# Patient Record
Sex: MALE | Race: WHITE | Employment: FULL TIME | URBAN - NONMETROPOLITAN AREA
[De-identification: names, ages, dates, MRNs, and addresses within clinical notes are randomized per-mention and may not be internally consistent; named-entity substitution may affect disease eponyms.]

---

## 2021-12-27 ENCOUNTER — APPOINTMENT (OUTPATIENT)
Dept: GENERAL RADIOLOGY | Age: 46
DRG: 177 | End: 2021-12-27

## 2021-12-27 ENCOUNTER — APPOINTMENT (OUTPATIENT)
Dept: CT IMAGING | Age: 46
DRG: 177 | End: 2021-12-27

## 2021-12-27 ENCOUNTER — HOSPITAL ENCOUNTER (INPATIENT)
Age: 46
LOS: 7 days | Discharge: HOME | DRG: 177 | End: 2022-01-03
Attending: PEDIATRICS | Admitting: HOSPITALIST

## 2021-12-27 DIAGNOSIS — U07.1 COVID-19 VIRUS INFECTION: ICD-10-CM

## 2021-12-27 DIAGNOSIS — E13.10 DIABETIC KETOACIDOSIS WITHOUT COMA ASSOCIATED WITH OTHER SPECIFIED DIABETES MELLITUS (HCC): Primary | ICD-10-CM

## 2021-12-27 DIAGNOSIS — E10.10 DKA, TYPE 1, NOT AT GOAL (HCC): ICD-10-CM

## 2021-12-27 LAB
ADENOVIRUS BY PCR: NOT DETECTED
ALBUMIN SERPL-MCNC: 3.1 G/DL (ref 3.5–5.2)
ALBUMIN SERPL-MCNC: 3.9 G/DL (ref 3.5–5.2)
ALP BLD-CCNC: 120 U/L (ref 40–130)
ALP BLD-CCNC: 131 U/L (ref 40–130)
ALT SERPL-CCNC: 33 U/L (ref 5–41)
ALT SERPL-CCNC: 36 U/L (ref 5–41)
AMORPHOUS: ABNORMAL /HPF
AMPHETAMINE SCREEN, URINE: NEGATIVE
AMYLASE: 82 U/L (ref 28–100)
ANION GAP SERPL CALCULATED.3IONS-SCNC: 19 MMOL/L (ref 7–19)
ANION GAP SERPL CALCULATED.3IONS-SCNC: 22 MMOL/L (ref 7–19)
ANION GAP SERPL CALCULATED.3IONS-SCNC: 25 MMOL/L (ref 7–19)
ANION GAP SERPL CALCULATED.3IONS-SCNC: 28 MMOL/L (ref 7–19)
AST SERPL-CCNC: 22 U/L (ref 5–40)
AST SERPL-CCNC: 32 U/L (ref 5–40)
BACTERIA: ABNORMAL /HPF
BARBITURATE SCREEN URINE: NEGATIVE
BASE EXCESS ARTERIAL: -19.1 MMOL/L (ref -2–2)
BASE EXCESS ARTERIAL: -22.5 MMOL/L (ref -2–2)
BASE EXCESS ARTERIAL: -23.4 MMOL/L (ref -2–2)
BASE EXCESS ARTERIAL: -26.7 MMOL/L (ref -2–2)
BASE EXCESS ARTERIAL: -27.1 MMOL/L (ref -2–2)
BASOPHILS ABSOLUTE: 0.1 K/UL (ref 0–0.2)
BASOPHILS RELATIVE PERCENT: 0.4 % (ref 0–1)
BENZODIAZEPINE SCREEN, URINE: NEGATIVE
BILIRUB SERPL-MCNC: <0.2 MG/DL (ref 0.2–1.2)
BILIRUB SERPL-MCNC: <0.2 MG/DL (ref 0.2–1.2)
BILIRUBIN DIRECT: 0.1 MG/DL (ref 0–0.3)
BILIRUBIN URINE: NEGATIVE
BILIRUBIN, INDIRECT: 0.1 MG/DL (ref 0.1–1)
BLOOD, URINE: ABNORMAL
BORDETELLA PARAPERTUSSIS BY PCR: NOT DETECTED
BORDETELLA PERTUSSIS BY PCR: NOT DETECTED
BUN BLDV-MCNC: 14 MG/DL (ref 6–20)
BUN BLDV-MCNC: 15 MG/DL (ref 6–20)
BUN BLDV-MCNC: 16 MG/DL (ref 6–20)
BUN BLDV-MCNC: 18 MG/DL (ref 6–20)
C-REACTIVE PROTEIN: 15.78 MG/DL (ref 0–0.5)
CALCIUM SERPL-MCNC: 7.3 MG/DL (ref 8.6–10)
CALCIUM SERPL-MCNC: 7.7 MG/DL (ref 8.6–10)
CALCIUM SERPL-MCNC: 8.1 MG/DL (ref 8.6–10)
CALCIUM SERPL-MCNC: 8.9 MG/DL (ref 8.6–10)
CANNABINOID SCREEN URINE: NEGATIVE
CARBOXYHEMOGLOBIN ARTERIAL: 1.5 % (ref 0–5)
CARBOXYHEMOGLOBIN ARTERIAL: 1.5 % (ref 0–5)
CARBOXYHEMOGLOBIN ARTERIAL: 1.7 % (ref 0–5)
CARBOXYHEMOGLOBIN ARTERIAL: 1.8 % (ref 0–5)
CARBOXYHEMOGLOBIN ARTERIAL: 1.9 % (ref 0–5)
CHLAMYDOPHILIA PNEUMONIAE BY PCR: NOT DETECTED
CHLORIDE BLD-SCNC: 104 MMOL/L (ref 98–111)
CHLORIDE BLD-SCNC: 107 MMOL/L (ref 98–111)
CHLORIDE BLD-SCNC: 108 MMOL/L (ref 98–111)
CHLORIDE BLD-SCNC: 93 MMOL/L (ref 98–111)
CHP ED QC CHECK: YES
CLARITY: CLEAR
CO2: 6 MMOL/L (ref 22–29)
CO2: 7 MMOL/L (ref 22–29)
CO2: 7 MMOL/L (ref 22–29)
CO2: 8 MMOL/L (ref 22–29)
COARSE CASTS, UA: ABNORMAL /LPF (ref 0–5)
COCAINE METABOLITE SCREEN URINE: NEGATIVE
COLOR: YELLOW
CORONAVIRUS 229E BY PCR: NOT DETECTED
CORONAVIRUS HKU1 BY PCR: NOT DETECTED
CORONAVIRUS NL63 BY PCR: NOT DETECTED
CORONAVIRUS OC43 BY PCR: NOT DETECTED
CREAT SERPL-MCNC: 1 MG/DL (ref 0.5–1.2)
CREAT SERPL-MCNC: 1.2 MG/DL (ref 0.5–1.2)
CREAT SERPL-MCNC: 1.2 MG/DL (ref 0.5–1.2)
CREAT SERPL-MCNC: 1.4 MG/DL (ref 0.5–1.2)
CRYSTALS, UA: ABNORMAL /HPF
EKG P AXIS: 69 DEGREES
EKG P-R INTERVAL: 162 MS
EKG Q-T INTERVAL: 310 MS
EKG QRS DURATION: 84 MS
EKG QTC CALCULATION (BAZETT): 436 MS
EKG T AXIS: 52 DEGREES
EOSINOPHILS ABSOLUTE: 0 K/UL (ref 0–0.6)
EOSINOPHILS RELATIVE PERCENT: 0.2 % (ref 0–5)
EPITHELIAL CELLS, UA: ABNORMAL /HPF
ETHANOL: <10 MG/DL (ref 0–0.08)
GFR AFRICAN AMERICAN: >59
GFR NON-AFRICAN AMERICAN: 55
GFR NON-AFRICAN AMERICAN: >60
GLUCOSE BLD-MCNC: 234 MG/DL (ref 70–99)
GLUCOSE BLD-MCNC: 258 MG/DL (ref 70–99)
GLUCOSE BLD-MCNC: 261 MG/DL (ref 70–99)
GLUCOSE BLD-MCNC: 267 MG/DL (ref 70–99)
GLUCOSE BLD-MCNC: 269 MG/DL (ref 70–99)
GLUCOSE BLD-MCNC: 270 MG/DL (ref 70–99)
GLUCOSE BLD-MCNC: 284 MG/DL
GLUCOSE BLD-MCNC: 284 MG/DL (ref 70–99)
GLUCOSE BLD-MCNC: 289 MG/DL
GLUCOSE BLD-MCNC: 289 MG/DL (ref 70–99)
GLUCOSE BLD-MCNC: 291 MG/DL
GLUCOSE BLD-MCNC: 291 MG/DL (ref 70–99)
GLUCOSE BLD-MCNC: 297 MG/DL
GLUCOSE BLD-MCNC: 297 MG/DL (ref 70–99)
GLUCOSE BLD-MCNC: 307 MG/DL (ref 74–109)
GLUCOSE BLD-MCNC: 308 MG/DL (ref 70–99)
GLUCOSE BLD-MCNC: 313 MG/DL (ref 70–99)
GLUCOSE BLD-MCNC: 325 MG/DL
GLUCOSE BLD-MCNC: 325 MG/DL (ref 70–99)
GLUCOSE BLD-MCNC: 335 MG/DL (ref 70–99)
GLUCOSE BLD-MCNC: 342 MG/DL (ref 74–109)
GLUCOSE BLD-MCNC: 349 MG/DL
GLUCOSE BLD-MCNC: 349 MG/DL (ref 70–99)
GLUCOSE BLD-MCNC: 350 MG/DL (ref 74–109)
GLUCOSE BLD-MCNC: 372 MG/DL (ref 74–109)
GLUCOSE URINE: =>1000 MG/DL
HBA1C MFR BLD: 12.3 % (ref 4–6)
HCO3 ARTERIAL: 3 MMOL/L (ref 22–26)
HCO3 ARTERIAL: 3.5 MMOL/L (ref 22–26)
HCO3 ARTERIAL: 3.9 MMOL/L (ref 22–26)
HCO3 ARTERIAL: 5.6 MMOL/L (ref 22–26)
HCO3 ARTERIAL: 7.5 MMOL/L (ref 22–26)
HCT VFR BLD CALC: 54.8 % (ref 42–52)
HEMOGLOBIN, ART, EXTENDED: 16.2 G/DL (ref 14–18)
HEMOGLOBIN, ART, EXTENDED: 16.4 G/DL (ref 14–18)
HEMOGLOBIN, ART, EXTENDED: 16.8 G/DL (ref 14–18)
HEMOGLOBIN, ART, EXTENDED: 17.4 G/DL (ref 14–18)
HEMOGLOBIN, ART, EXTENDED: 18 G/DL (ref 14–18)
HEMOGLOBIN: 18.2 G/DL (ref 14–18)
HUMAN METAPNEUMOVIRUS BY PCR: NOT DETECTED
HUMAN RHINOVIRUS/ENTEROVIRUS BY PCR: NOT DETECTED
IMMATURE GRANULOCYTES #: 0.2 K/UL
INFLUENZA A BY PCR: NOT DETECTED
INFLUENZA B BY PCR: NOT DETECTED
KETONES, URINE: =>160 MG/DL
LACTIC ACID: 1.3 MMOL/L (ref 0.5–1.9)
LEUKOCYTE ESTERASE, URINE: NEGATIVE
LIPASE: 12 U/L (ref 13–60)
LIPASE: 25 U/L (ref 13–60)
LYMPHOCYTES ABSOLUTE: 1.2 K/UL (ref 1.1–4.5)
LYMPHOCYTES RELATIVE PERCENT: 7.6 % (ref 20–40)
Lab: NORMAL
MAGNESIUM: 1.7 MG/DL (ref 1.6–2.6)
MAGNESIUM: 1.9 MG/DL (ref 1.6–2.6)
MAGNESIUM: 2.1 MG/DL (ref 1.6–2.6)
MCH RBC QN AUTO: 29.8 PG (ref 27–31)
MCHC RBC AUTO-ENTMCNC: 33.2 G/DL (ref 33–37)
MCV RBC AUTO: 89.8 FL (ref 80–94)
METHEMOGLOBIN ARTERIAL: 1 %
METHEMOGLOBIN ARTERIAL: 1 %
METHEMOGLOBIN ARTERIAL: 1.2 %
METHEMOGLOBIN ARTERIAL: 1.5 %
METHEMOGLOBIN ARTERIAL: 1.5 %
MONOCYTES ABSOLUTE: 0.9 K/UL (ref 0–0.9)
MONOCYTES RELATIVE PERCENT: 5.6 % (ref 0–10)
MUCUS: ABNORMAL /LPF
MYCOPLASMA PNEUMONIAE BY PCR: NOT DETECTED
NEUTROPHILS ABSOLUTE: 13.7 K/UL (ref 1.5–7.5)
NEUTROPHILS RELATIVE PERCENT: 85.3 % (ref 50–65)
NITRITE, URINE: NEGATIVE
O2 CONTENT ARTERIAL: 21.3 ML/DL
O2 CONTENT ARTERIAL: 21.5 ML/DL
O2 CONTENT ARTERIAL: 22.2 ML/DL
O2 CONTENT ARTERIAL: 22.9 ML/DL
O2 CONTENT ARTERIAL: 23.8 ML/DL
O2 SAT, ARTERIAL: 92.5 %
O2 SAT, ARTERIAL: 93.7 %
O2 SAT, ARTERIAL: 94.1 %
O2 SAT, ARTERIAL: 94.1 %
O2 SAT, ARTERIAL: 94.3 %
O2 THERAPY: ABNORMAL
OPIATE SCREEN URINE: NEGATIVE
PARAINFLUENZA VIRUS 1 BY PCR: NOT DETECTED
PARAINFLUENZA VIRUS 2 BY PCR: NOT DETECTED
PARAINFLUENZA VIRUS 3 BY PCR: NOT DETECTED
PARAINFLUENZA VIRUS 4 BY PCR: NOT DETECTED
PCO2 ARTERIAL: 13 MMHG (ref 35–45)
PCO2 ARTERIAL: 13 MMHG (ref 35–45)
PCO2 ARTERIAL: 15 MMHG (ref 35–45)
PCO2 ARTERIAL: 19 MMHG (ref 35–45)
PCO2 ARTERIAL: 21 MMHG (ref 35–45)
PDW BLD-RTO: 12.8 % (ref 11.5–14.5)
PERFORMED ON: ABNORMAL
PH ARTERIAL: 6.97 (ref 7.35–7.45)
PH ARTERIAL: 6.97 (ref 7.35–7.45)
PH ARTERIAL: 7.08 (ref 7.35–7.45)
PH ARTERIAL: 7.09 (ref 7.35–7.45)
PH ARTERIAL: 7.16 (ref 7.35–7.45)
PH UA: 5 (ref 5–8)
PHOSPHORUS: 0.8 MG/DL (ref 2.5–4.5)
PHOSPHORUS: 1.9 MG/DL (ref 2.5–4.5)
PLATELET # BLD: 188 K/UL (ref 130–400)
PMV BLD AUTO: 10.7 FL (ref 9.4–12.4)
PO2 ARTERIAL: 56 MMHG (ref 80–100)
PO2 ARTERIAL: 64 MMHG (ref 80–100)
PO2 ARTERIAL: 66 MMHG (ref 80–100)
PO2 ARTERIAL: 78 MMHG (ref 80–100)
PO2 ARTERIAL: 80 MMHG (ref 80–100)
POTASSIUM SERPL-SCNC: 3.7 MMOL/L (ref 3.5–5)
POTASSIUM SERPL-SCNC: 3.7 MMOL/L (ref 3.5–5)
POTASSIUM SERPL-SCNC: 4 MMOL/L (ref 3.5–5)
POTASSIUM SERPL-SCNC: 4.2 MMOL/L (ref 3.5–5)
POTASSIUM, WHOLE BLOOD: 2.5
POTASSIUM, WHOLE BLOOD: 3.3
POTASSIUM, WHOLE BLOOD: 3.7
POTASSIUM, WHOLE BLOOD: 3.8
POTASSIUM, WHOLE BLOOD: 4
PROTEIN UA: 100 MG/DL
RBC # BLD: 6.1 M/UL (ref 4.7–6.1)
RESPIRATORY SYNCYTIAL VIRUS BY PCR: NOT DETECTED
SARS-COV-2, PCR: DETECTED
SEDIMENTATION RATE, ERYTHROCYTE: 47 MM/HR (ref 0–10)
SODIUM BLD-SCNC: 128 MMOL/L (ref 136–145)
SODIUM BLD-SCNC: 135 MMOL/L (ref 136–145)
SODIUM BLD-SCNC: 135 MMOL/L (ref 136–145)
SODIUM BLD-SCNC: 136 MMOL/L (ref 136–145)
SPECIFIC GRAVITY UA: 1.03 (ref 1–1.03)
TOTAL CK: 74 U/L (ref 39–308)
TOTAL PROTEIN: 6.6 G/DL (ref 6.6–8.7)
TOTAL PROTEIN: 7.9 G/DL (ref 6.6–8.7)
TROPONIN: <0.01 NG/ML (ref 0–0.03)
TSH SERPL DL<=0.05 MIU/L-ACNC: 0.58 UIU/ML (ref 0.27–4.2)
UROBILINOGEN, URINE: 0.2 E.U./DL
WBC # BLD: 16.1 K/UL (ref 4.8–10.8)

## 2021-12-27 PROCEDURE — 82077 ASSAY SPEC XCP UR&BREATH IA: CPT

## 2021-12-27 PROCEDURE — 83690 ASSAY OF LIPASE: CPT

## 2021-12-27 PROCEDURE — 2500000003 HC RX 250 WO HCPCS: Performed by: HOSPITALIST

## 2021-12-27 PROCEDURE — 6360000004 HC RX CONTRAST MEDICATION: Performed by: PEDIATRICS

## 2021-12-27 PROCEDURE — 2500000003 HC RX 250 WO HCPCS: Performed by: INTERNAL MEDICINE

## 2021-12-27 PROCEDURE — 81001 URINALYSIS AUTO W/SCOPE: CPT

## 2021-12-27 PROCEDURE — 83605 ASSAY OF LACTIC ACID: CPT

## 2021-12-27 PROCEDURE — 82803 BLOOD GASES ANY COMBINATION: CPT

## 2021-12-27 PROCEDURE — 6360000002 HC RX W HCPCS: Performed by: HOSPITALIST

## 2021-12-27 PROCEDURE — 96365 THER/PROPH/DIAG IV INF INIT: CPT

## 2021-12-27 PROCEDURE — 85652 RBC SED RATE AUTOMATED: CPT

## 2021-12-27 PROCEDURE — 82947 ASSAY GLUCOSE BLOOD QUANT: CPT

## 2021-12-27 PROCEDURE — 87086 URINE CULTURE/COLONY COUNT: CPT

## 2021-12-27 PROCEDURE — 6360000002 HC RX W HCPCS

## 2021-12-27 PROCEDURE — 87040 BLOOD CULTURE FOR BACTERIA: CPT

## 2021-12-27 PROCEDURE — 51702 INSERT TEMP BLADDER CATH: CPT

## 2021-12-27 PROCEDURE — 2580000003 HC RX 258: Performed by: INTERNAL MEDICINE

## 2021-12-27 PROCEDURE — 0202U NFCT DS 22 TRGT SARS-COV-2: CPT

## 2021-12-27 PROCEDURE — 6370000000 HC RX 637 (ALT 250 FOR IP): Performed by: HOSPITALIST

## 2021-12-27 PROCEDURE — 93010 ELECTROCARDIOGRAM REPORT: CPT | Performed by: INTERNAL MEDICINE

## 2021-12-27 PROCEDURE — 85025 COMPLETE CBC W/AUTO DIFF WBC: CPT

## 2021-12-27 PROCEDURE — 2700000000 HC OXYGEN THERAPY PER DAY

## 2021-12-27 PROCEDURE — 71275 CT ANGIOGRAPHY CHEST: CPT

## 2021-12-27 PROCEDURE — 83735 ASSAY OF MAGNESIUM: CPT

## 2021-12-27 PROCEDURE — 84443 ASSAY THYROID STIM HORMONE: CPT

## 2021-12-27 PROCEDURE — 2580000003 HC RX 258: Performed by: HOSPITALIST

## 2021-12-27 PROCEDURE — 2500000003 HC RX 250 WO HCPCS

## 2021-12-27 PROCEDURE — 96375 TX/PRO/DX INJ NEW DRUG ADDON: CPT

## 2021-12-27 PROCEDURE — 83036 HEMOGLOBIN GLYCOSYLATED A1C: CPT

## 2021-12-27 PROCEDURE — 36415 COLL VENOUS BLD VENIPUNCTURE: CPT

## 2021-12-27 PROCEDURE — 82150 ASSAY OF AMYLASE: CPT

## 2021-12-27 PROCEDURE — 82010 KETONE BODYS QUAN: CPT

## 2021-12-27 PROCEDURE — 36600 WITHDRAWAL OF ARTERIAL BLOOD: CPT

## 2021-12-27 PROCEDURE — 80053 COMPREHEN METABOLIC PANEL: CPT

## 2021-12-27 PROCEDURE — 3E0333Z INTRODUCTION OF ANTI-INFLAMMATORY INTO PERIPHERAL VEIN, PERCUTANEOUS APPROACH: ICD-10-PCS | Performed by: HOSPITALIST

## 2021-12-27 PROCEDURE — 2580000003 HC RX 258: Performed by: PEDIATRICS

## 2021-12-27 PROCEDURE — 72100 X-RAY EXAM L-S SPINE 2/3 VWS: CPT

## 2021-12-27 PROCEDURE — 96361 HYDRATE IV INFUSION ADD-ON: CPT

## 2021-12-27 PROCEDURE — 84100 ASSAY OF PHOSPHORUS: CPT

## 2021-12-27 PROCEDURE — 84132 ASSAY OF SERUM POTASSIUM: CPT

## 2021-12-27 PROCEDURE — 80307 DRUG TEST PRSMV CHEM ANLYZR: CPT

## 2021-12-27 PROCEDURE — 84484 ASSAY OF TROPONIN QUANT: CPT

## 2021-12-27 PROCEDURE — 6370000000 HC RX 637 (ALT 250 FOR IP): Performed by: PEDIATRICS

## 2021-12-27 PROCEDURE — 2100000000 HC CCU R&B

## 2021-12-27 PROCEDURE — 82248 BILIRUBIN DIRECT: CPT

## 2021-12-27 PROCEDURE — 6360000002 HC RX W HCPCS: Performed by: INTERNAL MEDICINE

## 2021-12-27 PROCEDURE — 86140 C-REACTIVE PROTEIN: CPT

## 2021-12-27 PROCEDURE — 93005 ELECTROCARDIOGRAM TRACING: CPT | Performed by: PEDIATRICS

## 2021-12-27 PROCEDURE — 99284 EMERGENCY DEPT VISIT MOD MDM: CPT

## 2021-12-27 PROCEDURE — 82550 ASSAY OF CK (CPK): CPT

## 2021-12-27 RX ORDER — MAGNESIUM SULFATE 1 G/100ML
1000 INJECTION INTRAVENOUS PRN
Status: DISCONTINUED | OUTPATIENT
Start: 2021-12-27 | End: 2021-12-29 | Stop reason: SDUPTHER

## 2021-12-27 RX ORDER — LORAZEPAM 2 MG/ML
1 INJECTION INTRAMUSCULAR EVERY 4 HOURS PRN
Status: DISCONTINUED | OUTPATIENT
Start: 2021-12-27 | End: 2021-12-27

## 2021-12-27 RX ORDER — 0.9 % SODIUM CHLORIDE 0.9 %
1000 INTRAVENOUS SOLUTION INTRAVENOUS ONCE
Status: COMPLETED | OUTPATIENT
Start: 2021-12-27 | End: 2021-12-27

## 2021-12-27 RX ORDER — HYDROMORPHONE HYDROCHLORIDE 1 MG/ML
0.5 INJECTION, SOLUTION INTRAMUSCULAR; INTRAVENOUS; SUBCUTANEOUS EVERY 30 MIN PRN
Status: DISCONTINUED | OUTPATIENT
Start: 2021-12-27 | End: 2021-12-27

## 2021-12-27 RX ORDER — SODIUM CHLORIDE 0.9 % (FLUSH) 0.9 %
5-40 SYRINGE (ML) INJECTION PRN
Status: DISCONTINUED | OUTPATIENT
Start: 2021-12-27 | End: 2021-12-28 | Stop reason: SDUPTHER

## 2021-12-27 RX ORDER — POLYETHYLENE GLYCOL 3350 17 G/17G
17 POWDER, FOR SOLUTION ORAL DAILY PRN
Status: DISCONTINUED | OUTPATIENT
Start: 2021-12-27 | End: 2021-12-27

## 2021-12-27 RX ORDER — POTASSIUM CHLORIDE 7.45 MG/ML
10 INJECTION INTRAVENOUS PRN
Status: DISCONTINUED | OUTPATIENT
Start: 2021-12-27 | End: 2021-12-29 | Stop reason: SDUPTHER

## 2021-12-27 RX ORDER — ACETAMINOPHEN 650 MG/1
650 SUPPOSITORY RECTAL EVERY 6 HOURS PRN
Status: DISCONTINUED | OUTPATIENT
Start: 2021-12-27 | End: 2021-12-28 | Stop reason: SDUPTHER

## 2021-12-27 RX ORDER — SODIUM CHLORIDE 9 MG/ML
INJECTION, SOLUTION INTRAVENOUS CONTINUOUS
Status: DISCONTINUED | OUTPATIENT
Start: 2021-12-27 | End: 2021-12-29

## 2021-12-27 RX ORDER — SODIUM CHLORIDE 0.9 % (FLUSH) 0.9 %
5-40 SYRINGE (ML) INJECTION EVERY 12 HOURS SCHEDULED
Status: DISCONTINUED | OUTPATIENT
Start: 2021-12-28 | End: 2021-12-28 | Stop reason: SDUPTHER

## 2021-12-27 RX ORDER — SODIUM CHLORIDE 0.9 % (FLUSH) 0.9 %
5-40 SYRINGE (ML) INJECTION EVERY 12 HOURS SCHEDULED
Status: DISCONTINUED | OUTPATIENT
Start: 2021-12-27 | End: 2022-01-01 | Stop reason: SDUPTHER

## 2021-12-27 RX ORDER — ACETAMINOPHEN 650 MG/1
650 SUPPOSITORY RECTAL EVERY 6 HOURS PRN
Status: DISCONTINUED | OUTPATIENT
Start: 2021-12-27 | End: 2022-01-03 | Stop reason: HOSPADM

## 2021-12-27 RX ORDER — SODIUM CHLORIDE 9 MG/ML
25 INJECTION, SOLUTION INTRAVENOUS PRN
Status: DISCONTINUED | OUTPATIENT
Start: 2021-12-27 | End: 2021-12-28 | Stop reason: SDUPTHER

## 2021-12-27 RX ORDER — DEXTROSE, SODIUM CHLORIDE, AND POTASSIUM CHLORIDE 5; .45; .15 G/100ML; G/100ML; G/100ML
INJECTION INTRAVENOUS CONTINUOUS PRN
Status: DISCONTINUED | OUTPATIENT
Start: 2021-12-27 | End: 2021-12-29 | Stop reason: SDUPTHER

## 2021-12-27 RX ORDER — DEXAMETHASONE SODIUM PHOSPHATE 10 MG/ML
4 INJECTION, SOLUTION INTRAMUSCULAR; INTRAVENOUS EVERY 6 HOURS
Status: DISCONTINUED | OUTPATIENT
Start: 2021-12-27 | End: 2021-12-28

## 2021-12-27 RX ORDER — ACETAMINOPHEN 325 MG/1
650 TABLET ORAL EVERY 6 HOURS PRN
Status: DISCONTINUED | OUTPATIENT
Start: 2021-12-27 | End: 2021-12-30 | Stop reason: SDUPTHER

## 2021-12-27 RX ORDER — LORAZEPAM 2 MG/ML
0.5 INJECTION INTRAMUSCULAR EVERY 4 HOURS PRN
Status: DISCONTINUED | OUTPATIENT
Start: 2021-12-27 | End: 2021-12-29

## 2021-12-27 RX ORDER — ACETAMINOPHEN 325 MG/1
650 TABLET ORAL EVERY 6 HOURS PRN
Status: DISCONTINUED | OUTPATIENT
Start: 2021-12-27 | End: 2021-12-28 | Stop reason: SDUPTHER

## 2021-12-27 RX ORDER — ONDANSETRON 2 MG/ML
4 INJECTION INTRAMUSCULAR; INTRAVENOUS EVERY 6 HOURS PRN
Status: DISCONTINUED | OUTPATIENT
Start: 2021-12-27 | End: 2022-01-03 | Stop reason: HOSPADM

## 2021-12-27 RX ORDER — METOPROLOL TARTRATE 5 MG/5ML
2.5 INJECTION INTRAVENOUS EVERY 5 MIN PRN
Status: COMPLETED | OUTPATIENT
Start: 2021-12-27 | End: 2021-12-28

## 2021-12-27 RX ORDER — DEXTROSE MONOHYDRATE 25 G/50ML
12.5 INJECTION, SOLUTION INTRAVENOUS PRN
Status: DISCONTINUED | OUTPATIENT
Start: 2021-12-27 | End: 2021-12-28 | Stop reason: SDUPTHER

## 2021-12-27 RX ORDER — ONDANSETRON 2 MG/ML
INJECTION INTRAMUSCULAR; INTRAVENOUS
Status: COMPLETED
Start: 2021-12-27 | End: 2021-12-27

## 2021-12-27 RX ORDER — POTASSIUM CHLORIDE 7.45 MG/ML
10 INJECTION INTRAVENOUS
Status: ACTIVE | OUTPATIENT
Start: 2021-12-27 | End: 2021-12-28

## 2021-12-27 RX ORDER — 0.9 % SODIUM CHLORIDE 0.9 %
15 INTRAVENOUS SOLUTION INTRAVENOUS ONCE
Status: COMPLETED | OUTPATIENT
Start: 2021-12-27 | End: 2021-12-27

## 2021-12-27 RX ORDER — ONDANSETRON 4 MG/1
4 TABLET, ORALLY DISINTEGRATING ORAL EVERY 8 HOURS PRN
Status: DISCONTINUED | OUTPATIENT
Start: 2021-12-27 | End: 2022-01-03 | Stop reason: HOSPADM

## 2021-12-27 RX ORDER — POLYETHYLENE GLYCOL 3350 17 G/17G
17 POWDER, FOR SOLUTION ORAL DAILY PRN
Status: DISCONTINUED | OUTPATIENT
Start: 2021-12-27 | End: 2022-01-03 | Stop reason: HOSPADM

## 2021-12-27 RX ORDER — ONDANSETRON 2 MG/ML
4 INJECTION INTRAMUSCULAR; INTRAVENOUS ONCE
Status: COMPLETED | OUTPATIENT
Start: 2021-12-27 | End: 2021-12-27

## 2021-12-27 RX ORDER — SODIUM CHLORIDE 0.9 % (FLUSH) 0.9 %
5-40 SYRINGE (ML) INJECTION PRN
Status: DISCONTINUED | OUTPATIENT
Start: 2021-12-27 | End: 2022-01-01 | Stop reason: SDUPTHER

## 2021-12-27 RX ORDER — SODIUM CHLORIDE 9 MG/ML
25 INJECTION, SOLUTION INTRAVENOUS PRN
Status: DISCONTINUED | OUTPATIENT
Start: 2021-12-27 | End: 2021-12-29 | Stop reason: SDUPTHER

## 2021-12-27 RX ORDER — ONDANSETRON 2 MG/ML
4 INJECTION INTRAMUSCULAR; INTRAVENOUS EVERY 6 HOURS PRN
Status: DISCONTINUED | OUTPATIENT
Start: 2021-12-27 | End: 2021-12-27

## 2021-12-27 RX ORDER — LORAZEPAM 2 MG/ML
INJECTION INTRAMUSCULAR
Status: COMPLETED
Start: 2021-12-27 | End: 2021-12-27

## 2021-12-27 RX ORDER — LORAZEPAM 2 MG/ML
2 INJECTION INTRAMUSCULAR ONCE
Status: COMPLETED | OUTPATIENT
Start: 2021-12-27 | End: 2021-12-27

## 2021-12-27 RX ORDER — ONDANSETRON 4 MG/1
4 TABLET, ORALLY DISINTEGRATING ORAL EVERY 8 HOURS PRN
Status: DISCONTINUED | OUTPATIENT
Start: 2021-12-27 | End: 2021-12-27

## 2021-12-27 RX ADMIN — SODIUM CHLORIDE 0.5 UNITS/HR: 9 INJECTION, SOLUTION INTRAVENOUS at 04:08

## 2021-12-27 RX ADMIN — SODIUM BICARBONATE 50 MEQ: 84 INJECTION, SOLUTION INTRAVENOUS at 06:36

## 2021-12-27 RX ADMIN — DEXAMETHASONE SODIUM PHOSPHATE 4 MG: 10 INJECTION INTRAMUSCULAR; INTRAVENOUS at 12:08

## 2021-12-27 RX ADMIN — SODIUM CHLORIDE: 9 INJECTION, SOLUTION INTRAVENOUS at 16:35

## 2021-12-27 RX ADMIN — SODIUM CHLORIDE 0.75 UNITS/HR: 9 INJECTION, SOLUTION INTRAVENOUS at 08:36

## 2021-12-27 RX ADMIN — ENOXAPARIN SODIUM 30 MG: 100 INJECTION SUBCUTANEOUS at 12:08

## 2021-12-27 RX ADMIN — IOPAMIDOL 90 ML: 755 INJECTION, SOLUTION INTRAVENOUS at 03:46

## 2021-12-27 RX ADMIN — SODIUM CHLORIDE: 9 INJECTION, SOLUTION INTRAVENOUS at 20:21

## 2021-12-27 RX ADMIN — DEXTROSE, SODIUM CHLORIDE, AND POTASSIUM CHLORIDE: 5; .45; .15 INJECTION INTRAVENOUS at 18:32

## 2021-12-27 RX ADMIN — DEXAMETHASONE SODIUM PHOSPHATE 4 MG: 10 INJECTION INTRAMUSCULAR; INTRAVENOUS at 18:36

## 2021-12-27 RX ADMIN — POTASSIUM CHLORIDE 10 MEQ: 10 INJECTION, SOLUTION INTRAVENOUS at 21:32

## 2021-12-27 RX ADMIN — LORAZEPAM 2 MG: 2 INJECTION INTRAMUSCULAR at 08:45

## 2021-12-27 RX ADMIN — SODIUM CHLORIDE 7.7 UNITS/HR: 9 INJECTION, SOLUTION INTRAVENOUS at 18:05

## 2021-12-27 RX ADMIN — SODIUM PHOSPHATE, MONOBASIC, MONOHYDRATE 20 MMOL: 276; 142 INJECTION, SOLUTION INTRAVENOUS at 20:20

## 2021-12-27 RX ADMIN — ONDANSETRON 4 MG: 2 INJECTION INTRAMUSCULAR; INTRAVENOUS at 01:56

## 2021-12-27 RX ADMIN — METOPROLOL TARTRATE 2.5 MG: 5 INJECTION INTRAVENOUS at 21:36

## 2021-12-27 RX ADMIN — AZITHROMYCIN MONOHYDRATE 250 MG: 500 INJECTION, POWDER, LYOPHILIZED, FOR SOLUTION INTRAVENOUS at 16:18

## 2021-12-27 RX ADMIN — SODIUM CHLORIDE 1157 ML: 9 INJECTION, SOLUTION INTRAVENOUS at 06:39

## 2021-12-27 RX ADMIN — SODIUM CHLORIDE 1000 ML: 9 INJECTION, SOLUTION INTRAVENOUS at 03:26

## 2021-12-27 RX ADMIN — LORAZEPAM 2 MG: 2 INJECTION, SOLUTION INTRAMUSCULAR; INTRAVENOUS at 08:45

## 2021-12-27 RX ADMIN — CASIRIVIMAB AND IMDEVIMAB: 600; 600 INJECTION, SOLUTION, CONCENTRATE INTRAVENOUS at 09:20

## 2021-12-27 RX ADMIN — POTASSIUM CHLORIDE 10 MEQ: 10 INJECTION, SOLUTION INTRAVENOUS at 20:26

## 2021-12-27 RX ADMIN — Medication: at 19:27

## 2021-12-27 RX ADMIN — Medication 50 MEQ: at 09:01

## 2021-12-27 RX ADMIN — DEXAMETHASONE SODIUM PHOSPHATE 4 MG: 10 INJECTION INTRAMUSCULAR; INTRAVENOUS at 06:36

## 2021-12-27 RX ADMIN — POTASSIUM CHLORIDE 10 MEQ: 10 INJECTION, SOLUTION INTRAVENOUS at 22:37

## 2021-12-27 RX ADMIN — SODIUM CHLORIDE 1000 ML: 9 INJECTION, SOLUTION INTRAVENOUS at 01:58

## 2021-12-27 RX ADMIN — Medication 10 ML: at 21:37

## 2021-12-27 RX ADMIN — SODIUM BICARBONATE 50 MEQ: 84 INJECTION, SOLUTION INTRAVENOUS at 19:07

## 2021-12-27 RX ADMIN — SODIUM CHLORIDE: 9 INJECTION, SOLUTION INTRAVENOUS at 08:58

## 2021-12-27 RX ADMIN — SODIUM CHLORIDE 1000 ML: 9 INJECTION, SOLUTION INTRAVENOUS at 18:55

## 2021-12-27 RX ADMIN — SODIUM CHLORIDE: 9 INJECTION, SOLUTION INTRAVENOUS at 18:06

## 2021-12-27 RX ADMIN — ENOXAPARIN SODIUM 30 MG: 100 INJECTION SUBCUTANEOUS at 21:34

## 2021-12-27 ASSESSMENT — PAIN DESCRIPTION - PAIN TYPE: TYPE: ACUTE PAIN

## 2021-12-27 ASSESSMENT — PAIN SCALES - GENERAL
PAINLEVEL_OUTOF10: 10
PAINLEVEL_OUTOF10: 0

## 2021-12-27 ASSESSMENT — PAIN DESCRIPTION - LOCATION: LOCATION: BACK

## 2021-12-27 ASSESSMENT — PAIN DESCRIPTION - ORIENTATION: ORIENTATION: LOWER;MID

## 2021-12-27 NOTE — PROGRESS NOTES
Results for Everton Calloway (MRN [id number]) as of 12/27/2021 07:05   Ref.  Range 12/27/2021 07:04   Hemoglobin, Art, Extended Latest Ref Range: 14.0 - 18.0 g/dL 17.4   pH, Arterial Latest Ref Range: 7.350 - 7.450  7.090 (LL)   pCO2, Arterial Latest Ref Range: 35.0 - 45.0 mmHg 13.0 (LL)   pO2, Arterial Latest Ref Range: 80.0 - 100.0 mmHg 66.0 (L)   HCO3, Arterial Latest Ref Range: 22.0 - 26.0 mmol/L 3.9 (L)   Base Excess, Arterial Latest Ref Range: -2.0 - 2.0 mmol/L -23.4 (L)   O2 Sat, Arterial Latest Ref Range: >92 % 93.7   O2 Content, Arterial Latest Ref Range: Not Established mL/dL 22.9   Methemoglobin, Arterial Latest Ref Range: <1.5 % 1.0   Carboxyhgb, Arterial Latest Ref Range: 0.0 - 5.0 % 1.5   Pt on room air, rr, AT+

## 2021-12-27 NOTE — ED NOTES
MD Lion Carbone notified of C02 of 7, per him, will treat once the patient arrives up stairs in the CCU     Susan Kim RN  12/27/21 1545

## 2021-12-27 NOTE — ED NOTES
Pt agitated and reports 'having a panic attack.' vo ativan 2 mg iv per johnathan.      Silvia Dawson RN  12/27/21 7755

## 2021-12-27 NOTE — Clinical Note
Patient Class: Inpatient [101]   REQUIRED: Diagnosis: DKA, type 1, not at goal [facility]) [381941]   Estimated Length of Stay: Estimated stay of more than 2 midnights   Admitting Provider: Preeti Bedoya [[id number]]

## 2021-12-27 NOTE — ED NOTES
RN in room to check on patient. IV insulin is running, however does not appear to be attached to patient. Patient's recent BGL had increased, RN does not adjust rate of insulin drip, due to the fact that the insulin has not been reaching the patient. RN will titrate the drip based on the next BGL check in an hour.       Bro Barbosa RN  12/27/21 5119

## 2021-12-27 NOTE — H&P
History and Physical    Patient Name:  Esperanza Rogers    :  1975    Chief Complaint:   Fever, back pain     History of Present Illness:   Esperanza Rogers presents to [facility] presents with one week of increasing lower back pain, pain was 10 out 10, he had to take his wifes oxycodone which made it better, in addition he developed non bilious vomiting and generalized weakness with fever of 101. No cough, no wheezing, no abdominal pain, no diarrhea, no dysuria. No chest pain, dyspnea or palpitations. Past Medical History:  None     Surgical History:  None     Social History:   reports that he has never smoked. He has never used smokeless tobacco. He reports current alcohol use. He reports previous drug use. Family History: Mother and father had DM    Medications:none     Allergies:  Patient has no known allergies. Review of Systems:   · Constitutional: there has been no unanticipated weight loss. There's been change in energy level, activity level. Fever    · Eyes: No visual changes or diplopia. No scleral icterus. · ENT: No Headaches, hearing loss or vertigo. No mouth sores or sore throat. · Cardiovascular: No chest pain, palpitations or loss of consciousness. No pleuritic pain or phlebitis. No orthopnea, PND or peripheral edema. · Respiratory: No cough or wheezing, no sputum production. No hemoptysis. No dyspnea     · Gastrointestinal: No abdominal pain, appetite loss, blood in stools. No change in bowel habits. N/V. No blood per rectum. · Genitourinary: No dysuria, trouble voiding, or hematuria. · Musculoskeletal:  Back pain and generalized weakness  · Integumentary: No rash or pruritis. · Neurological: No headache, diplopia, change in muscle strength, numbness or tingling. No change in gait, balance, coordination. · Psychiatric: No anxiety, or depression. · Endocrine: No temperature intolerance. No excessive thirst, fluid intake, or urination. No tremor.   · Hematologic/Lymphatic: No abnormal bruising or bleeding, blood clots or swollen lymph nodes. · Allergic/Immunologic: No nasal congestion or hives. Physical Examination:    Vital Signs: BP (!) 144/85   Pulse 131   Temp 98.9 °F (37.2 °C)   Resp 24   Ht 5' 9\" (1.753 m)   Wt 170 lb (77.1 kg)   SpO2 (!) 87%   BMI 25.10 kg/m²   General appearance: Well preserved, mesomorphic body habitus, alert, moderate distress. Skin: Skin color, texture, turgor normal. No rashes or lesions. No induration or tightening palpated. Head: Normocephalic. No masses, lesions, tenderness or abnormalities  Eyes: conjunctivae/corneas clear. EOM's intact. Sclera non icteric. Ears: External ears normal.  Hearing normal to finger rub. Nose/Sinuses: Nares normal. Septum midline. Mucosa normal. No drainage or sinus tenderness. Oropharynx: Lips, mucosa, and tongue normal. Oropharynx clear with no exudate seen. Neck: Neck supple, and symmetric. No adenopathy. Trachea is midline. Carotids brisk in upstroke without bruits, No abnormal JVP noted at 45°. Lungs: Lungs clear to auscultation bilaterally. No retractions or use of accessory muscles. No vocal fremitus. No ronchi, crackle or rale. Heart:  S1 > S2. Regular rate and rhythm. No gallop, murmur, rub, palpable thrill or heave noted. Abdomen: Abdomen soft, non-tender. BS normal. No masses, organomegaly. No hernia noted. Extremities: Extremities normal. No deformities, edema, or skin discoloration. No cyanosis or clubbing noted to the nails. Peripheral pulses 4/4. Musculoskeletal: Spine ROM normal. Muscular strength intact. Neuro: Cranial nerves intact. Motor: Strength 5/5 in all extremities. No focal weakness. Sensory: grossly normal to touch.      Pertinent Labs:  CBC:   Recent Labs     12/27/21 0038   WBC 16.1*   RBC 6.10   HGB 18.2*   HCT 54.8*   MCV 89.8   MCH 29.8   MCHC 33.2   RDW 12.8      MPV 10.7     BMP:   Recent Labs     12/27/21  0038 12/27/21  0416 12/27/21  0544   *  -- --    K 4.2 3.8 4.0   CL 93*  --   --    CO2 7*  --   --    BUN 16  --   --    CREATININE 1.2  --   --    GLUCOSE 372*  --   --    CALCIUM 8.9  --   --      ABGs: No results for input(s): PO2, PCO2, PH, HCO3, BE, O2SAT in the last 72 hours. INR: No results for input(s): INR, PROTIME in the last 72 hours. BNP:  No results found for: BNP  TSH: No results found for: TSH  Cardiac Injury Profile: No results found for: CKTOTAL, CKMB, CKMBINDEX, TROPONINI  Lipid Profile: No components found for: CHLPL  No results found for: TRIG  No results found for: HDL  No results found for: LDLCALC  No results found for: LABVLDL  Hemoglobin A1C:   Lab Results   Component Value Date    LABA1C 12.3 (H) 12/27/2021     No results found for: EAG      Assessment/Plan:  · DKA- IVF, insulin gtt, fluids and electrolytes replacement per protocol  · New onset DM with Hga1c 12.3  · Fever, rule out bacterial infection -  CT chest, urine cultures, blood cultures   · Covid infection - decadron, lovenox, tx per protocol  · Back pain - xray- iv pain control - ESR, CRP               I have reviewed my findings and recommendations in detail with Jairon Bush.     Pilar Ferrer MD

## 2021-12-27 NOTE — PROGRESS NOTES
Contains critical data Blood Gas, Arterial  Order: [id number]   Status: Final result     Visible to patient: No (not released)     Next appt: None     0 Result Notes     Ref Range & Units 12/27/21 0416   pH, Arterial 7.350 - 7.450 6.970 Low Panic     pCO2, Arterial 35.0 - 45.0 mmHg 15.0 Low Panic     pO2, Arterial 80.0 - 100.0 mmHg 80.0    HCO3, Arterial 22.0 - 26.0 mmol/L 3.5 Low     Base Excess, Arterial -2.0 - 2.0 mmol/L -26.7 Low     Hemoglobin, Art, Extended 14.0 - 18.0 g/dL 16.2    O2 Sat, Arterial >92 % 94.3    Carboxyhgb, Arterial 0.0 - 5.0 % 1.7    Comment:      0.0-1.5   (Smokers 1.5-5.0)    Methemoglobin, Arterial <1.5 % 1.2    O2 Content, Arterial Not Established mL/dL 21.5         Pt on room air  resp rate 40  Right brachial puncture

## 2021-12-27 NOTE — PROGRESS NOTES
Follow-up note from this morning. I have reviewed his CT angiogram.  He is beginning to have infiltrates consistent with Covid in both bases of his lungs. His ABG shows an AA gradient. I have made the decision to treat him with Regeneron, because he certainly will be at high risk for progression of disease. Patient was agreeable. We are able to get the infusion for him this morning. We will continue with ongoing resuscitation for his DKA.

## 2021-12-28 LAB
ANION GAP SERPL CALCULATED.3IONS-SCNC: 19 MMOL/L (ref 7–19)
ANION GAP SERPL CALCULATED.3IONS-SCNC: 19 MMOL/L (ref 7–19)
ANION GAP SERPL CALCULATED.3IONS-SCNC: 9 MMOL/L (ref 7–19)
APTT: 194.1 SEC (ref 26–36.2)
BASOPHILS ABSOLUTE: 0.1 K/UL (ref 0–0.2)
BASOPHILS RELATIVE PERCENT: 0.3 % (ref 0–1)
BUN BLDV-MCNC: 14 MG/DL (ref 6–20)
BUN BLDV-MCNC: 15 MG/DL (ref 6–20)
BUN BLDV-MCNC: 15 MG/DL (ref 6–20)
CALCIUM SERPL-MCNC: 7.6 MG/DL (ref 8.6–10)
CALCIUM SERPL-MCNC: 7.8 MG/DL (ref 8.6–10)
CALCIUM SERPL-MCNC: 7.9 MG/DL (ref 8.6–10)
CALCIUM SERPL-MCNC: 7.9 MG/DL (ref 8.6–10)
CALCIUM SERPL-MCNC: 8.2 MG/DL (ref 8.6–10)
CHLORIDE BLD-SCNC: 110 MMOL/L (ref 98–111)
CHLORIDE BLD-SCNC: 113 MMOL/L (ref 98–111)
CHLORIDE BLD-SCNC: 115 MMOL/L (ref 98–111)
CHLORIDE BLD-SCNC: 117 MMOL/L (ref 98–111)
CHLORIDE BLD-SCNC: 119 MMOL/L (ref 98–111)
CO2: 11 MMOL/L (ref 22–29)
CO2: 17 MMOL/L (ref 22–29)
CO2: 17 MMOL/L (ref 22–29)
CO2: 18 MMOL/L (ref 22–29)
CO2: 6 MMOL/L (ref 22–29)
CREAT SERPL-MCNC: 1 MG/DL (ref 0.5–1.2)
CREAT SERPL-MCNC: 1 MG/DL (ref 0.5–1.2)
CREAT SERPL-MCNC: 1.1 MG/DL (ref 0.5–1.2)
CREAT SERPL-MCNC: 1.2 MG/DL (ref 0.5–1.2)
CREAT SERPL-MCNC: 1.3 MG/DL (ref 0.5–1.2)
EKG P AXIS: 57 DEGREES
EKG P-R INTERVAL: 152 MS
EKG Q-T INTERVAL: 322 MS
EKG QRS DURATION: 94 MS
EKG QTC CALCULATION (BAZETT): 437 MS
EKG T AXIS: 48 DEGREES
EOSINOPHILS ABSOLUTE: 0 K/UL (ref 0–0.6)
EOSINOPHILS RELATIVE PERCENT: 0.2 % (ref 0–5)
GFR AFRICAN AMERICAN: >59
GFR NON-AFRICAN AMERICAN: 60
GFR NON-AFRICAN AMERICAN: >60
GLUCOSE BLD-MCNC: 121 MG/DL (ref 70–99)
GLUCOSE BLD-MCNC: 166 MG/DL (ref 74–109)
GLUCOSE BLD-MCNC: 176 MG/DL (ref 70–99)
GLUCOSE BLD-MCNC: 188 MG/DL (ref 70–99)
GLUCOSE BLD-MCNC: 211 MG/DL (ref 70–99)
GLUCOSE BLD-MCNC: 229 MG/DL (ref 70–99)
GLUCOSE BLD-MCNC: 240 MG/DL (ref 70–99)
GLUCOSE BLD-MCNC: 243 MG/DL (ref 70–99)
GLUCOSE BLD-MCNC: 269 MG/DL (ref 74–109)
GLUCOSE BLD-MCNC: 274 MG/DL (ref 74–109)
GLUCOSE BLD-MCNC: 282 MG/DL (ref 70–99)
GLUCOSE BLD-MCNC: 285 MG/DL (ref 70–99)
GLUCOSE BLD-MCNC: 296 MG/DL (ref 70–99)
GLUCOSE BLD-MCNC: 302 MG/DL (ref 70–99)
GLUCOSE BLD-MCNC: 303 MG/DL (ref 74–109)
GLUCOSE BLD-MCNC: 319 MG/DL (ref 70–99)
GLUCOSE BLD-MCNC: 385 MG/DL (ref 74–109)
GLUCOSE BLD-MCNC: 389 MG/DL (ref 70–99)
GLUCOSE BLD-MCNC: 451 MG/DL (ref 70–99)
GLUCOSE BLD-MCNC: 451 MG/DL (ref 70–99)
GLUCOSE BLD-MCNC: 478 MG/DL (ref 70–99)
HBA1C MFR BLD: 13.1 % (ref 4–6)
HCT VFR BLD CALC: 48.2 % (ref 42–52)
HEMOGLOBIN: 16 G/DL (ref 14–18)
IMMATURE GRANULOCYTES #: 0.2 K/UL
INR BLD: 2.86 (ref 0.88–1.18)
LYMPHOCYTES ABSOLUTE: 0.9 K/UL (ref 1.1–4.5)
LYMPHOCYTES RELATIVE PERCENT: 5.6 % (ref 20–40)
MAGNESIUM: 1.9 MG/DL (ref 1.6–2.6)
MAGNESIUM: 2 MG/DL (ref 1.6–2.6)
MCH RBC QN AUTO: 30 PG (ref 27–31)
MCHC RBC AUTO-ENTMCNC: 33.2 G/DL (ref 33–37)
MCV RBC AUTO: 90.4 FL (ref 80–94)
MONOCYTES ABSOLUTE: 1.3 K/UL (ref 0–0.9)
MONOCYTES RELATIVE PERCENT: 7.5 % (ref 0–10)
NEUTROPHILS ABSOLUTE: 14.3 K/UL (ref 1.5–7.5)
NEUTROPHILS RELATIVE PERCENT: 85.3 % (ref 50–65)
PDW BLD-RTO: 13.2 % (ref 11.5–14.5)
PERFORMED ON: ABNORMAL
PHOSPHORUS: 0.4 MG/DL (ref 2.5–4.5)
PHOSPHORUS: 0.4 MG/DL (ref 2.5–4.5)
PHOSPHORUS: 0.6 MG/DL (ref 2.5–4.5)
PHOSPHORUS: 0.6 MG/DL (ref 2.5–4.5)
PHOSPHORUS: 0.7 MG/DL (ref 2.5–4.5)
PLATELET # BLD: 147 K/UL (ref 130–400)
PMV BLD AUTO: 10.6 FL (ref 9.4–12.4)
POTASSIUM SERPL-SCNC: 2.2 MMOL/L (ref 3.5–5)
POTASSIUM SERPL-SCNC: 2.7 MMOL/L (ref 3.5–5)
POTASSIUM SERPL-SCNC: 2.8 MMOL/L (ref 3.5–5)
POTASSIUM SERPL-SCNC: 2.9 MMOL/L (ref 3.5–5)
POTASSIUM SERPL-SCNC: 3 MMOL/L (ref 3.5–5)
PRO-BNP: 4357 PG/ML (ref 0–450)
PROTHROMBIN TIME: 29.5 SEC (ref 12–14.6)
RBC # BLD: 5.33 M/UL (ref 4.7–6.1)
SODIUM BLD-SCNC: 138 MMOL/L (ref 136–145)
SODIUM BLD-SCNC: 140 MMOL/L (ref 136–145)
SODIUM BLD-SCNC: 142 MMOL/L (ref 136–145)
SODIUM BLD-SCNC: 143 MMOL/L (ref 136–145)
SODIUM BLD-SCNC: 145 MMOL/L (ref 136–145)
TROPONIN: <0.01 NG/ML (ref 0–0.03)
WBC # BLD: 16.7 K/UL (ref 4.8–10.8)

## 2021-12-28 PROCEDURE — 2100000000 HC CCU R&B

## 2021-12-28 PROCEDURE — 84484 ASSAY OF TROPONIN QUANT: CPT

## 2021-12-28 PROCEDURE — 83880 ASSAY OF NATRIURETIC PEPTIDE: CPT

## 2021-12-28 PROCEDURE — XW033G6 INTRODUCTION OF REGN-COV2 MONOCLONAL ANTIBODY INTO PERIPHERAL VEIN, PERCUTANEOUS APPROACH, NEW TECHNOLOGY GROUP 6: ICD-10-PCS | Performed by: HOSPITALIST

## 2021-12-28 PROCEDURE — 6360000002 HC RX W HCPCS: Performed by: INTERNAL MEDICINE

## 2021-12-28 PROCEDURE — 85610 PROTHROMBIN TIME: CPT

## 2021-12-28 PROCEDURE — 83735 ASSAY OF MAGNESIUM: CPT

## 2021-12-28 PROCEDURE — 84100 ASSAY OF PHOSPHORUS: CPT

## 2021-12-28 PROCEDURE — 85025 COMPLETE CBC W/AUTO DIFF WBC: CPT

## 2021-12-28 PROCEDURE — 6370000000 HC RX 637 (ALT 250 FOR IP): Performed by: INTERNAL MEDICINE

## 2021-12-28 PROCEDURE — 6360000002 HC RX W HCPCS: Performed by: HOSPITALIST

## 2021-12-28 PROCEDURE — 2580000003 HC RX 258: Performed by: HOSPITALIST

## 2021-12-28 PROCEDURE — 93005 ELECTROCARDIOGRAM TRACING: CPT | Performed by: HOSPITALIST

## 2021-12-28 PROCEDURE — 2700000000 HC OXYGEN THERAPY PER DAY

## 2021-12-28 PROCEDURE — 2500000003 HC RX 250 WO HCPCS: Performed by: INTERNAL MEDICINE

## 2021-12-28 PROCEDURE — 76937 US GUIDE VASCULAR ACCESS: CPT

## 2021-12-28 PROCEDURE — 85730 THROMBOPLASTIN TIME PARTIAL: CPT

## 2021-12-28 PROCEDURE — 83036 HEMOGLOBIN GLYCOSYLATED A1C: CPT

## 2021-12-28 PROCEDURE — 36569 INSJ PICC 5 YR+ W/O IMAGING: CPT

## 2021-12-28 PROCEDURE — 93010 ELECTROCARDIOGRAM REPORT: CPT | Performed by: INTERNAL MEDICINE

## 2021-12-28 PROCEDURE — 82947 ASSAY GLUCOSE BLOOD QUANT: CPT

## 2021-12-28 PROCEDURE — 2500000003 HC RX 250 WO HCPCS: Performed by: HOSPITALIST

## 2021-12-28 PROCEDURE — 6370000000 HC RX 637 (ALT 250 FOR IP): Performed by: HOSPITALIST

## 2021-12-28 PROCEDURE — 80048 BASIC METABOLIC PNL TOTAL CA: CPT

## 2021-12-28 PROCEDURE — C1751 CATH, INF, PER/CENT/MIDLINE: HCPCS

## 2021-12-28 RX ORDER — SODIUM CHLORIDE 0.9 % (FLUSH) 0.9 %
5-40 SYRINGE (ML) INJECTION PRN
Status: DISCONTINUED | OUTPATIENT
Start: 2021-12-28 | End: 2021-12-28 | Stop reason: SDUPTHER

## 2021-12-28 RX ORDER — INSULIN GLARGINE 100 [IU]/ML
0.25 INJECTION, SOLUTION SUBCUTANEOUS NIGHTLY
Status: DISCONTINUED | OUTPATIENT
Start: 2021-12-28 | End: 2021-12-29

## 2021-12-28 RX ORDER — THIAMINE HYDROCHLORIDE 100 MG/ML
100 INJECTION, SOLUTION INTRAMUSCULAR; INTRAVENOUS DAILY
Status: DISCONTINUED | OUTPATIENT
Start: 2021-12-28 | End: 2022-01-03 | Stop reason: HOSPADM

## 2021-12-28 RX ORDER — SODIUM CHLORIDE 9 MG/ML
25 INJECTION, SOLUTION INTRAVENOUS PRN
Status: DISCONTINUED | OUTPATIENT
Start: 2021-12-28 | End: 2022-01-03 | Stop reason: SDUPTHER

## 2021-12-28 RX ORDER — DEXMEDETOMIDINE HYDROCHLORIDE 4 UG/ML
.2-1.4 INJECTION, SOLUTION INTRAVENOUS CONTINUOUS
Status: DISCONTINUED | OUTPATIENT
Start: 2021-12-28 | End: 2021-12-30

## 2021-12-28 RX ORDER — FOLIC ACID 1 MG/1
1 TABLET ORAL DAILY
Status: DISCONTINUED | OUTPATIENT
Start: 2021-12-29 | End: 2022-01-03 | Stop reason: HOSPADM

## 2021-12-28 RX ORDER — DEXTROSE MONOHYDRATE 50 MG/ML
100 INJECTION, SOLUTION INTRAVENOUS PRN
Status: DISCONTINUED | OUTPATIENT
Start: 2021-12-28 | End: 2021-12-29

## 2021-12-28 RX ORDER — SODIUM CHLORIDE 0.9 % (FLUSH) 0.9 %
5-40 SYRINGE (ML) INJECTION EVERY 12 HOURS SCHEDULED
Status: DISCONTINUED | OUTPATIENT
Start: 2021-12-28 | End: 2021-12-28 | Stop reason: SDUPTHER

## 2021-12-28 RX ORDER — NICOTINE POLACRILEX 4 MG
15 LOZENGE BUCCAL PRN
Status: DISCONTINUED | OUTPATIENT
Start: 2021-12-28 | End: 2021-12-29 | Stop reason: SDUPTHER

## 2021-12-28 RX ORDER — DEXTROSE MONOHYDRATE 25 G/50ML
12.5 INJECTION, SOLUTION INTRAVENOUS PRN
Status: DISCONTINUED | OUTPATIENT
Start: 2021-12-28 | End: 2021-12-29 | Stop reason: SDUPTHER

## 2021-12-28 RX ORDER — LIDOCAINE HYDROCHLORIDE 10 MG/ML
5 INJECTION, SOLUTION EPIDURAL; INFILTRATION; INTRACAUDAL; PERINEURAL ONCE
Status: DISCONTINUED | OUTPATIENT
Start: 2021-12-28 | End: 2021-12-30

## 2021-12-28 RX ORDER — POTASSIUM CHLORIDE 7.45 MG/ML
10 INJECTION INTRAVENOUS
Status: DISPENSED | OUTPATIENT
Start: 2021-12-28 | End: 2021-12-28

## 2021-12-28 RX ADMIN — SODIUM CHLORIDE 5.49 UNITS/HR: 9 INJECTION, SOLUTION INTRAVENOUS at 16:15

## 2021-12-28 RX ADMIN — POTASSIUM CHLORIDE 10 MEQ: 7.46 INJECTION, SOLUTION INTRAVENOUS at 08:04

## 2021-12-28 RX ADMIN — SODIUM PHOSPHATE, MONOBASIC, MONOHYDRATE 20 MMOL: 276; 142 INJECTION, SOLUTION INTRAVENOUS at 22:34

## 2021-12-28 RX ADMIN — AZITHROMYCIN MONOHYDRATE 250 MG: 500 INJECTION, POWDER, LYOPHILIZED, FOR SOLUTION INTRAVENOUS at 16:13

## 2021-12-28 RX ADMIN — SODIUM CHLORIDE 10.8 UNITS/HR: 9 INJECTION, SOLUTION INTRAVENOUS at 01:35

## 2021-12-28 RX ADMIN — SODIUM CHLORIDE 7.71 UNITS/HR: 9 INJECTION, SOLUTION INTRAVENOUS at 08:02

## 2021-12-28 RX ADMIN — DEXMEDETOMIDINE HYDROCHLORIDE 0.7 MCG/KG/HR: 4 INJECTION, SOLUTION INTRAVENOUS at 16:11

## 2021-12-28 RX ADMIN — THIAMINE HYDROCHLORIDE 100 MG: 100 INJECTION, SOLUTION INTRAMUSCULAR; INTRAVENOUS at 11:29

## 2021-12-28 RX ADMIN — POTASSIUM CHLORIDE 10 MEQ: 10 INJECTION, SOLUTION INTRAVENOUS at 21:42

## 2021-12-28 RX ADMIN — POTASSIUM CHLORIDE 10 MEQ: 10 INJECTION, SOLUTION INTRAVENOUS at 12:07

## 2021-12-28 RX ADMIN — POTASSIUM BICARBONATE 40 MEQ: 782 TABLET, EFFERVESCENT ORAL at 04:17

## 2021-12-28 RX ADMIN — INSULIN GLARGINE 19 UNITS: 100 INJECTION, SOLUTION SUBCUTANEOUS at 20:12

## 2021-12-28 RX ADMIN — POTASSIUM CHLORIDE 10 MEQ: 10 INJECTION, SOLUTION INTRAVENOUS at 20:09

## 2021-12-28 RX ADMIN — ENOXAPARIN SODIUM 30 MG: 100 INJECTION SUBCUTANEOUS at 20:11

## 2021-12-28 RX ADMIN — POTASSIUM CHLORIDE 10 MEQ: 10 INJECTION, SOLUTION INTRAVENOUS at 14:50

## 2021-12-28 RX ADMIN — DEXTROSE, SODIUM CHLORIDE, AND POTASSIUM CHLORIDE: 5; .45; .15 INJECTION INTRAVENOUS at 08:05

## 2021-12-28 RX ADMIN — DEXMEDETOMIDINE HYDROCHLORIDE 0.4 MCG/KG/HR: 4 INJECTION, SOLUTION INTRAVENOUS at 12:11

## 2021-12-28 RX ADMIN — METOPROLOL TARTRATE 2.5 MG: 5 INJECTION INTRAVENOUS at 01:13

## 2021-12-28 RX ADMIN — LORAZEPAM 0.5 MG: 2 INJECTION INTRAMUSCULAR; INTRAVENOUS at 01:17

## 2021-12-28 RX ADMIN — DEXTROSE, SODIUM CHLORIDE, AND POTASSIUM CHLORIDE: 5; .45; .15 INJECTION INTRAVENOUS at 15:55

## 2021-12-28 RX ADMIN — POTASSIUM CHLORIDE 10 MEQ: 10 INJECTION, SOLUTION INTRAVENOUS at 13:18

## 2021-12-28 RX ADMIN — POTASSIUM CHLORIDE 10 MEQ: 10 INJECTION, SOLUTION INTRAVENOUS at 18:41

## 2021-12-28 RX ADMIN — POTASSIUM CHLORIDE 10 MEQ: 10 INJECTION, SOLUTION INTRAVENOUS at 10:08

## 2021-12-28 RX ADMIN — POTASSIUM CHLORIDE 10 MEQ: 10 INJECTION, SOLUTION INTRAVENOUS at 04:07

## 2021-12-28 RX ADMIN — METOPROLOL TARTRATE 2.5 MG: 5 INJECTION INTRAVENOUS at 02:29

## 2021-12-28 RX ADMIN — POTASSIUM CHLORIDE 10 MEQ: 10 INJECTION, SOLUTION INTRAVENOUS at 03:04

## 2021-12-28 RX ADMIN — DEXAMETHASONE SODIUM PHOSPHATE 4 MG: 10 INJECTION INTRAMUSCULAR; INTRAVENOUS at 06:29

## 2021-12-28 RX ADMIN — DEXAMETHASONE SODIUM PHOSPHATE 4 MG: 10 INJECTION INTRAMUSCULAR; INTRAVENOUS at 11:29

## 2021-12-28 RX ADMIN — SODIUM PHOSPHATE, MONOBASIC, MONOHYDRATE 20 MMOL: 276; 142 INJECTION, SOLUTION INTRAVENOUS at 10:44

## 2021-12-28 RX ADMIN — POTASSIUM CHLORIDE 10 MEQ: 10 INJECTION, SOLUTION INTRAVENOUS at 01:33

## 2021-12-28 RX ADMIN — DEXTROSE, SODIUM CHLORIDE, AND POTASSIUM CHLORIDE: 5; .45; .15 INJECTION INTRAVENOUS at 02:22

## 2021-12-28 RX ADMIN — DEXAMETHASONE SODIUM PHOSPHATE 4 MG: 10 INJECTION INTRAMUSCULAR; INTRAVENOUS at 01:05

## 2021-12-28 RX ADMIN — POTASSIUM CHLORIDE 10 MEQ: 10 INJECTION, SOLUTION INTRAVENOUS at 17:41

## 2021-12-28 RX ADMIN — Medication 10 ML: at 08:11

## 2021-12-28 ASSESSMENT — PAIN SCALES - GENERAL
PAINLEVEL_OUTOF10: 0
PAINLEVEL_OUTOF10: 0
PAINLEVEL_OUTOF10: 1

## 2021-12-28 NOTE — PROGRESS NOTES
Pharmacy Consult      Queen Lina is a 40 y.o. male for whom pharmacy has been consulted to dose baricitinib. Patient Active Problem List   Diagnosis    DKA, type 1, not at goal [facility])       Allergies:  Patient has no known allergies. Ht/Wt:   Ht Readings from Last 1 Encounters:   12/27/21 5' 9\" (1.753 m)        Wt Readings from Last 1 Encounters:   12/27/21 170 lb (77.1 kg)         Recent Labs     12/27/21  0038 12/27/21  0950 12/27/21  1409 12/27/21  1815 12/28/21  0012   LABGLOM >60   < > >60 55* 60*   GFRAA >59   < > >59 >59 >59   LYMPHSABS 1.2  --   --   --   --    NEUTROABS 13.7*  --   --   --   --    ALT 36  --  33  --   --    AST 22  --  32  --   --     < > = values in this interval not displayed. Assessment/Plan:    Start patient on baricitinib 4 mg daily for 14 days of total treatment or until hospital discharge, whichever is first. Pharmacy will continue to follow GFR, ALC, ANC and aminotransferases. Thank you for the consult.      Electronically signed by Chema Guerrero [facility] on 12/28/2021 at 5:50 AM

## 2021-12-28 NOTE — PROGRESS NOTES
Hospitalist Progress Note    Patient:  Stevie Carmona  YOB: 1975  Date of Service: 12/28/2021  MRN: [id number]   Acct: [de-identified]   Primary Care Physician: No primary care provider on file. Advance Directive: Full Code  Admit Date: 12/27/2021       Hospital Day: 1  Referring Provider: Lunette Litten, DO    Patient Seen, Chart, Consults, Notes, Labs, Radiology studies reviewed. Subjective:  Stevie Carmona is a 40 y.o. male  whom we are following for DKA, COVID infection, and encephalopathy. He has been confused and agitated since admission. I suspect he is going through alcohol withdrawal.    Allergies:  Patient has no known allergies.     Medicines:  Current Facility-Administered Medications   Medication Dose Route Frequency Provider Last Rate Last Admin    baricitinib (OLUMIANT) tablet 4 mg  4 mg Oral Daily Georgia Wahl MD        lidocaine PF 1 % injection 5 mL  5 mL IntraDERmal Once Georgia Wahl MD        0.9 % sodium chloride infusion  25 mL IntraVENous PRN Georgia Wahl MD        thiamine (B-1) injection 100 mg  100 mg IntraVENous Daily Lunette Litten, DO   100 mg at 12/28/21 1129    glucose (GLUTOSE) 40 % oral gel 15 g  15 g Oral PRN Lunette Litten, DO        dextrose 50 % IV solution  12.5 g IntraVENous PRN Lunette Litten, DO        glucagon (rDNA) injection 1 mg  1 mg IntraMUSCular PRN Lunette Litten, DO        dextrose 5 % solution  100 mL/hr IntraVENous PRN Lunette Litten, DO        insulin glargine (LANTUS) injection vial 19 Units  0.25 Units/kg SubCUTAneous Nightly Lunette Litten,         dexmedetomidine (PRECEDEX) 400 mcg in sodium chloride 0.9 % 100 mL infusion  0.2-1.4 mcg/kg/hr IntraVENous Continuous Lunette Litten, DO 13.5 mL/hr at 12/28/21 1549 0.7 mcg/kg/hr at 12/28/21 1549    sodium chloride flush 0.9 % injection 5-40 mL  5-40 mL IntraVENous 2 times per day Georgia Wahl MD   10 mL at 12/28/21 2964    0.9 % sodium chloride infusion  25 mL IntraVENous PRN Ruddy Angelo MD        acetaminophen (TYLENOL) tablet 650 mg  650 mg Oral Q6H PRN Ruddy Angelo MD        Or    acetaminophen (TYLENOL) suppository 650 mg  650 mg Rectal Q6H PRN Ruddy Angelo MD        potassium chloride 10 mEq/100 mL IVPB (Peripheral Line)  10 mEq IntraVENous PRN Ruddy Angelo  mL/hr at 12/28/21 1450 10 mEq at 12/28/21 1450    magnesium sulfate 1000 mg in dextrose 5% 100 mL IVPB  1,000 mg IntraVENous PRN Ruddy Angelo MD        sodium phosphate 10 mmol in dextrose 5 % 250 mL IVPB  10 mmol IntraVENous PRN Ruddy Angelo MD        Or    sodium phosphate 15 mmol in dextrose 5 % 250 mL IVPB  15 mmol IntraVENous PRN Ruddy Angelo MD        Or    sodium phosphate 20 mmol in dextrose 5 % 500 mL IVPB  20 mmol IntraVENous PRN Ruddy Angelo MD 83.3 mL/hr at 12/28/21 1400 Rate Verify at 12/28/21 1400    0.9 % sodium chloride infusion   IntraVENous Continuous Ruddy Angelo MD   Stopped at 12/27/21 2112    dextrose 5 % and 0.45 % NaCl with KCl 20 mEq infusion   IntraVENous Continuous PRN Ruddy Angelo  mL/hr at 12/28/21 1555 New Bag at 12/28/21 1555    insulin regular (HUMULIN R;NOVOLIN R) 100 Units in sodium chloride 0.9 % 100 mL infusion  0.1 Units/kg/hr IntraVENous Continuous Ruddy Angelo MD 13.1 mL/hr at 12/28/21 1446 13.05 Units/hr at 12/28/21 1446    dexamethasone (PF) (DECADRON) injection 4 mg  4 mg IntraVENous Q6H Ruddy Angelo MD   4 mg at 12/28/21 1129    enoxaparin (LOVENOX) injection 30 mg  30 mg SubCUTAneous BID Ruddy Angelo MD   30 mg at 12/27/21 2134    azithromycin (ZITHROMAX) 250 mg in dextrose 5 % 250 mL IVPB  250 mg IntraVENous Q24H Ruddy Angelo MD   Stopped at 12/27/21 1803    LORazepam (ATIVAN) injection 0.5 mg  0.5 mg IntraVENous Q4H PRN Ruddy Angelo MD   0.5 mg at 12/28/21 0117    sodium chloride flush 0.9 % injection 5-40 mL  5-40 mL IntraVENous PRN Gregor Nayak MD        ondansetron (ZOFRAN-ODT) disintegrating tablet 4 mg  4 mg Oral Q8H PRN Gregor Nayak MD        Or    ondansetron [facility] injection 4 mg  4 mg IntraVENous Q6H PRN Gregor Nayak MD        polyethylene glycol [facility]) packet 17 g  17 g Oral Daily PRN Gregor Nayak MD           Past Medical History:  History reviewed. No pertinent past medical history. Past Surgical History:  History reviewed. No pertinent surgical history. Family History  History reviewed. No pertinent family history. Social History  Social History     Socioeconomic History    Marital status: Single     Spouse name: Not on file    Number of children: Not on file    Years of education: Not on file    Highest education level: Not on file   Occupational History    Not on file   Tobacco Use    Smoking status: Never Smoker    Smokeless tobacco: Never Used   Substance and Sexual Activity    Alcohol use: Yes    Drug use: Not Currently    Sexual activity: Not on file   Other Topics Concern    Not on file   Social History Narrative    Not on file     Social Determinants of Health     Financial Resource Strain:     Difficulty of Paying Living Expenses: Not on file   Food Insecurity:     Worried About Running Out of Food in the Last Year: Not on file    Emy of Food in the Last Year: Not on file   Transportation Needs:     Lack of Transportation (Medical): Not on file    Lack of Transportation (Non-Medical):  Not on file   Physical Activity:     Days of Exercise per Week: Not on file    Minutes of Exercise per Session: Not on file   Stress:     Feeling of Stress : Not on file   Social Connections:     Frequency of Communication with Friends and Family: Not on file    Frequency of Social Gatherings with Friends and Family: Not on file    Attends Jehovah's witness Services: Not on file    Active Member of Clubs or Organizations: Not on file    Attends Club or Organization Meetings: Not on file    Marital Status: Not on file   Intimate Partner Violence:     Fear of Current or Ex-Partner: Not on file    Emotionally Abused: Not on file    Physically Abused: Not on file    Sexually Abused: Not on file   Housing Stability:     Unable to Pay for Housing in the Last Year: Not on file    Number of [place] in the Last Year: Not on file    Unstable Housing in the Last Year: Not on file         Review of Systems:    Review of Systems   Unable to perform ROS: Mental status change           Objective:  Blood pressure 106/62, pulse 99, temperature 98.4 °F (36.9 °C), temperature source Temporal, resp. rate 27, height 5' 9\" (1.753 m), weight 170 lb (77.1 kg), SpO2 94 %. Intake/Output Summary (Last 24 hours) at 12/28/2021 1558  Last data filed at 12/28/2021 1400  Gross per 24 hour   Intake 5972.2 ml   Output 7500 ml   Net -1527.8 ml       Physical Exam  Vitals and nursing note reviewed. Constitutional:       General: He is in acute distress. Appearance: He is ill-appearing. HENT:      Head: Normocephalic and atraumatic. Right Ear: External ear normal.      Left Ear: External ear normal.      Nose: Nose normal.      Mouth/Throat:      Mouth: Mucous membranes are moist.   Eyes:      Conjunctiva/sclera: Conjunctivae normal.      Pupils: Pupils are equal, round, and reactive to light. Cardiovascular:      Rate and Rhythm: Normal rate and regular rhythm. Heart sounds: Normal heart sounds. Pulmonary:      Effort: Pulmonary effort is normal.      Breath sounds: Normal breath sounds. Abdominal:      General: Abdomen is flat. Palpations: Abdomen is soft. Musculoskeletal:         General: No swelling. Cervical back: Neck supple. No rigidity. Skin:     General: Skin is warm and dry. Neurological:      Mental Status: He is disoriented. 12/27/21  1146   BC No Growth to date. Any change in status will be called. BLOODCULT2 No Growth to date. Any change in status will be called. No results for input(s): CXSURG in the last 72 hours. Radiology reports as per the Radiologist  Radiology: XR LUMBAR SPINE (2-3 VIEWS)    Result Date: 12/27/2021  EXAMINATION: XR LUMBAR SPINE (2-3 VIEWS) 12/27/2021 9:03 AM HISTORY: Severe back pain COMPARISON: None FINDINGS: There are 5 nonrib-bearing vertebral bodies. There is nonspecific straightening of the normal lumbar lordosis. Alignment of lumbar spine appears normal. Vertebral body heights appear well maintained. There is minimal loss of normal disc space I at L4-L5. Minimal endplate degenerative spurring is evident. Minimal degenerative disease without acute osseous abnormality. Signed by Dr Agustin Uriostegui    [facility] [address] [facility]    Result Date: 12/27/2021  EXAM: CT chest angiography with 3D MIP images with IV contrast INDICATION: Chest pain COMPARISON: None available. DLP: 735 mGy cm. In order to have a CT radiation dose as low as reasonably achievable, Automated Exposure Control was utilized for adjustment of the mA and/or KV according to patient size. FINDINGS: No evidence of pulmonary embolus. Main pulmonary artery is nondilated. Thoracic aorta is nonaneurysmal. No pericardial effusion. Central airways are clear. Patchy bilateral areas of groundglass opacity. No consolidation. No pleural effusion. No isolated solid pulmonary nodule. Mildly enlarged mediastinal lymph nodes are presumably reactive. No large thyroid nodule. No acute chest wall soft tissue abnormality. Hepatic steatosis. 7 cm RIGHT adrenal gland mass with macroscopic fat, compatible with myelolipoma. Liver is steatotic. Partial imaged diastases rectus in the upper abdomen. Multiple old right-sided rib fractures and old right-sided clavicle fracture. 1.  No evidence of pulmonary embolus.  2.  Patchy bilateral groundglass opacity, likely representing COVID 19 pneumonia. 3.  7 cm RIGHT adrenal gland myelolipoma. 4.  Hepatic steatosis. Findings in agreement with the emergent findings from the initial [facility] preliminary report. Signed by Dr Mac Segovia     DKA, type 1, not at goal.  Continue DKA pathway. Remains acidemic. Encephalopathy. I suspect ETOH withdrawal.  Precedex infusion. Thiamine. COVID infection. S/P Regeneron 12/27. Supportive care. Please document 40 minutes of critical care time.       Lunette Litten,

## 2021-12-28 NOTE — PROGRESS NOTES
Results for Trae Wilde (MRN [id number]) as of 12/27/2021 18:38   Ref.  Range 12/27/2021 18:35   Hemoglobin, Art, Extended Latest Ref Range: 14.0 - 18.0 g/dL 16.4   pH, Arterial Latest Ref Range: 7.350 - 7.450  7.080 (LL)   pCO2, Arterial Latest Ref Range: 35.0 - 45.0 mmHg 19.0 (LL)   pO2, Arterial Latest Ref Range: 80.0 - 100.0 mmHg 56.0 (L)   HCO3, Arterial Latest Ref Range: 22.0 - 26.0 mmol/L 5.6 (L)   Base Excess, Arterial Latest Ref Range: -2.0 - 2.0 mmol/L -22.5 (L)   O2 Sat, Arterial Latest Ref Range: >92 % 92.5   O2 Content, Arterial Latest Ref Range: Not Established mL/dL 21.3   Methemoglobin, Arterial Latest Ref Range: <1.5 % 1.5   Carboxyhgb, Arterial Latest Ref Range: 0.0 - 5.0 % 1.8   Nasal Cannula 3 lpm LR AT+

## 2021-12-28 NOTE — PROCEDURES
[facility] Vascular Access Team:  PICC Line Insertion Procedure Note    Curt Murphy  Admitted - 12/27/2021 12:27 AM  Admission Diagnosis -   DKA, type 1, not at goal [facility]) [E10.10]  Diabetic ketoacidosis without coma associated with other specified diabetes mellitus ([facility] [address] [E13.10]  COVID-19 virus infection [U07.1]    Attending Physician - Nhan De Luna DO  Ordering Physician - Nhan De Luna DO    Active LDAs -   PICC Double Lumen 65/58/69 Right Cephalic (Active)   Number of days: 0       Peripheral IV 12/28/21 Right Wrist (Active)   Number of days: 0       Procedure: Insertion of 5.5 [place] Double Lumen PICC    Indications:    Long Term IV therapy, Poor Access    Procedure Details:  Informed consent was obtained for the procedure, including sedation. Risks of lung perforation, hemorrhage, and adverse drug reaction were discussed. 5.5 [place] Double Lumen PICC inserted to the Right Cephalic per hospital protocol. Blood return: yes    Findings: The Right Cephalic vessel was visualized using the ultrasound and deemed a suitable vessel. The area was prepped with Chlorhexidine and draped in sterile fashion using full max barrier draping. The area was anesthetized with 3 cc's of 1% Lidocaine. The vessel was accessed using US guidance. The guidewire was advanced through the needle to secure the vessel. The needle was removed and a peel-a-way Sheath was placed over the guidewire. Guidewire was removed with atraumatic tip intact. The 5.5 [place] Double Lumen PICC was trimmed at 26 cm and inserted into the Sheath. Using VPS technology, the PICC line was advanced until PICC tip was in the SVC. The peel-a-way sheath was removed and PICC was inserted until the CAJ was reached. The PICC was advanced until P wave deflection was captured. The PICC was withdrawn until the optimal P wave amplitude was obtained. Approximately 1 cm exposed.  Internal components of the PICC were removed, all lumens had brisk blood return and was flushed with 10 cc of NS. The PICC was secured using a securement device and a Biopatch was placed over the insertion site. A Tegaderm was placed over the securement device and insertion site. Dressing was dated and initialed with external measurement marked. Patient did tolerate procedure well. Recommendations:  PICC Line is ready to be used. Please change tubing prior to using new PICC line. Make sure to label, date and use alcohol caps on new tubing and alcohol caps on unused ports.              Electronically Signed By: Duong Zheng RN on 12/28/2021 at 2:27 PM

## 2021-12-28 NOTE — ACP (ADVANCE CARE PLANNING)
Advance Care Planning     Advance Care Planning Activator (Inpatient)  Conversation Note      Date of ACP Conversation: 12/28/2021     Conversation Conducted with: Patient's mom Arleth Hoffman    ACP Activator: Ce Saldana      Current Designated Health Care Decision Maker:     Primary Decision Maker: Jahaira Damian - Parent - [phone]    Primary Decision Maker: Tianna Rico - Parent - [phone]    Secondary Decision Maker: Maryse Carr - Brother/Sister    Supplemental (Other) Decision Maker: Madhuri Adame - Other - [phone]      Care Preferences    Ventilation: \"If you were in your present state of health and suddenly became very ill and were unable to breathe on your own, what would your preference be about the use of a ventilator (breathing machine) if it were available to you? \"      Would the patient desire the use of ventilator (breathing machine)?: Yes    \"If your health worsens and it becomes clear that your chance of recovery is unlikely, what would your preference be about the use of a ventilator (breathing machine) if it were available to you? \"     Would the patient desire the use of ventilator (breathing machine)?: Yes      Resuscitation  \"CPR works best to restart the heart when there is a sudden event, like a heart attack, in someone who is otherwise healthy. Unfortunately, CPR does not typically restart the heart for people who have serious health conditions or who are very sick. \"    \"In the event your heart stopped as a result of an underlying serious health condition, would you want attempts to be made to restart your heart (answer \"yes\" for attempt to resuscitate) or would you prefer a natural death (answer \"no\" for do not attempt to resuscitate)? \" Yes       [] Yes   [x] No   Educated Patient / Pankaj Reynoso regarding differences between Advance Directives and portable DNR orders.         Conversation Outcomes:  [x] ACP discussion completed    Electronically signed by Ce Saldana on 12/28/2021 at 11:58 AM

## 2021-12-28 NOTE — PROGRESS NOTES
Pt continues to be agitated and pulling off wires and pulling out IVs. Has removed multiple IVs hisself. Pt's condition seemed to decline after ativan administration. K+ remains critically low at 2.2, currently receiving IV replacement per protocol as well as sodium phosphate. Dr. Jazlyn Chester notified of pt's change in condition.      Electronically signed by Beryle Led, RN on 12/28/2021 at 3:08 AM

## 2021-12-28 NOTE — PROGRESS NOTES
Results for Ruy Pizano (MRN [id number]) as of 12/27/2021 21:52   Ref.  Range 12/27/2021 21:49   Hemoglobin, Art, Extended Latest Ref Range: 14.0 - 18.0 g/dL 16.8   pH, Arterial Latest Ref Range: 7.350 - 7.450  7.160 (LL)   pCO2, Arterial Latest Ref Range: 35.0 - 45.0 mmHg 21.0 (L)   pO2, Arterial Latest Ref Range: 80.0 - 100.0 mmHg 64.0 (L)   HCO3, Arterial Latest Ref Range: 22.0 - 26.0 mmol/L 7.5 (L)   Base Excess, Arterial Latest Ref Range: -2.0 - 2.0 mmol/L -19.1 (L)   O2 Sat, Arterial Latest Ref Range: >92 % 94.1   O2 Content, Arterial Latest Ref Range: Not Established mL/dL 22.2   Methemoglobin, Arterial Latest Ref Range: <1.5 % 1.5   Carboxyhgb, Arterial Latest Ref Range: 0.0 - 5.0 % 1.9   Nasal Cannula 5 lpm, LR + AT

## 2021-12-28 NOTE — PROGRESS NOTES
Received orders to hold insulin gtt from Dr. Genaro Sterling via secure message. Currently replacing potassium.     Electronically signed by Elina Bradley RN on 12/28/2021 at 3:25 AM

## 2021-12-28 NOTE — ED NOTES
RN assumed care of patient at this time. Patient very altered. Only able to tell RN his name. Lethargic, alert to painful stimuli. Patient had indwelling cath in place, is on 3 L NC. RR are 30 per min, and pulse is 145. Per RN Key Pearson, none of this is new, and MD is aware of patient condition.       Shiraz Navarrete RN  12/27/21 4161

## 2021-12-28 NOTE — PROGRESS NOTES
Unable to obtain AM labs. Phlebotomy notified and was able to obtain specimens.  on accuchek, Dr. Jose A Lipscomb notified and ordered to continue holding insulin until full chemistry panel has resulted.      Electronically signed by Wandy Hess RN on 12/28/2021 at 6:28 AM

## 2021-12-28 NOTE — PROGRESS NOTES
Physician Progress Note      PATIENTThedaisy Spring  CSN #:                  [id number]  :                       1975  ADMIT DATE:       2021 12:27 AM  DISCH DATE:  RESPONDING  PROVIDER #:        Sallie Leggett [address] [facility]          QUERY TEXT:    Pt admitted with COVID-19 and noted to have SIRS. If possible, please   document in progress notes and discharge summary if you are evaluating and/or   treating: The medical record reflects the following:  Risk Factors: COVID-19 pneumonia  Clinical Indicators: T 101.1, P 143, R 36, /58, O2 sat 92%, WBC 16.1,   CRP 15.78, Covid +, CT chest-patchy bilateral groundglass opacity  Treatment: NS 1000 ml IV bolus x2, O2, ABG's, blood cultures, sputum culture,   Decadron, Zithromax IV    Thank you,  Ana Cristina Gipson RN, CCDS  [phone]  Options provided:  -- Sepsis present on admission due to COVID-19 pneumonia  -- Covid-19 pneumonia without sepsis  -- Other - I will add my own diagnosis  -- Disagree - Not applicable / Not valid  -- Disagree - Clinically unable to determine / Unknown  -- Refer to Clinical Documentation Reviewer    PROVIDER RESPONSE TEXT:    Provider disagreed with this query.   DKA with inadvertent diagnosis of COVID    Query created by: Marianela Rojas on 2021 10:09 AM      Electronically signed by:  Kanika Stanford DO 2021 6:15 PM

## 2021-12-29 ENCOUNTER — APPOINTMENT (OUTPATIENT)
Dept: GENERAL RADIOLOGY | Age: 46
DRG: 177 | End: 2021-12-29

## 2021-12-29 LAB
ANION GAP SERPL CALCULATED.3IONS-SCNC: 16 MMOL/L (ref 7–19)
ANION GAP SERPL CALCULATED.3IONS-SCNC: 9 MMOL/L (ref 7–19)
BASE EXCESS ARTERIAL: -8.7 MMOL/L (ref -2–2)
BASOPHILS ABSOLUTE: 0 K/UL (ref 0–0.2)
BASOPHILS RELATIVE PERCENT: 0.2 % (ref 0–1)
BUN BLDV-MCNC: 18 MG/DL (ref 6–20)
BUN BLDV-MCNC: 21 MG/DL (ref 6–20)
CALCIUM SERPL-MCNC: 7.8 MG/DL (ref 8.6–10)
CALCIUM SERPL-MCNC: 8.1 MG/DL (ref 8.6–10)
CARBOXYHEMOGLOBIN ARTERIAL: 2 % (ref 0–5)
CHLORIDE BLD-SCNC: 113 MMOL/L (ref 98–111)
CHLORIDE BLD-SCNC: 118 MMOL/L (ref 98–111)
CO2: 12 MMOL/L (ref 22–29)
CO2: 22 MMOL/L (ref 22–29)
CREAT SERPL-MCNC: 1 MG/DL (ref 0.5–1.2)
CREAT SERPL-MCNC: 1 MG/DL (ref 0.5–1.2)
EOSINOPHILS ABSOLUTE: 0 K/UL (ref 0–0.6)
EOSINOPHILS RELATIVE PERCENT: 0 % (ref 0–5)
GFR AFRICAN AMERICAN: >59
GFR AFRICAN AMERICAN: >59
GFR NON-AFRICAN AMERICAN: >60
GFR NON-AFRICAN AMERICAN: >60
GLUCOSE BLD-MCNC: 111 MG/DL (ref 74–109)
GLUCOSE BLD-MCNC: 119 MG/DL (ref 70–99)
GLUCOSE BLD-MCNC: 124 MG/DL (ref 70–99)
GLUCOSE BLD-MCNC: 126 MG/DL (ref 70–99)
GLUCOSE BLD-MCNC: 211 MG/DL (ref 70–99)
GLUCOSE BLD-MCNC: 212 MG/DL (ref 70–99)
GLUCOSE BLD-MCNC: 294 MG/DL (ref 70–99)
GLUCOSE BLD-MCNC: 335 MG/DL (ref 70–99)
GLUCOSE BLD-MCNC: 373 MG/DL (ref 74–109)
GLUCOSE BLD-MCNC: 92 MG/DL (ref 70–99)
HCO3 ARTERIAL: 13 MMOL/L (ref 22–26)
HCT VFR BLD CALC: 40.2 % (ref 42–52)
HEMOGLOBIN, ART, EXTENDED: 15.7 G/DL (ref 14–18)
HEMOGLOBIN: 14 G/DL (ref 14–18)
IMMATURE GRANULOCYTES #: 0.1 K/UL
LYMPHOCYTES ABSOLUTE: 0.6 K/UL (ref 1.1–4.5)
LYMPHOCYTES RELATIVE PERCENT: 5 % (ref 20–40)
MAGNESIUM: 1.9 MG/DL (ref 1.6–2.6)
MAGNESIUM: 2.1 MG/DL (ref 1.6–2.6)
MCH RBC QN AUTO: 29.8 PG (ref 27–31)
MCHC RBC AUTO-ENTMCNC: 34.8 G/DL (ref 33–37)
MCV RBC AUTO: 85.5 FL (ref 80–94)
METHEMOGLOBIN ARTERIAL: 1.2 %
MONOCYTES ABSOLUTE: 1 K/UL (ref 0–0.9)
MONOCYTES RELATIVE PERCENT: 7.7 % (ref 0–10)
NEUTROPHILS ABSOLUTE: 11.1 K/UL (ref 1.5–7.5)
NEUTROPHILS RELATIVE PERCENT: 86.1 % (ref 50–65)
O2 CONTENT ARTERIAL: 20.5 ML/DL
O2 SAT, ARTERIAL: 93.2 %
O2 THERAPY: ABNORMAL
PCO2 ARTERIAL: 20 MMHG (ref 35–45)
PDW BLD-RTO: 13.1 % (ref 11.5–14.5)
PERFORMED ON: ABNORMAL
PERFORMED ON: NORMAL
PH ARTERIAL: 7.42 (ref 7.35–7.45)
PHOSPHORUS: 0.9 MG/DL (ref 2.5–4.5)
PHOSPHORUS: 1.3 MG/DL (ref 2.5–4.5)
PLATELET # BLD: 143 K/UL (ref 130–400)
PMV BLD AUTO: 10.5 FL (ref 9.4–12.4)
PO2 ARTERIAL: 59 MMHG (ref 80–100)
POTASSIUM REFLEX MAGNESIUM: 3.7 MMOL/L (ref 3.5–5)
POTASSIUM SERPL-SCNC: 2.7 MMOL/L (ref 3.5–5)
POTASSIUM, WHOLE BLOOD: 3.3
RBC # BLD: 4.7 M/UL (ref 4.7–6.1)
SODIUM BLD-SCNC: 141 MMOL/L (ref 136–145)
SODIUM BLD-SCNC: 149 MMOL/L (ref 136–145)
URINE CULTURE, ROUTINE: NORMAL
WBC # BLD: 12.8 K/UL (ref 4.8–10.8)

## 2021-12-29 PROCEDURE — 2500000003 HC RX 250 WO HCPCS: Performed by: INTERNAL MEDICINE

## 2021-12-29 PROCEDURE — 36569 INSJ PICC 5 YR+ W/O IMAGING: CPT

## 2021-12-29 PROCEDURE — 6360000002 HC RX W HCPCS: Performed by: HOSPITALIST

## 2021-12-29 PROCEDURE — 6370000000 HC RX 637 (ALT 250 FOR IP): Performed by: INTERNAL MEDICINE

## 2021-12-29 PROCEDURE — 84100 ASSAY OF PHOSPHORUS: CPT

## 2021-12-29 PROCEDURE — 85025 COMPLETE CBC W/AUTO DIFF WBC: CPT

## 2021-12-29 PROCEDURE — 82947 ASSAY GLUCOSE BLOOD QUANT: CPT

## 2021-12-29 PROCEDURE — 83735 ASSAY OF MAGNESIUM: CPT

## 2021-12-29 PROCEDURE — 76937 US GUIDE VASCULAR ACCESS: CPT

## 2021-12-29 PROCEDURE — 36600 WITHDRAWAL OF ARTERIAL BLOOD: CPT

## 2021-12-29 PROCEDURE — 2580000003 HC RX 258: Performed by: HOSPITALIST

## 2021-12-29 PROCEDURE — 6370000000 HC RX 637 (ALT 250 FOR IP): Performed by: HOSPITALIST

## 2021-12-29 PROCEDURE — 2580000003 HC RX 258: Performed by: INTERNAL MEDICINE

## 2021-12-29 PROCEDURE — 71045 X-RAY EXAM CHEST 1 VIEW: CPT

## 2021-12-29 PROCEDURE — 2700000000 HC OXYGEN THERAPY PER DAY

## 2021-12-29 PROCEDURE — 82803 BLOOD GASES ANY COMBINATION: CPT

## 2021-12-29 PROCEDURE — 2100000000 HC CCU R&B

## 2021-12-29 PROCEDURE — C1751 CATH, INF, PER/CENT/MIDLINE: HCPCS

## 2021-12-29 PROCEDURE — 6360000002 HC RX W HCPCS: Performed by: INTERNAL MEDICINE

## 2021-12-29 PROCEDURE — 80048 BASIC METABOLIC PNL TOTAL CA: CPT

## 2021-12-29 PROCEDURE — 02HV33Z INSERTION OF INFUSION DEVICE INTO SUPERIOR VENA CAVA, PERCUTANEOUS APPROACH: ICD-10-PCS | Performed by: INTERNAL MEDICINE

## 2021-12-29 PROCEDURE — 6360000002 HC RX W HCPCS

## 2021-12-29 PROCEDURE — 84132 ASSAY OF SERUM POTASSIUM: CPT

## 2021-12-29 RX ORDER — MULTIVITAMIN WITH IRON
1 TABLET ORAL DAILY
Status: DISCONTINUED | OUTPATIENT
Start: 2021-12-29 | End: 2022-01-03 | Stop reason: HOSPADM

## 2021-12-29 RX ORDER — SODIUM CHLORIDE 0.9 % (FLUSH) 0.9 %
5-40 SYRINGE (ML) INJECTION EVERY 12 HOURS SCHEDULED
Status: DISCONTINUED | OUTPATIENT
Start: 2021-12-29 | End: 2022-01-03 | Stop reason: HOSPADM

## 2021-12-29 RX ORDER — LORAZEPAM 1 MG/1
1 TABLET ORAL
Status: DISCONTINUED | OUTPATIENT
Start: 2021-12-29 | End: 2021-12-30

## 2021-12-29 RX ORDER — LORAZEPAM 1 MG/1
4 TABLET ORAL
Status: DISCONTINUED | OUTPATIENT
Start: 2021-12-29 | End: 2021-12-30

## 2021-12-29 RX ORDER — MAGNESIUM SULFATE 1 G/100ML
1000 INJECTION INTRAVENOUS PRN
Status: DISCONTINUED | OUTPATIENT
Start: 2021-12-29 | End: 2022-01-01

## 2021-12-29 RX ORDER — SODIUM CHLORIDE, SODIUM LACTATE, POTASSIUM CHLORIDE, CALCIUM CHLORIDE 600; 310; 30; 20 MG/100ML; MG/100ML; MG/100ML; MG/100ML
INJECTION, SOLUTION INTRAVENOUS CONTINUOUS
Status: DISCONTINUED | OUTPATIENT
Start: 2021-12-29 | End: 2021-12-30

## 2021-12-29 RX ORDER — INSULIN GLARGINE 100 [IU]/ML
15 INJECTION, SOLUTION SUBCUTANEOUS NIGHTLY
Status: DISCONTINUED | OUTPATIENT
Start: 2021-12-29 | End: 2021-12-29

## 2021-12-29 RX ORDER — LORAZEPAM 1 MG/1
3 TABLET ORAL
Status: DISCONTINUED | OUTPATIENT
Start: 2021-12-29 | End: 2021-12-30

## 2021-12-29 RX ORDER — 0.9 % SODIUM CHLORIDE 0.9 %
15 INTRAVENOUS SOLUTION INTRAVENOUS ONCE
Status: DISCONTINUED | OUTPATIENT
Start: 2021-12-29 | End: 2021-12-29

## 2021-12-29 RX ORDER — POTASSIUM CHLORIDE 29.8 MG/ML
INJECTION INTRAVENOUS
Status: COMPLETED
Start: 2021-12-29 | End: 2021-12-29

## 2021-12-29 RX ORDER — SODIUM CHLORIDE 9 MG/ML
25 INJECTION, SOLUTION INTRAVENOUS PRN
Status: DISCONTINUED | OUTPATIENT
Start: 2021-12-29 | End: 2022-01-03 | Stop reason: HOSPADM

## 2021-12-29 RX ORDER — DEXTROSE AND SODIUM CHLORIDE 5; .45 G/100ML; G/100ML
INJECTION, SOLUTION INTRAVENOUS CONTINUOUS PRN
Status: DISCONTINUED | OUTPATIENT
Start: 2021-12-29 | End: 2022-01-03 | Stop reason: HOSPADM

## 2021-12-29 RX ORDER — LORAZEPAM 1 MG/1
2 TABLET ORAL
Status: DISCONTINUED | OUTPATIENT
Start: 2021-12-29 | End: 2021-12-30

## 2021-12-29 RX ORDER — SODIUM CHLORIDE 9 MG/ML
INJECTION, SOLUTION INTRAVENOUS CONTINUOUS
Status: DISCONTINUED | OUTPATIENT
Start: 2021-12-29 | End: 2021-12-29

## 2021-12-29 RX ORDER — LORAZEPAM 2 MG/ML
2 INJECTION INTRAMUSCULAR
Status: DISCONTINUED | OUTPATIENT
Start: 2021-12-29 | End: 2021-12-30

## 2021-12-29 RX ORDER — POTASSIUM CHLORIDE 7.45 MG/ML
10 INJECTION INTRAVENOUS PRN
Status: DISCONTINUED | OUTPATIENT
Start: 2021-12-29 | End: 2021-12-29 | Stop reason: SDUPTHER

## 2021-12-29 RX ORDER — NICOTINE POLACRILEX 4 MG
15 LOZENGE BUCCAL PRN
Status: DISCONTINUED | OUTPATIENT
Start: 2021-12-29 | End: 2022-01-03 | Stop reason: HOSPADM

## 2021-12-29 RX ORDER — DEXTROSE MONOHYDRATE 25 G/50ML
12.5 INJECTION, SOLUTION INTRAVENOUS PRN
Status: DISCONTINUED | OUTPATIENT
Start: 2021-12-29 | End: 2022-01-03 | Stop reason: HOSPADM

## 2021-12-29 RX ORDER — LORAZEPAM 2 MG/ML
4 INJECTION INTRAMUSCULAR
Status: DISCONTINUED | OUTPATIENT
Start: 2021-12-29 | End: 2021-12-30

## 2021-12-29 RX ORDER — DEXTROSE MONOHYDRATE 25 G/50ML
12.5 INJECTION, SOLUTION INTRAVENOUS PRN
Status: DISCONTINUED | OUTPATIENT
Start: 2021-12-29 | End: 2021-12-30

## 2021-12-29 RX ORDER — POTASSIUM CHLORIDE 29.8 MG/ML
20 INJECTION INTRAVENOUS PRN
Status: DISCONTINUED | OUTPATIENT
Start: 2021-12-29 | End: 2022-01-03 | Stop reason: SDUPTHER

## 2021-12-29 RX ORDER — LORAZEPAM 2 MG/ML
1 INJECTION INTRAMUSCULAR
Status: DISCONTINUED | OUTPATIENT
Start: 2021-12-29 | End: 2021-12-30

## 2021-12-29 RX ORDER — LIDOCAINE HYDROCHLORIDE 10 MG/ML
5 INJECTION, SOLUTION EPIDURAL; INFILTRATION; INTRACAUDAL; PERINEURAL ONCE
Status: DISCONTINUED | OUTPATIENT
Start: 2021-12-29 | End: 2021-12-30

## 2021-12-29 RX ORDER — DEXTROSE MONOHYDRATE 50 MG/ML
100 INJECTION, SOLUTION INTRAVENOUS PRN
Status: DISCONTINUED | OUTPATIENT
Start: 2021-12-29 | End: 2022-01-03 | Stop reason: HOSPADM

## 2021-12-29 RX ORDER — LORAZEPAM 2 MG/ML
3 INJECTION INTRAMUSCULAR
Status: DISCONTINUED | OUTPATIENT
Start: 2021-12-29 | End: 2021-12-30

## 2021-12-29 RX ORDER — SODIUM CHLORIDE 0.9 % (FLUSH) 0.9 %
5-40 SYRINGE (ML) INJECTION PRN
Status: DISCONTINUED | OUTPATIENT
Start: 2021-12-29 | End: 2022-01-03 | Stop reason: HOSPADM

## 2021-12-29 RX ADMIN — INSULIN LISPRO 6 UNITS: 100 INJECTION, SOLUTION INTRAVENOUS; SUBCUTANEOUS at 11:36

## 2021-12-29 RX ADMIN — POTASSIUM CHLORIDE 20 MEQ: 29.8 INJECTION INTRAVENOUS at 22:52

## 2021-12-29 RX ADMIN — ENOXAPARIN SODIUM 30 MG: 100 INJECTION SUBCUTANEOUS at 08:31

## 2021-12-29 RX ADMIN — DEXMEDETOMIDINE HYDROCHLORIDE 0.5 MCG/KG/HR: 4 INJECTION, SOLUTION INTRAVENOUS at 21:46

## 2021-12-29 RX ADMIN — INSULIN LISPRO 8 UNITS: 100 INJECTION, SOLUTION INTRAVENOUS; SUBCUTANEOUS at 08:33

## 2021-12-29 RX ADMIN — POTASSIUM CHLORIDE 20 MEQ: 29.8 INJECTION INTRAVENOUS at 21:49

## 2021-12-29 RX ADMIN — DEXMEDETOMIDINE HYDROCHLORIDE 1.4 MCG/KG/HR: 4 INJECTION, SOLUTION INTRAVENOUS at 09:50

## 2021-12-29 RX ADMIN — SODIUM CHLORIDE, POTASSIUM CHLORIDE, SODIUM LACTATE AND CALCIUM CHLORIDE: 600; 310; 30; 20 INJECTION, SOLUTION INTRAVENOUS at 16:00

## 2021-12-29 RX ADMIN — INSULIN GLARGINE 15 UNITS: 100 INJECTION, SOLUTION SUBCUTANEOUS at 05:29

## 2021-12-29 RX ADMIN — DEXMEDETOMIDINE HYDROCHLORIDE 0.4 MCG/KG/HR: 4 INJECTION, SOLUTION INTRAVENOUS at 11:00

## 2021-12-29 RX ADMIN — LORAZEPAM 0.5 MG: 2 INJECTION INTRAMUSCULAR; INTRAVENOUS at 03:59

## 2021-12-29 RX ADMIN — Medication 10 ML: at 03:14

## 2021-12-29 RX ADMIN — DEXTROSE AND SODIUM CHLORIDE: 5; 450 INJECTION, SOLUTION INTRAVENOUS at 20:48

## 2021-12-29 RX ADMIN — SODIUM PHOSPHATE, MONOBASIC, MONOHYDRATE 20 MMOL: 276; 142 INJECTION, SOLUTION INTRAVENOUS at 23:13

## 2021-12-29 RX ADMIN — THIAMINE HYDROCHLORIDE 100 MG: 100 INJECTION, SOLUTION INTRAMUSCULAR; INTRAVENOUS at 08:31

## 2021-12-29 RX ADMIN — SODIUM CHLORIDE, PRESERVATIVE FREE 10 ML: 5 INJECTION INTRAVENOUS at 20:00

## 2021-12-29 RX ADMIN — AZITHROMYCIN MONOHYDRATE 250 MG: 500 INJECTION, POWDER, LYOPHILIZED, FOR SOLUTION INTRAVENOUS at 17:17

## 2021-12-29 RX ADMIN — POTASSIUM CHLORIDE 10 MEQ: 10 INJECTION, SOLUTION INTRAVENOUS at 02:02

## 2021-12-29 RX ADMIN — SODIUM CHLORIDE 0.1 UNITS/KG/HR: 9 INJECTION, SOLUTION INTRAVENOUS at 17:31

## 2021-12-29 RX ADMIN — POTASSIUM CHLORIDE 20 MEQ: 29.8 INJECTION INTRAVENOUS at 21:07

## 2021-12-29 RX ADMIN — POTASSIUM CHLORIDE 20 MEQ: 29.8 INJECTION, SOLUTION INTRAVENOUS at 21:07

## 2021-12-29 ASSESSMENT — PAIN SCALES - GENERAL: PAINLEVEL_OUTOF10: 0

## 2021-12-29 NOTE — PROGRESS NOTES
Patient attempting to get out of bed. Patient removed restraints, Picc line, and peripheral IV's. New IV placed and precedex increased to 0.6. Patient more calm and resting in bed.      Electronically signed by Diamond Blum RN on 12/29/2021 at 6:32 AM

## 2021-12-29 NOTE — PROGRESS NOTES
Precedex gtt off for a few hours. Patient alert and orientated. Stating that he is anxious with a HR of 120's. Patient is shallow breathing. PRN ativan given. However, Ativan has seemed to make patient confused and he has began to take off heart leads. Precedex restarted.      Electronically signed by Henrik Romano RN on 12/29/2021 at 4:25 AM

## 2021-12-29 NOTE — PROGRESS NOTES
Procedure Component Value Units Date/Time     Blood Gas, Arterial [[id number]] (Abnormal) Collected: 12/29/21 0750     Specimen: Blood gases Updated: 12/29/21 0752      pH, Arterial 7.420      pCO2, Arterial 20.0 mmHg       pO2, Arterial 59.0 mmHg       HCO3, Arterial 13.0 mmol/L       Base Excess, Arterial -8.7 mmol/L       Hemoglobin, Art, Extended 15.7 g/dL       O2 Sat, Arterial 93.2 %       Carboxyhgb, Arterial 2.0 %       Methemoglobin, Arterial 1.2 %       O2 Content, Arterial 20.5 mL/dL       O2 Therapy Unknown     Narrative:       CALL  Cheung [place]unsee Cooks RN, 12/29/2021 07:51, by Constanza Sherman     Potassium, Whole Blood [[id number]] Collected: 12/29/21 0750      Updated: 12/29/21 0752      Potassium, Whole Blood 3.3     Narrative:       CALL  Cheung [place] Cooks RN, 12/29/2021 07:51, by JAE     2 L/M RR36 RR RANDI PALAFOX

## 2021-12-29 NOTE — CONSULTS
[facility] Vascular Access Team:  Consult Note    Stephen Contreras  Admitted - 12/27/2021 12:27 AM  Admission Diagnosis -   DKA, type 1, not at goal [facility]) [E10.10]  Diabetic ketoacidosis without coma associated with other specified diabetes mellitus ([facility] [address] [E13.10]  COVID-19 virus infection [U07.1]    Attending Physician - Lisa Keene MD  Ordering Physician - Lisa Keene MD    Active LDAs -   PICC Triple Lumen 69/44/26 Right Cephalic (Active)   Number of days: 0       Peripheral IV 12/29/21 Distal;Right; Anterior Forearm (Active)   Number of days: 0       Reason for consult:  [facility] VAT team consulted for placement of PICC/Midline. Indications:    Patient removed previous double lumen PICC from right shoulder. Per nursing, patient had an episode of confusion and agitation this morning - pulled all lines out. Patient remains on an insulin drip and requires multiple IV infusions, frequent blood draws. Findings:     History reviewed. No pertinent past medical history. Recent Labs     Units 12/29/21  0305 12/27/21  1409 12/27/21  1146   BC   --   --  No Growth to date. Any change in status will be called. BLOODCULT2   --   --  No Growth to date. Any change in status will be called. WBC K/uL 12.8*   < >  --    PLT K/uL 143   < >  --    CREATININE mg/dL 1.0   < >  --     < > = values in this interval not displayed. 6 Swedish Triple Lumen PICC placed in right cephalic vein with one attempt. See procedural note. Recommendations:  PICC line is ready to be used    Thank you for your time and consult to the [facility] VAT.      Electronically Signed By: Latisha Langley RN on 12/29/2021 at 4:40 PM

## 2021-12-29 NOTE — CARE COORDINATION
Initial CM Assessment    Initial Assessment Completed at bedside with:    []   Patient  [x]   Family/Caregiver/Guardian - parent, Deanna Finnegan   []   Other:      Patient Contact Information:  [address] [facility] [address] [facility] [address] [phone] (home)   Above information verified? [x]   Yes  []   No    ADLS:   Patient lives at home with his girlfriend. Prior to admission, he was independent with ADLs. Support System:    Parents. His father states he is available 24 hrs/day to care for patient, if needed. Plan to return to current housing:   [x]   Yes, If he does not return home, he will stay with his Father  []   No     D/C Plan if not returning home: Father's home    Transportation plan for Discharge:  Father    Do you have any unmet social needs that would keep you from returning home safely:  []   Yes  [x]   No              Unmet Social Needs Notes:       Had [address] prior to admission:    []   Yes  [x]   No    Oxygen Company:   Patient will use [employer], if home oxygen is needed. Has a pulse oximetry unit at home:   []   Yes  [x]   No - [facility]. Currently ACTIVE with Home Health CARE:    []   Yes  [x]   No  []   Interested at discharge  [address] [facility]:      Current PCP:  Patient does not have a PCP. Patient's father plans to set him up with Dr. Sree Tapia office. He plans to contact DELMA Haywood. No primary care provider on file. PCP verified? [x]   Yes  []   No    Have you been vaccinated for COVID-19 (SARS-CoV-2)? []   Yes  [x]   No                   If so, when? Which :  []   [employer]  []   Reyes Meiers  []   Oksana Kowalski  []   Other:       Pharmacy:    [facility] [address] [place] [address] [phone] [address] [facility] [address]  Phone: [phone] Fax: [phone]    Prefer to use Meds to Bed? [x]   Yes  []   No  Potential assistance purchasing medications? [x]   Yes  []   No    Active with HD/PD prior to admission:           []   Yes  [x]   No  HD Center:       Financial:  Payor: /     Pre-Cert required for SNF:   []   Yes  [x]   No    Patient Deficits:  []   Yes   [x]   No    If yes:  []   Confusion/Memory  []   Visual  []   Motor/Sensory         []   Right arm         []   Right leg         []   Left arm         []   Left leg  []   Language/Speech         []   Aphasia         []   Dysarthria         []   Swallow         [place] Coma Scale  Eye Opening: To speech  Best Verbal Response: Confused  Best Motor Response: Obeys commands  [place] Coma Scale Score: 13    Patient Deficit Notes: Additional CM/SW Notes:   Patient's father, Sidney Smith, reports that 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 He does not believe his son drinks excessively, \"he may have 2-3 beers at the bar when he is playing with his band\".     Radha Katz and/or his family were provided with choice of provider:  [x]   Yes   []   No      Patient Admission Status:   Inpatient [101]    Anatoly Solorzano RN  [address]  Electronically signed by Anatoly Solorzano RN on 12/29/2021 at 12:25 PM

## 2021-12-29 NOTE — PROGRESS NOTES
Hospitalist Progress Note  [facility]     Patient: Cristiana Mendoza  : 1975  MRN: [id number]  Code Status: Full Code    Hospital Day: 2   Date of Service: 2021    Subjective:   Patient seen in [place] [address] critical care unit. Significant agitation requiring Precedex gtt. History reviewed. No pertinent past medical history. History reviewed. No pertinent surgical history. History reviewed. No pertinent family history. Social History     Socioeconomic History    Marital status: Single     Spouse name: Not on file    Number of children: Not on file    Years of education: Not on file    Highest education level: Not on file   Occupational History    Not on file   Tobacco Use    Smoking status: Never Smoker    Smokeless tobacco: Never Used   Substance and Sexual Activity    Alcohol use: Yes    Drug use: Not Currently    Sexual activity: Not on file   Other Topics Concern    Not on file   Social History Narrative    Not on file     Social Determinants of Health     Financial Resource Strain:     Difficulty of Paying Living Expenses: Not on file   Food Insecurity:     Worried About Running Out of Food in the Last Year: Not on file    Emy of Food in the Last Year: Not on file   Transportation Needs:     Lack of Transportation (Medical): Not on file    Lack of Transportation (Non-Medical):  Not on file   Physical Activity:     Days of Exercise per Week: Not on file    Minutes of Exercise per Session: Not on file   Stress:     Feeling of Stress : Not on file   Social Connections:     Frequency of Communication with Friends and Family: Not on file    Frequency of Social Gatherings with Friends and Family: Not on file    Attends Mu-ism Services: Not on file    Active Member of Clubs or Organizations: Not on file    Attends Club or Organization Meetings: Not on file    Marital Status: Not on file   Intimate Partner Violence:     Fear of Current or Ex-Partner: Not on file    Emotionally Abused: Not on file    Physically Abused: Not on file    Sexually Abused: Not on file   Housing Stability:     Unable to Pay for Housing in the Last Year: Not on file    Number of Places Lived in the Last Year: Not on file    Unstable Housing in the Last Year: Not on file       Current Facility-Administered Medications   Medication Dose Route Frequency Provider Last Rate Last Admin    insulin glargine (LANTUS) injection vial 15 Units  15 Units SubCUTAneous Nightly Georgia Wahl MD   15 Units at 12/29/21 0529    insulin lispro (HUMALOG) injection vial 0-12 Units  0-12 Units SubCUTAneous Q4H Michael York MD        lidocaine PF 1 % injection 5 mL  5 mL IntraDERmal Once Georgia Wahl MD        0.9 % sodium chloride infusion  25 mL IntraVENous PRN Georgia Wahl MD        thiamine (B-1) injection 100 mg  100 mg IntraVENous Daily Lunette Litten, DO   100 mg at 12/29/21 0831    glucose (GLUTOSE) 40 % oral gel 15 g  15 g Oral PRN Lunette Litten, DO        dextrose 50 % IV solution  12.5 g IntraVENous PRN Lunette Litten, DO        glucagon (rDNA) injection 1 mg  1 mg IntraMUSCular PRN Lunette Litten, DO        dextrose 5 % solution  100 mL/hr IntraVENous PRN Lunette Litten, DO        dexmedetomidine (PRECEDEX) 400 mcg in sodium chloride 0.9 % 100 mL infusion  0.2-1.4 mcg/kg/hr IntraVENous Continuous Lunette Litten, DO 7.7 mL/hr at 12/29/21 1130 0.4 mcg/kg/hr at 94/06/74 4271    folic acid (FOLVITE) tablet 1 mg  1 mg Oral Daily Georgia Wahl MD        sodium chloride flush 0.9 % injection 5-40 mL  5-40 mL IntraVENous 2 times per day Georgia Wahl MD   10 mL at 12/29/21 0314    0.9 % sodium chloride infusion  25 mL IntraVENous PRN Georgia Wahl MD        acetaminophen (TYLENOL) tablet 650 mg  650 mg Oral Q6H PRN Georgia Wahl MD        Or    acetaminophen (TYLENOL) suppository 650 mg 650 mg Rectal Q6H PRN Melony Osorio MD        potassium chloride 10 mEq/100 mL IVPB (Peripheral Line)  10 mEq IntraVENous PRN Melony Osorio MD   Stopped at 12/29/21 0257    magnesium sulfate 1000 mg in dextrose 5% 100 mL IVPB  1,000 mg IntraVENous PRN Melony Osorio MD        sodium phosphate 10 mmol in dextrose 5 % 250 mL IVPB  10 mmol IntraVENous PRN Melony Osorio MD        Or    sodium phosphate 15 mmol in dextrose 5 % 250 mL IVPB  15 mmol IntraVENous PRN Melony Osorio MD        Or    sodium phosphate 20 mmol in dextrose 5 % 500 mL IVPB  20 mmol IntraVENous PRN Melony Osorio MD   Stopped at 12/29/21 0432    0.9 % sodium chloride infusion   IntraVENous Continuous Melony Osorio MD   Stopped at 12/27/21 2112    dextrose 5 % and 0.45 % NaCl with KCl 20 mEq infusion   IntraVENous Continuous PRN Melony Osorio MD   Stopped at 12/28/21 2224    enoxaparin (LOVENOX) injection 30 mg  30 mg SubCUTAneous BID Melony Osorio MD   30 mg at 12/29/21 0831    azithromycin (ZITHROMAX) 250 mg in dextrose 5 % 250 mL IVPB  250 mg IntraVENous Q24H Melony Osorio MD   Stopped at 12/28/21 1732    sodium chloride flush 0.9 % injection 5-40 mL  5-40 mL IntraVENous PRN Melony Osorio MD        ondansetron (ZOFRAN-ODT) disintegrating tablet 4 mg  4 mg Oral Q8H PRN Melony Osorio MD        Or    ondansetron [facility]) injection 4 mg  4 mg IntraVENous Q6H PRN Melony Osorio MD        polyethylene glycol [facility]) packet 17 g  17 g Oral Daily PRN Melony Osorio MD             sodium chloride      dextrose      dexmedetomidine HCl in NaCl 0.4 mcg/kg/hr (12/29/21 1130)    sodium chloride      sodium chloride Stopped (12/27/21 2112)    dextrose 5% and 0.45% NaCl with KCl 20 mEq Stopped (12/28/21 2224)        Objective:   /83   Pulse 124   Temp 97.3 °F (36.3 °C)   Resp 28   Ht 5' 9\" (1.753 m)   Wt 170 lb (77.1 kg)   SpO2 93%   BMI 25.10 kg/m²     In an effort to reduce COVID-19 exposure, patient seen from doorway and case discussed in detail with unit staff    Recent Labs     12/27/21  0038 12/28/21  0629 12/29/21  0305   WBC 16.1* 16.7* 12.8*   RBC 6.10 5.33 4.70   HGB 18.2* 16.0 14.0   HCT 54.8* 48.2 40.2*   MCV 89.8 90.4 85.5   MCH 29.8 30.0 29.8   MCHC 33.2 33.2 34.8    147 143     Recent Labs     12/28/21  1716 12/28/21  1716 12/28/21 2150 12/29/21  0305 12/29/21  0750     --  145 141  --    K 2.9*   < > 3.0* 3.7 3.3   ANIONGAP 9  --  9 16  --    *  --  119* 113*  --    CO2 18*  --  17* 12*  --    BUN 15  --  15 18  --    CREATININE 1.0  --  1.0 1.0  --    GLUCOSE 303*  --  166* 373*  --    CALCIUM 7.8*  --  7.9* 7.8*  --     < > = values in this interval not displayed. Recent Labs     12/28/21 1716 12/28/21 2150 12/29/21  0305   MG 1.9 1.9 1.9   PHOS 0.6* 0.4* 1.3*     Recent Labs     12/27/21  0038 12/27/21  1409   AST 22 32   ALT 36 33   BILITOT <0.2 <0.2   ALKPHOS 131* 120     No results for input(s): PH, PO2, PCO2, HCO3, BE, O2SAT in the last 72 hours. Recent Labs     12/27/21  1409 12/28/21  0012   TROPONINI <0.01 <0.01     Recent Labs     12/28/21  0629   INR 2.86*     Recent Labs     12/27/21  1146   LACTA 1.3         Intake/Output Summary (Last 24 hours) at 12/29/2021 1511  Last data filed at 12/29/2021 1130  Gross per 24 hour   Intake 3066.48 ml   Output 1600 ml   Net 1466.48 ml       No results found.      Assessment and Plan:   DKA  Insulin gtt  IVF  BMP/mag/Phos every 4 hours  Accu-Cheks every hour  Follow anion gap metabolic acidosis to correction    Concern for alcohol withdrawal  CIWA protocol  Requiring Precedex gtt  Daily thiamine/folic acid/MVI  IVF  Follow electrolytes    Social work    COVID-19 positivity  Asymptomatic from a pulmonary standpoint  Room air  S/p Regeneron  Supportive care    DVT prophylaxis  Lovenox    Total critical care time: 46 minutes    Get Bain MD   12/29/2021 3:11 PM

## 2021-12-29 NOTE — PROCEDURES
[facility] Vascular Access Team:  PICC Line Insertion Procedure Note    Little Sher  Admitted - 12/27/2021 12:27 AM  Admission Diagnosis -   DKA, type 1, not at goal [facility]) [E10.10]  Diabetic ketoacidosis without coma associated with other specified diabetes mellitus ([facility] [address] [E13.10]  COVID-19 virus infection [U07.1]    Attending Physician - Cecilia Kuo MD  Ordering Physician - Cecilia Kuo MD    Active LDAs -   Peripheral IV 12/29/21 Distal;Right; Anterior Forearm (Active)   Number of days: 0       Procedure: Insertion of 6 Moroccan Triple Lumen PICC    Indications:    Long Term IV therapy, Poor Access, Critical Gtts    Procedure Details:  Informed consent was obtained for the procedure, including sedation. Risks of lung perforation, hemorrhage, and adverse drug reaction were discussed. 6 Moroccan Triple Lumen PICC inserted to the Right Cephalic per hospital protocol. Blood return: yes    Findings: The Right Cephalic vessel was visualized using the ultrasound and deemed a suitable vessel. The area was prepped with Chlorhexidine and draped in sterile fashion using full max barrier draping. The area was anesthetized with 3 cc's of 1% Lidocaine. The vessel was accessed using US guidance. The guidewire was advanced through the needle to secure the vessel. The needle was removed and a peel-a-way Sheath was placed over the guidewire. Guidewire was removed with tip intact. The 6 Moroccan Triple Lumen PICC was trimmed at 31 cm and inserted into the Sheath. Using VPS technology, the PICC line was advanced until PICC tip was in the SVC. The peel-a-way sheath was removed and PICC was inserted until the CAJ was reached. Unable to obtain optimal P wave amplitude or P wave deflection on VPS due to loss of stylet tracking- stylet removed and tip intact. Chest XR for confirmation of placement will be ordered. Left approximately 0 cm exposed.  Internal components of the PICC were removed, all lumens had brisk blood return and was flushed with 10 cc of NS. The PICC was secured using a securement device and a Biopatch was placed over the insertion site. A Tegaderm was placed over the securement device and insertion site. Dressing was dated and initialed with external measurement marked. Patient did tolerate procedure well. Recommendations:  CXR for confirmation of placement    PICC Line is ready to be used. Please change tubing prior to using new PICC line. Make sure to label, date and use alcohol caps on new tubing and alcohol caps on unused ports. Narrative   EXAM: XR CHEST PORTABLE   INDICATION: PICC placement   COMPARISON: 12/27/2021   FINDINGS:   Cardiac silhouette is normal size. No pleural effusion or visible   pneumothorax. No significant change in bilateral airspace opacities. Right-sided PICC with tip overlying the SVC. Old bilateral rib   fractures.       Impression   1.  Right-sided PICC with tip overlying the SVC.    2.  No significant change in bilateral airspace opacities appear   Signed by Dr Benedict Cerda           Electronically Signed By: Andressa Mcelroy RN on 12/29/2021 at 3:59 PM

## 2021-12-29 NOTE — PROGRESS NOTES
Anion gap closed for past three checks. Blood sugar is 121. Sliding scale started and insulin gtt off. Full liquid diet started. Patient tolerating diet well.      Electronically signed by Diamond Blum RN on 12/28/2021 at 11:25 PM

## 2021-12-30 LAB
ANION GAP SERPL CALCULATED.3IONS-SCNC: 10 MMOL/L (ref 7–19)
ANION GAP SERPL CALCULATED.3IONS-SCNC: 12 MMOL/L (ref 7–19)
ANION GAP SERPL CALCULATED.3IONS-SCNC: 12 MMOL/L (ref 7–19)
ANION GAP SERPL CALCULATED.3IONS-SCNC: 9 MMOL/L (ref 7–19)
ATYPICAL LYMPHOCYTE RELATIVE PERCENT: 1 % (ref 0–8)
BASOPHILS ABSOLUTE: 0 K/UL (ref 0–0.2)
BASOPHILS RELATIVE PERCENT: 0 % (ref 0–1)
BUN BLDV-MCNC: 14 MG/DL (ref 6–20)
BUN BLDV-MCNC: 17 MG/DL (ref 6–20)
BUN BLDV-MCNC: 19 MG/DL (ref 6–20)
BUN BLDV-MCNC: 20 MG/DL (ref 6–20)
CALCIUM SERPL-MCNC: 7.1 MG/DL (ref 8.6–10)
CALCIUM SERPL-MCNC: 7.8 MG/DL (ref 8.6–10)
CALCIUM SERPL-MCNC: 7.8 MG/DL (ref 8.6–10)
CALCIUM SERPL-MCNC: 7.9 MG/DL (ref 8.6–10)
CHLORIDE BLD-SCNC: 112 MMOL/L (ref 98–111)
CHLORIDE BLD-SCNC: 115 MMOL/L (ref 98–111)
CHLORIDE BLD-SCNC: 115 MMOL/L (ref 98–111)
CHLORIDE BLD-SCNC: 116 MMOL/L (ref 98–111)
CO2: 18 MMOL/L (ref 22–29)
CO2: 19 MMOL/L (ref 22–29)
CO2: 19 MMOL/L (ref 22–29)
CO2: 20 MMOL/L (ref 22–29)
CREAT SERPL-MCNC: 0.8 MG/DL (ref 0.5–1.2)
CREAT SERPL-MCNC: 0.8 MG/DL (ref 0.5–1.2)
CREAT SERPL-MCNC: 0.9 MG/DL (ref 0.5–1.2)
CREAT SERPL-MCNC: 1 MG/DL (ref 0.5–1.2)
EOSINOPHILS ABSOLUTE: 0 K/UL (ref 0–0.6)
EOSINOPHILS RELATIVE PERCENT: 0 % (ref 0–5)
GFR AFRICAN AMERICAN: >59
GFR NON-AFRICAN AMERICAN: >60
GLUCOSE BLD-MCNC: 212 MG/DL (ref 70–99)
GLUCOSE BLD-MCNC: 216 MG/DL (ref 74–109)
GLUCOSE BLD-MCNC: 224 MG/DL (ref 74–109)
GLUCOSE BLD-MCNC: 261 MG/DL (ref 70–99)
GLUCOSE BLD-MCNC: 262 MG/DL (ref 70–99)
GLUCOSE BLD-MCNC: 264 MG/DL (ref 70–99)
GLUCOSE BLD-MCNC: 281 MG/DL (ref 70–99)
GLUCOSE BLD-MCNC: 315 MG/DL (ref 74–109)
GLUCOSE BLD-MCNC: 318 MG/DL (ref 74–109)
GLUCOSE BLD-MCNC: 328 MG/DL (ref 70–99)
GLUCOSE BLD-MCNC: 336 MG/DL (ref 70–99)
GLUCOSE BLD-MCNC: 367 MG/DL (ref 70–99)
HCT VFR BLD CALC: 36.9 % (ref 42–52)
HEMOGLOBIN: 13.2 G/DL (ref 14–18)
IMMATURE GRANULOCYTES #: 0.1 K/UL
LYMPHOCYTES ABSOLUTE: 1.9 K/UL (ref 1.1–4.5)
LYMPHOCYTES RELATIVE PERCENT: 20 % (ref 20–40)
MAGNESIUM: 1.5 MG/DL (ref 1.6–2.6)
MAGNESIUM: 1.6 MG/DL (ref 1.6–2.6)
MAGNESIUM: 1.9 MG/DL (ref 1.6–2.6)
MAGNESIUM: 2 MG/DL (ref 1.6–2.6)
MCH RBC QN AUTO: 30.3 PG (ref 27–31)
MCHC RBC AUTO-ENTMCNC: 35.8 G/DL (ref 33–37)
MCV RBC AUTO: 84.6 FL (ref 80–94)
MONOCYTES ABSOLUTE: 0.5 K/UL (ref 0–0.9)
MONOCYTES RELATIVE PERCENT: 5 % (ref 0–10)
NEUTROPHILS ABSOLUTE: 6.7 K/UL (ref 1.5–7.5)
NEUTROPHILS RELATIVE PERCENT: 74 % (ref 50–65)
PDW BLD-RTO: 13.3 % (ref 11.5–14.5)
PERFORMED ON: ABNORMAL
PHOSPHORUS: 1 MG/DL (ref 2.5–4.5)
PHOSPHORUS: 1.9 MG/DL (ref 2.5–4.5)
PLATELET # BLD: 144 K/UL (ref 130–400)
PLATELET SLIDE REVIEW: ADEQUATE
PMV BLD AUTO: 11.1 FL (ref 9.4–12.4)
POTASSIUM SERPL-SCNC: 3.2 MMOL/L (ref 3.5–5)
POTASSIUM SERPL-SCNC: 3.2 MMOL/L (ref 3.5–5)
POTASSIUM SERPL-SCNC: 3.5 MMOL/L (ref 3.5–5)
POTASSIUM SERPL-SCNC: 3.8 MMOL/L (ref 3.5–5)
PRO-BNP: 1164 PG/ML (ref 0–450)
RBC # BLD: 4.36 M/UL (ref 4.7–6.1)
SODIUM BLD-SCNC: 142 MMOL/L (ref 136–145)
SODIUM BLD-SCNC: 144 MMOL/L (ref 136–145)
SODIUM BLD-SCNC: 145 MMOL/L (ref 136–145)
SODIUM BLD-SCNC: 146 MMOL/L (ref 136–145)
WBC # BLD: 9.1 K/UL (ref 4.8–10.8)

## 2021-12-30 PROCEDURE — 86738 MYCOPLASMA ANTIBODY: CPT

## 2021-12-30 PROCEDURE — 6360000002 HC RX W HCPCS: Performed by: HOSPITALIST

## 2021-12-30 PROCEDURE — 2500000003 HC RX 250 WO HCPCS: Performed by: INTERNAL MEDICINE

## 2021-12-30 PROCEDURE — 85025 COMPLETE CBC W/AUTO DIFF WBC: CPT

## 2021-12-30 PROCEDURE — 6370000000 HC RX 637 (ALT 250 FOR IP): Performed by: INTERNAL MEDICINE

## 2021-12-30 PROCEDURE — 83880 ASSAY OF NATRIURETIC PEPTIDE: CPT

## 2021-12-30 PROCEDURE — 84100 ASSAY OF PHOSPHORUS: CPT

## 2021-12-30 PROCEDURE — 80048 BASIC METABOLIC PNL TOTAL CA: CPT

## 2021-12-30 PROCEDURE — 6370000000 HC RX 637 (ALT 250 FOR IP): Performed by: HOSPITALIST

## 2021-12-30 PROCEDURE — 2580000003 HC RX 258: Performed by: INTERNAL MEDICINE

## 2021-12-30 PROCEDURE — 36592 COLLECT BLOOD FROM PICC: CPT

## 2021-12-30 PROCEDURE — 2100000000 HC CCU R&B

## 2021-12-30 PROCEDURE — XW0DXM6 INTRODUCTION OF BARICITINIB INTO MOUTH AND PHARYNX, EXTERNAL APPROACH, NEW TECHNOLOGY GROUP 6: ICD-10-PCS | Performed by: HOSPITALIST

## 2021-12-30 PROCEDURE — 82947 ASSAY GLUCOSE BLOOD QUANT: CPT

## 2021-12-30 PROCEDURE — 6360000002 HC RX W HCPCS: Performed by: INTERNAL MEDICINE

## 2021-12-30 PROCEDURE — 83735 ASSAY OF MAGNESIUM: CPT

## 2021-12-30 PROCEDURE — 2580000003 HC RX 258: Performed by: HOSPITALIST

## 2021-12-30 PROCEDURE — 2580000003 HC RX 258

## 2021-12-30 RX ORDER — POTASSIUM CHLORIDE 29.8 MG/ML
20 INJECTION INTRAVENOUS PRN
Status: DISCONTINUED | OUTPATIENT
Start: 2021-12-30 | End: 2022-01-03 | Stop reason: HOSPADM

## 2021-12-30 RX ORDER — VITAMIN B COMPLEX
2000 TABLET ORAL DAILY
Status: DISCONTINUED | OUTPATIENT
Start: 2021-12-30 | End: 2022-01-03 | Stop reason: HOSPADM

## 2021-12-30 RX ORDER — INSULIN GLARGINE 100 [IU]/ML
15 INJECTION, SOLUTION SUBCUTANEOUS NIGHTLY
Status: DISCONTINUED | OUTPATIENT
Start: 2021-12-30 | End: 2022-01-03 | Stop reason: HOSPADM

## 2021-12-30 RX ORDER — DEXAMETHASONE SODIUM PHOSPHATE 10 MG/ML
6 INJECTION, SOLUTION INTRAMUSCULAR; INTRAVENOUS EVERY 24 HOURS
Status: DISCONTINUED | OUTPATIENT
Start: 2021-12-30 | End: 2021-12-31

## 2021-12-30 RX ORDER — ACETAMINOPHEN 325 MG/1
650 TABLET ORAL EVERY 6 HOURS PRN
Status: DISCONTINUED | OUTPATIENT
Start: 2021-12-30 | End: 2022-01-03 | Stop reason: HOSPADM

## 2021-12-30 RX ORDER — ZINC SULFATE 50(220)MG
50 CAPSULE ORAL DAILY
Status: DISCONTINUED | OUTPATIENT
Start: 2021-12-30 | End: 2022-01-03 | Stop reason: HOSPADM

## 2021-12-30 RX ORDER — INSULIN GLARGINE 100 [IU]/ML
15 INJECTION, SOLUTION SUBCUTANEOUS 2 TIMES DAILY
Status: DISCONTINUED | OUTPATIENT
Start: 2021-12-30 | End: 2021-12-30

## 2021-12-30 RX ORDER — MECOBALAMIN 5000 MCG
10 TABLET,DISINTEGRATING ORAL NIGHTLY
Status: DISCONTINUED | OUTPATIENT
Start: 2021-12-30 | End: 2022-01-03 | Stop reason: HOSPADM

## 2021-12-30 RX ORDER — DEXAMETHASONE SODIUM PHOSPHATE 10 MG/ML
6 INJECTION, SOLUTION INTRAMUSCULAR; INTRAVENOUS EVERY 12 HOURS
Status: DISCONTINUED | OUTPATIENT
Start: 2021-12-30 | End: 2021-12-30

## 2021-12-30 RX ORDER — GUAIFENESIN 200 MG/1
400 TABLET ORAL EVERY 8 HOURS
Status: DISCONTINUED | OUTPATIENT
Start: 2021-12-30 | End: 2022-01-03 | Stop reason: HOSPADM

## 2021-12-30 RX ORDER — SODIUM CHLORIDE, SODIUM LACTATE, POTASSIUM CHLORIDE, CALCIUM CHLORIDE 600; 310; 30; 20 MG/100ML; MG/100ML; MG/100ML; MG/100ML
INJECTION, SOLUTION INTRAVENOUS CONTINUOUS
Status: DISCONTINUED | OUTPATIENT
Start: 2021-12-30 | End: 2021-12-31

## 2021-12-30 RX ORDER — CODEINE PHOSPHATE AND GUAIFENESIN 10; 100 MG/5ML; MG/5ML
3 SOLUTION ORAL EVERY 4 HOURS PRN
Status: DISCONTINUED | OUTPATIENT
Start: 2021-12-30 | End: 2022-01-03 | Stop reason: HOSPADM

## 2021-12-30 RX ADMIN — INSULIN LISPRO 6 UNITS: 100 INJECTION, SOLUTION INTRAVENOUS; SUBCUTANEOUS at 08:05

## 2021-12-30 RX ADMIN — ACETAMINOPHEN 650 MG: 325 TABLET ORAL at 12:17

## 2021-12-30 RX ADMIN — INSULIN LISPRO 6 UNITS: 100 INJECTION, SOLUTION INTRAVENOUS; SUBCUTANEOUS at 11:36

## 2021-12-30 RX ADMIN — INSULIN LISPRO 2 UNITS: 100 INJECTION, SOLUTION INTRAVENOUS; SUBCUTANEOUS at 17:07

## 2021-12-30 RX ADMIN — THIAMINE HYDROCHLORIDE 100 MG: 100 INJECTION, SOLUTION INTRAMUSCULAR; INTRAVENOUS at 08:06

## 2021-12-30 RX ADMIN — POTASSIUM CHLORIDE 20 MEQ: 29.8 INJECTION INTRAVENOUS at 11:28

## 2021-12-30 RX ADMIN — SODIUM PHOSPHATE, MONOBASIC, MONOHYDRATE 15 MMOL: 276; 142 INJECTION, SOLUTION INTRAVENOUS at 16:04

## 2021-12-30 RX ADMIN — SODIUM CHLORIDE 25 ML: 9 INJECTION, SOLUTION INTRAVENOUS at 16:03

## 2021-12-30 RX ADMIN — FOLIC ACID 1 MG: 1 TABLET ORAL at 08:06

## 2021-12-30 RX ADMIN — GUAIFENESIN 400 MG: 200 TABLET ORAL at 22:00

## 2021-12-30 RX ADMIN — ZINC SULFATE 220 MG (50 MG) CAPSULE 50 MG: CAPSULE at 17:06

## 2021-12-30 RX ADMIN — MAGNESIUM SULFATE HEPTAHYDRATE 1000 MG: 1 INJECTION, SOLUTION INTRAVENOUS at 11:30

## 2021-12-30 RX ADMIN — INSULIN LISPRO 8 UNITS: 100 INJECTION, SOLUTION INTRAVENOUS; SUBCUTANEOUS at 17:06

## 2021-12-30 RX ADMIN — ALTEPLASE 2 MG: 2.2 INJECTION, POWDER, LYOPHILIZED, FOR SOLUTION INTRAVENOUS at 11:19

## 2021-12-30 RX ADMIN — Medication 10 MG: at 20:22

## 2021-12-30 RX ADMIN — INSULIN GLARGINE 15 UNITS: 100 INJECTION, SOLUTION SUBCUTANEOUS at 04:11

## 2021-12-30 RX ADMIN — FAMOTIDINE 20 MG: 10 INJECTION, SOLUTION INTRAVENOUS at 16:00

## 2021-12-30 RX ADMIN — POTASSIUM CHLORIDE 20 MEQ: 29.8 INJECTION INTRAVENOUS at 01:42

## 2021-12-30 RX ADMIN — DEXAMETHASONE SODIUM PHOSPHATE 6 MG: 10 INJECTION INTRAMUSCULAR; INTRAVENOUS at 15:59

## 2021-12-30 RX ADMIN — GUAIFENESIN 400 MG: 200 TABLET ORAL at 16:01

## 2021-12-30 RX ADMIN — POTASSIUM CHLORIDE 20 MEQ: 29.8 INJECTION INTRAVENOUS at 02:46

## 2021-12-30 RX ADMIN — AZITHROMYCIN MONOHYDRATE 250 MG: 500 INJECTION, POWDER, LYOPHILIZED, FOR SOLUTION INTRAVENOUS at 16:04

## 2021-12-30 RX ADMIN — SODIUM CHLORIDE, POTASSIUM CHLORIDE, SODIUM LACTATE AND CALCIUM CHLORIDE: 600; 310; 30; 20 INJECTION, SOLUTION INTRAVENOUS at 16:01

## 2021-12-30 RX ADMIN — THERA TABS 1 TABLET: TAB at 08:06

## 2021-12-30 RX ADMIN — POTASSIUM CHLORIDE 20 MEQ: 29.8 INJECTION INTRAVENOUS at 13:24

## 2021-12-30 RX ADMIN — Medication 2000 UNITS: at 16:01

## 2021-12-30 RX ADMIN — INSULIN LISPRO 5 UNITS: 100 INJECTION, SOLUTION INTRAVENOUS; SUBCUTANEOUS at 20:24

## 2021-12-30 RX ADMIN — FAMOTIDINE 20 MG: 10 INJECTION, SOLUTION INTRAVENOUS at 20:22

## 2021-12-30 RX ADMIN — GUAIFENESIN AND CODEINE PHOSPHATE 3 ML: 10; 100 LIQUID ORAL at 05:34

## 2021-12-30 RX ADMIN — ONDANSETRON 4 MG: 4 TABLET, ORALLY DISINTEGRATING ORAL at 17:14

## 2021-12-30 RX ADMIN — WATER: 1 INJECTION INTRAMUSCULAR; INTRAVENOUS; SUBCUTANEOUS at 14:50

## 2021-12-30 RX ADMIN — SODIUM CHLORIDE, PRESERVATIVE FREE 10 ML: 5 INJECTION INTRAVENOUS at 20:25

## 2021-12-30 RX ADMIN — ENOXAPARIN SODIUM 40 MG: 100 INJECTION SUBCUTANEOUS at 08:06

## 2021-12-30 RX ADMIN — BARICITINIB 4 MG: 2 TABLET, FILM COATED ORAL at 16:01

## 2021-12-30 RX ADMIN — MAGNESIUM SULFATE HEPTAHYDRATE 1000 MG: 1 INJECTION, SOLUTION INTRAVENOUS at 13:14

## 2021-12-30 RX ADMIN — ACETAMINOPHEN 650 MG: 325 TABLET ORAL at 20:47

## 2021-12-30 RX ADMIN — Medication 10 ML: at 20:26

## 2021-12-30 RX ADMIN — INSULIN GLARGINE 15 UNITS: 100 INJECTION, SOLUTION SUBCUTANEOUS at 20:22

## 2021-12-30 ASSESSMENT — PAIN DESCRIPTION - DESCRIPTORS: DESCRIPTORS: HEADACHE

## 2021-12-30 ASSESSMENT — PAIN SCALES - GENERAL
PAINLEVEL_OUTOF10: 0
PAINLEVEL_OUTOF10: 0
PAINLEVEL_OUTOF10: 5
PAINLEVEL_OUTOF10: 0

## 2021-12-30 ASSESSMENT — PAIN DESCRIPTION - PAIN TYPE: TYPE: ACUTE PAIN

## 2021-12-30 ASSESSMENT — PAIN DESCRIPTION - LOCATION: LOCATION: HEAD

## 2021-12-30 NOTE — PLAN OF CARE
Problem: Airway Clearance - Ineffective  Goal: Achieve or maintain patent airway  Outcome: Ongoing     Problem: Airway Clearance - Ineffective  Goal: Achieve or maintain patent airway  Outcome: Ongoing     Problem: Gas Exchange - Impaired  Goal: Absence of hypoxia  Outcome: Ongoing  Goal: Promote optimal lung function  Outcome: Ongoing     Problem: Breathing Pattern - Ineffective  Goal: Ability to achieve and maintain a regular respiratory rate  Outcome: Ongoing     Problem:  Body Temperature -  Risk of, Imbalanced  Goal: Ability to maintain a body temperature within defined limits  Outcome: Ongoing  Goal: Will regain or maintain usual level of consciousness  Outcome: Ongoing  Goal: Complications related to the disease process, condition or treatment will be avoided or minimized  Outcome: Ongoing     Problem: Isolation Precautions - Risk of Spread of Infection  Goal: Prevent transmission of infection  Outcome: Ongoing     Problem: Nutrition Deficits  Goal: Optimize nutritional status  Outcome: Ongoing     Problem: Risk for Fluid Volume Deficit  Goal: Maintain normal heart rhythm  Outcome: Ongoing  Goal: Maintain absence of muscle cramping  Outcome: Ongoing  Goal: Maintain normal serum potassium, sodium, calcium, phosphorus, and pH  Outcome: Ongoing     Problem: Loneliness or Risk for Loneliness  Goal: Demonstrate positive use of time alone when socialization is not possible  Outcome: Ongoing     Problem: Fatigue  Goal: Verbalize increase energy and improved vitality  Outcome: Ongoing     Problem: Patient Education: Go to Patient Education Activity  Goal: Patient/Family Education  Outcome: Ongoing     Problem: Non-Violent Restraints  Goal: Removal from restraints as soon as assessed to be safe  Outcome: Ongoing  Goal: No harm/injury to patient while restraints in use  Outcome: Ongoing  Goal: Patient's dignity will be maintained  Outcome: Ongoing     Problem: Falls - Risk of:  Goal: Will remain free from falls  Outcome: Ongoing  Goal: Absence of physical injury  Outcome: Ongoing

## 2021-12-30 NOTE — PROGRESS NOTES
Secure message sent to Dr. Aditi Santos to ask about stopping insulin drip since anion gap is closed and bicarb is >15 (on last 2 Bmp's). Physician advises that Dr. Asher Olvera will need to d/c insulin drip since he started it; however, that since pt's last potassium was 3.2, insulin drip should be stopped until potassium >4. Pt has been getting potassium replaced per DKA protocol (which includes replacements for K, Mag, and Sodium Phosphates). Insulin drip stopped per Dr. Hough, and potassium will be re-checked around 0500.

## 2021-12-30 NOTE — PROGRESS NOTES
Phosphorus, magnesium, and  Potassium replaced per protocol.    Electronically signed by Ravinder Degroot RN on 12/30/2021 at 4:47 PM

## 2021-12-30 NOTE — PROGRESS NOTES
Hospitalist Progress Note  [facility]     Patient: Queen Lina  : 1975  MRN: [id number]  Code Status: Full Code    Hospital Day: 3   Date of Service: 2021    Subjective:   Patient seen in [place] [id number] critical care unit. Agitation much improved. History reviewed. No pertinent past medical history. History reviewed. No pertinent surgical history. History reviewed. No pertinent family history. Social History     Socioeconomic History    Marital status: Single     Spouse name: Not on file    Number of children: Not on file    Years of education: Not on file    Highest education level: Not on file   Occupational History    Not on file   Tobacco Use    Smoking status: Never Smoker    Smokeless tobacco: Never Used   Substance and Sexual Activity    Alcohol use: Yes    Drug use: Not Currently    Sexual activity: Not on file   Other Topics Concern    Not on file   Social History Narrative    Not on file     Social Determinants of Health     Financial Resource Strain:     Difficulty of Paying Living Expenses: Not on file   Food Insecurity:     Worried About Running Out of Food in the Last Year: Not on file    Emy of Food in the Last Year: Not on file   Transportation Needs:     Lack of Transportation (Medical): Not on file    Lack of Transportation (Non-Medical):  Not on file   Physical Activity:     Days of Exercise per Week: Not on file    Minutes of Exercise per Session: Not on file   Stress:     Feeling of Stress : Not on file   Social Connections:     Frequency of Communication with Friends and Family: Not on file    Frequency of Social Gatherings with Friends and Family: Not on file    Attends Gnosticist Services: Not on file    Active Member of Clubs or Organizations: Not on file    Attends Club or Organization Meetings: Not on file    Marital Status: Not on file   Intimate Partner Violence:     Fear of Current or Ex-Partner: Not on file   [place] Emotionally Abused: Not on file    Physically Abused: Not on file    Sexually Abused: Not on file   Housing Stability:     Unable to Pay for Housing in the Last Year: Not on file    Number of Places Lived in the Last Year: Not on file    Unstable Housing in the Last Year: Not on file       Current Facility-Administered Medications   Medication Dose Route Frequency Provider Last Rate Last Admin    potassium chloride 20 mEq/50 mL IVPB (Central Line)  20 mEq IntraVENous PRN Rachel Noel MD 50 mL/hr at 12/30/21 1324 20 mEq at 12/30/21 1324    insulin lispro (HUMALOG) injection vial 0-12 Units  0-12 Units SubCUTAneous TID WC Rachel Noel MD   6 Units at 12/30/21 1136    insulin lispro (HUMALOG) injection vial 0-6 Units  0-6 Units SubCUTAneous Nightly Rachel Noel MD        insulin glargine (LANTUS) injection vial 15 Units  15 Units SubCUTAneous BID Rachel Noel MD   15 Units at 12/30/21 0411    guaiFENesin-codeine (GUAIFENESIN AC) 100-10 MG/5ML liquid 3 mL  3 mL Oral Q4H PRN Rachel Noel MD   3 mL at 12/30/21 0534    sterile water injection             dextrose 50 % IV solution  12.5 g IntraVENous PRN Hilary Guajardo MD        magnesium sulfate 1000 mg in dextrose 5% 100 mL IVPB  1,000 mg IntraVENous PRN Hilary Guajardo  mL/hr at 12/30/21 1314 1,000 mg at 12/30/21 1314    sodium phosphate 10 mmol in dextrose 5 % 250 mL IVPB  10 mmol IntraVENous PRN Hilary Guajardo MD        Or    sodium phosphate 15 mmol in dextrose 5 % 250 mL IVPB  15 mmol IntraVENous PRN Hilary Guajardo MD        Or    sodium phosphate 20 mmol in dextrose 5 % 500 mL IVPB  20 mmol IntraVENous PRN Hilary Guajardo MD   Stopped at 12/30/21 0556    dextrose 5 % and 0.45 % sodium chloride infusion   IntraVENous Continuous PRN Hilary Guajardo MD   Stopped at 12/30/21 9133    enoxaparin (LOVENOX) injection 40 mg  40 mg SubCUTAneous Daily Hilary Guajardo MD   40 mg at 12/30/21 1940    multivitamin 1 tablet  1 tablet Oral Daily Katey Sampson MD   1 tablet at 12/30/21 0794    lactated ringers infusion   IntraVENous Continuous Katey Sampson  mL/hr at 12/30/21 0400 Rate Verify at 12/30/21 0400    glucose (GLUTOSE) 40 % oral gel 15 g  15 g Oral PRN Katey Sampson MD        glucagon (rDNA) injection 1 mg  1 mg IntraMUSCular PRN Katey Sampson MD        dextrose 5 % solution  100 mL/hr IntraVENous PRN Katey Sampson MD        sodium chloride flush 0.9 % injection 5-40 mL  5-40 mL IntraVENous 2 times per day Katey Sampson MD   10 mL at 12/29/21 2000    sodium chloride flush 0.9 % injection 5-40 mL  5-40 mL IntraVENous PRN Katey Sampson MD        0.9 % sodium chloride infusion  25 mL IntraVENous PRN Katey Sampson MD        potassium chloride 20 mEq/50 mL IVPB (Central Line)  20 mEq IntraVENous PRN Cynede Mcmanus MD   Stopped at 12/30/21 0347    0.9 % sodium chloride infusion  25 mL IntraVENous PRN Cyndee Mcmanus MD        thiamine (B-1) injection 100 mg  100 mg IntraVENous Daily Polina Weiss, DO   100 mg at 12/30/21 2974    dexmedetomidine (PRECEDEX) 400 mcg in sodium chloride 0.9 % 100 mL infusion  0.2-1.4 mcg/kg/hr IntraVENous Continuous Polina Weiss DO   Stopped at 81/25/67 6574    folic acid (FOLVITE) tablet 1 mg  1 mg Oral Daily Cyndee Mcmanus MD   1 mg at 12/30/21 7077    sodium chloride flush 0.9 % injection 5-40 mL  5-40 mL IntraVENous 2 times per day Cyndee Mcmanus MD   10 mL at 12/29/21 0314    acetaminophen (TYLENOL) tablet 650 mg  650 mg Oral Q6H PRN Cyndee Mcmanus MD   650 mg at 12/30/21 1217    Or    acetaminophen (TYLENOL) suppository 650 mg  650 mg Rectal Q6H PRN Cyndee Mcmanus MD        azithromycin (ZITHROMAX) 250 mg in dextrose 5 % 250 mL IVPB  250 mg IntraVENous Q24H Cyndee Mcmanus MD   Stopped at 12/29/21 1817    sodium chloride flush 0.9 % injection 5-40 mL  5-40 mL IntraVENous PRN Courtney Evans MD        ondansetron (ZOFRAN-ODT) disintegrating tablet 4 mg  4 mg Oral Q8H PRN Courtney Evans MD        Or    ondansetron [facility]) injection 4 mg  4 mg IntraVENous Q6H PRN Courtney Evans MD        polyethylene glycol [facility]) packet 17 g  17 g Oral Daily PRN Courtney vEans MD             dextrose 5 % and 0.45 % NaCl Stopped (12/30/21 0333)    lactated ringers 100 mL/hr at 12/30/21 0400    dextrose      sodium chloride      sodium chloride      dexmedetomidine HCl in NaCl Stopped (12/30/21 0902)        Objective:   /89   Pulse 126   Temp 100.8 °F (38.2 °C) (Temporal)   Resp 30   Ht 5' 9\" (1.753 m)   Wt 170 lb (77.1 kg)   SpO2 92%   BMI 25.10 kg/m²     In an effort to reduce COVID-19 exposure, patient seen from doorway and case discussed in detail with unit staff    Recent Labs     12/28/21  0629 12/29/21  0305 12/30/21  0435   WBC 16.7* 12.8* 9.1   RBC 5.33 4.70 4.36*   HGB 16.0 14.0 13.2*   HCT 48.2 40.2* 36.9*   MCV 90.4 85.5 84.6   MCH 30.0 29.8 30.3   MCHC 33.2 34.8 35.8    143 144     Recent Labs     12/30/21  0435 12/30/21  0817 12/30/21  1145   * 144 142   K 3.8 3.2* 3.5   ANIONGAP 12 10 12   * 115* 112*   CO2 19* 19* 18*   BUN 19 17 14   CREATININE 1.0 0.9 0.8   GLUCOSE 315* 318* 216*   CALCIUM 7.9* 7.1* 7.8*     Recent Labs     12/30/21  0435 12/30/21  0817 12/30/21  1145   MG 1.9 1.6 1.5*   PHOS 1.9* 1.9* 1.0*     Recent Labs     12/27/21  1409   AST 32   ALT 33   BILITOT <0.2   ALKPHOS 120     No results for input(s): PH, PO2, PCO2, HCO3, BE, O2SAT in the last 72 hours. Recent Labs     12/27/21  1409 12/28/21  0012   TROPONINI <0.01 <0.01     Recent Labs     12/28/21  0629   INR 2.86*     No results for input(s): LACTA in the last 72 hours.       Intake/Output Summary (Last 24 hours) at 12/30/2021 1337  Last data filed at 12/30/2021 1029  Gross per 24 hour   Intake 2260.41 ml   Output 2200 ml   Net 60.41 ml XR CHEST PORTABLE    Result Date: 12/29/2021  EXAM: XR CHEST PORTABLE INDICATION: PICC placement COMPARISON: 12/27/2021 FINDINGS: Cardiac silhouette is normal size. No pleural effusion or visible pneumothorax. No significant change in bilateral airspace opacities. Right-sided PICC with tip overlying the SVC. Old bilateral rib fractures. 1.  Right-sided PICC with tip overlying the SVC.  2.  No significant change in bilateral airspace opacities appear Signed by Dr Lino Plater and Plan:   COVID-19 bilateral pneumonia  Dexamethasone  Baricitinib  Adjuvant therapy  Encouraged self proning  Incentive spirometry  Vaccination status unknown    Acute hypoxemic respiratory failure  Secondary to above  Requiring 2 L NC    DKA  Resolved  Insulin gtt transitioned to [facility] insulin  IVF administered  Frequent Accu-Cheks/electrolytes     Concern for alcohol withdrawal  CIWA protocol  Precedex gtt since weaned off  Daily thiamine/folic acid/MVI  Follow electrolytes  Counseled  Social work     DVT prophylaxis  Lovenox    Total critical care time: 48 minutes    Tasha Neri MD   12/30/2021 1:37 PM

## 2021-12-31 LAB
ALBUMIN SERPL-MCNC: 2.6 G/DL (ref 3.5–5.2)
ALP BLD-CCNC: 95 U/L (ref 40–130)
ALT SERPL-CCNC: 20 U/L (ref 5–41)
ANION GAP SERPL CALCULATED.3IONS-SCNC: 11 MMOL/L (ref 7–19)
AST SERPL-CCNC: 18 U/L (ref 5–40)
BANDED NEUTROPHILS RELATIVE PERCENT: 1 % (ref 0–5)
BASOPHILS ABSOLUTE: 0 K/UL (ref 0–0.2)
BASOPHILS RELATIVE PERCENT: 0 % (ref 0–1)
BETA-HYDROXYBUTYRATE: 80.7 MG/DL (ref 0–3)
BILIRUB SERPL-MCNC: 0.6 MG/DL (ref 0.2–1.2)
BUN BLDV-MCNC: 12 MG/DL (ref 6–20)
C-REACTIVE PROTEIN: 9.8 MG/DL (ref 0–0.5)
CALCIUM SERPL-MCNC: 7.9 MG/DL (ref 8.6–10)
CHLORIDE BLD-SCNC: 105 MMOL/L (ref 98–111)
CO2: 24 MMOL/L (ref 22–29)
CREAT SERPL-MCNC: 1 MG/DL (ref 0.5–1.2)
EOSINOPHILS ABSOLUTE: 0 K/UL (ref 0–0.6)
EOSINOPHILS RELATIVE PERCENT: 0 % (ref 0–5)
FIBRINOGEN: 490 MG/DL (ref 238–505)
GFR AFRICAN AMERICAN: >59
GFR NON-AFRICAN AMERICAN: >60
GLUCOSE BLD-MCNC: 200 MG/DL (ref 70–99)
GLUCOSE BLD-MCNC: 230 MG/DL (ref 74–109)
GLUCOSE BLD-MCNC: 255 MG/DL (ref 70–99)
GLUCOSE BLD-MCNC: 276 MG/DL (ref 70–99)
GLUCOSE BLD-MCNC: 316 MG/DL (ref 70–99)
HCT VFR BLD CALC: 37.7 % (ref 42–52)
HEMOGLOBIN: 12.7 G/DL (ref 14–18)
IMMATURE GRANULOCYTES #: 0.1 K/UL
LYMPHOCYTES ABSOLUTE: 0.4 K/UL (ref 1.1–4.5)
LYMPHOCYTES RELATIVE PERCENT: 4 % (ref 20–40)
MAGNESIUM: 2.1 MG/DL (ref 1.6–2.6)
MCH RBC QN AUTO: 29.5 PG (ref 27–31)
MCHC RBC AUTO-ENTMCNC: 33.7 G/DL (ref 33–37)
MCV RBC AUTO: 87.5 FL (ref 80–94)
MONOCYTES ABSOLUTE: 0.6 K/UL (ref 0–0.9)
MONOCYTES RELATIVE PERCENT: 5 % (ref 0–10)
NEUTROPHILS ABSOLUTE: 10.1 K/UL (ref 1.5–7.5)
NEUTROPHILS RELATIVE PERCENT: 90 % (ref 50–65)
PDW BLD-RTO: 13.7 % (ref 11.5–14.5)
PERFORMED ON: ABNORMAL
PLATELET # BLD: 133 K/UL (ref 130–400)
PLATELET SLIDE REVIEW: ADEQUATE
PMV BLD AUTO: 10.4 FL (ref 9.4–12.4)
POTASSIUM SERPL-SCNC: 3.5 MMOL/L (ref 3.5–5)
RBC # BLD: 4.31 M/UL (ref 4.7–6.1)
SODIUM BLD-SCNC: 140 MMOL/L (ref 136–145)
TOTAL PROTEIN: 5.7 G/DL (ref 6.6–8.7)
WBC # BLD: 11.1 K/UL (ref 4.8–10.8)

## 2021-12-31 PROCEDURE — 6370000000 HC RX 637 (ALT 250 FOR IP): Performed by: INTERNAL MEDICINE

## 2021-12-31 PROCEDURE — 83735 ASSAY OF MAGNESIUM: CPT

## 2021-12-31 PROCEDURE — 85025 COMPLETE CBC W/AUTO DIFF WBC: CPT

## 2021-12-31 PROCEDURE — 2580000003 HC RX 258: Performed by: INTERNAL MEDICINE

## 2021-12-31 PROCEDURE — 6360000002 HC RX W HCPCS: Performed by: INTERNAL MEDICINE

## 2021-12-31 PROCEDURE — 85384 FIBRINOGEN ACTIVITY: CPT

## 2021-12-31 PROCEDURE — 6360000002 HC RX W HCPCS: Performed by: HOSPITALIST

## 2021-12-31 PROCEDURE — 6370000000 HC RX 637 (ALT 250 FOR IP): Performed by: HOSPITALIST

## 2021-12-31 PROCEDURE — 82947 ASSAY GLUCOSE BLOOD QUANT: CPT

## 2021-12-31 PROCEDURE — 80053 COMPREHEN METABOLIC PANEL: CPT

## 2021-12-31 PROCEDURE — 2100000000 HC CCU R&B

## 2021-12-31 PROCEDURE — 2580000003 HC RX 258: Performed by: HOSPITALIST

## 2021-12-31 PROCEDURE — 86140 C-REACTIVE PROTEIN: CPT

## 2021-12-31 PROCEDURE — 2500000003 HC RX 250 WO HCPCS: Performed by: INTERNAL MEDICINE

## 2021-12-31 RX ORDER — DEXAMETHASONE SODIUM PHOSPHATE 4 MG/ML
4 INJECTION, SOLUTION INTRA-ARTICULAR; INTRALESIONAL; INTRAMUSCULAR; INTRAVENOUS; SOFT TISSUE EVERY 24 HOURS
Status: DISCONTINUED | OUTPATIENT
Start: 2021-12-31 | End: 2022-01-02

## 2021-12-31 RX ADMIN — GUAIFENESIN 400 MG: 200 TABLET ORAL at 14:26

## 2021-12-31 RX ADMIN — INSULIN LISPRO 4 UNITS: 100 INJECTION, SOLUTION INTRAVENOUS; SUBCUTANEOUS at 20:26

## 2021-12-31 RX ADMIN — INSULIN LISPRO 2 UNITS: 100 INJECTION, SOLUTION INTRAVENOUS; SUBCUTANEOUS at 11:51

## 2021-12-31 RX ADMIN — INSULIN LISPRO 6 UNITS: 100 INJECTION, SOLUTION INTRAVENOUS; SUBCUTANEOUS at 11:51

## 2021-12-31 RX ADMIN — BARICITINIB 4 MG: 2 TABLET, FILM COATED ORAL at 16:26

## 2021-12-31 RX ADMIN — Medication 2000 UNITS: at 08:28

## 2021-12-31 RX ADMIN — FOLIC ACID 1 MG: 1 TABLET ORAL at 08:28

## 2021-12-31 RX ADMIN — INSULIN LISPRO 2 UNITS: 100 INJECTION, SOLUTION INTRAVENOUS; SUBCUTANEOUS at 08:36

## 2021-12-31 RX ADMIN — ZINC SULFATE 220 MG (50 MG) CAPSULE 50 MG: CAPSULE at 08:28

## 2021-12-31 RX ADMIN — ENOXAPARIN SODIUM 40 MG: 100 INJECTION SUBCUTANEOUS at 08:29

## 2021-12-31 RX ADMIN — THIAMINE HYDROCHLORIDE 100 MG: 100 INJECTION, SOLUTION INTRAMUSCULAR; INTRAVENOUS at 08:29

## 2021-12-31 RX ADMIN — GUAIFENESIN 400 MG: 200 TABLET ORAL at 22:25

## 2021-12-31 RX ADMIN — INSULIN GLARGINE 15 UNITS: 100 INJECTION, SOLUTION SUBCUTANEOUS at 20:27

## 2021-12-31 RX ADMIN — ACETAMINOPHEN 650 MG: 325 TABLET ORAL at 03:15

## 2021-12-31 RX ADMIN — Medication 10 MG: at 20:26

## 2021-12-31 RX ADMIN — FAMOTIDINE 20 MG: 10 INJECTION, SOLUTION INTRAVENOUS at 20:26

## 2021-12-31 RX ADMIN — FAMOTIDINE 20 MG: 10 INJECTION, SOLUTION INTRAVENOUS at 08:29

## 2021-12-31 RX ADMIN — THERA TABS 1 TABLET: TAB at 08:28

## 2021-12-31 RX ADMIN — SODIUM CHLORIDE, PRESERVATIVE FREE 10 ML: 5 INJECTION INTRAVENOUS at 20:27

## 2021-12-31 RX ADMIN — AZITHROMYCIN MONOHYDRATE 250 MG: 500 INJECTION, POWDER, LYOPHILIZED, FOR SOLUTION INTRAVENOUS at 16:28

## 2021-12-31 RX ADMIN — INSULIN LISPRO 2 UNITS: 100 INJECTION, SOLUTION INTRAVENOUS; SUBCUTANEOUS at 16:35

## 2021-12-31 RX ADMIN — ACETAMINOPHEN 650 MG: 325 TABLET ORAL at 08:28

## 2021-12-31 RX ADMIN — GUAIFENESIN 400 MG: 200 TABLET ORAL at 05:59

## 2021-12-31 RX ADMIN — INSULIN LISPRO 6 UNITS: 100 INJECTION, SOLUTION INTRAVENOUS; SUBCUTANEOUS at 16:31

## 2021-12-31 RX ADMIN — DEXAMETHASONE SODIUM PHOSPHATE 4 MG: 4 INJECTION, SOLUTION INTRAMUSCULAR; INTRAVENOUS at 14:26

## 2021-12-31 RX ADMIN — INSULIN LISPRO 4 UNITS: 100 INJECTION, SOLUTION INTRAVENOUS; SUBCUTANEOUS at 08:30

## 2021-12-31 ASSESSMENT — PAIN SCALES - GENERAL
PAINLEVEL_OUTOF10: 2
PAINLEVEL_OUTOF10: 3
PAINLEVEL_OUTOF10: 0

## 2021-12-31 ASSESSMENT — PAIN DESCRIPTION - LOCATION: LOCATION: HEAD

## 2021-12-31 ASSESSMENT — PAIN DESCRIPTION - DESCRIPTORS: DESCRIPTORS: HEADACHE

## 2021-12-31 ASSESSMENT — PAIN DESCRIPTION - PAIN TYPE: TYPE: ACUTE PAIN

## 2021-12-31 NOTE — PLAN OF CARE
Problem: Airway Clearance - Ineffective  Goal: Achieve or maintain patent airway  Outcome: Ongoing     Problem: Gas Exchange - Impaired  Goal: Absence of hypoxia  Outcome: Ongoing  Goal: Promote optimal lung function  Outcome: Ongoing     Problem: Breathing Pattern - Ineffective  Goal: Ability to achieve and maintain a regular respiratory rate  Outcome: Ongoing     Problem:  Body Temperature -  Risk of, Imbalanced  Goal: Ability to maintain a body temperature within defined limits  Outcome: Ongoing  Goal: Will regain or maintain usual level of consciousness  Outcome: Ongoing  Goal: Complications related to the disease process, condition or treatment will be avoided or minimized  Outcome: Ongoing     Problem: Isolation Precautions - Risk of Spread of Infection  Goal: Prevent transmission of infection  Outcome: Ongoing     Problem: Nutrition Deficits  Goal: Optimize nutritional status  Outcome: Ongoing     Problem: Risk for Fluid Volume Deficit  Goal: Maintain normal heart rhythm  Outcome: Ongoing  Goal: Maintain absence of muscle cramping  Outcome: Ongoing  Goal: Maintain normal serum potassium, sodium, calcium, phosphorus, and pH  Outcome: Ongoing     Problem: Loneliness or Risk for Loneliness  Goal: Demonstrate positive use of time alone when socialization is not possible  Outcome: Ongoing     Problem: Fatigue  Goal: Verbalize increase energy and improved vitality  Outcome: Ongoing     Problem: Patient Education: Go to Patient Education Activity  Goal: Patient/Family Education  Outcome: Ongoing     Problem: Non-Violent Restraints  Goal: Removal from restraints as soon as assessed to be safe  Outcome: Ongoing  Goal: No harm/injury to patient while restraints in use  Outcome: Ongoing  Goal: Patient's dignity will be maintained  Outcome: Ongoing     Problem: Falls - Risk of:  Goal: Will remain free from falls  Description: Will remain free from falls  Outcome: Ongoing  Goal: Absence of physical injury  Description: Absence of physical injury  Outcome: Ongoing

## 2021-12-31 NOTE — PROGRESS NOTES
Hospitalist Progress Note  [facility]     Patient: Margo Mensah  : 1975  MRN: [id number]  Code Status: Full Code    Hospital Day:    Date of Service: 2021    Subjective:   Patient seen in [place] [address] critical care unit. No current complaints. History reviewed. No pertinent past medical history. History reviewed. No pertinent surgical history. History reviewed. No pertinent family history. Social History     Socioeconomic History    Marital status: Single     Spouse name: Not on file    Number of children: Not on file    Years of education: Not on file    Highest education level: Not on file   Occupational History    Not on file   Tobacco Use    Smoking status: Never Smoker    Smokeless tobacco: Never Used   Substance and Sexual Activity    Alcohol use: Yes    Drug use: Not Currently    Sexual activity: Not on file   Other Topics Concern    Not on file   Social History Narrative    Not on file     Social Determinants of Health     Financial Resource Strain:     Difficulty of Paying Living Expenses: Not on file   Food Insecurity:     Worried About Running Out of Food in the Last Year: Not on file    Emy of Food in the Last Year: Not on file   Transportation Needs:     Lack of Transportation (Medical): Not on file    Lack of Transportation (Non-Medical):  Not on file   Physical Activity:     Days of Exercise per Week: Not on file    Minutes of Exercise per Session: Not on file   Stress:     Feeling of Stress : Not on file   Social Connections:     Frequency of Communication with Friends and Family: Not on file    Frequency of Social Gatherings with Friends and Family: Not on file    Attends Catholic Services: Not on file    Active Member of Clubs or Organizations: Not on file    Attends Club or Organization Meetings: Not on file    Marital Status: Not on file   Intimate Partner Violence:     Fear of Current or Ex-Partner: Not on file   [employer] Abused: Not on file    Physically Abused: Not on file    Sexually Abused: Not on file   Housing Stability:     Unable to Pay for Housing in the Last Year: Not on file    Number of Places Lived in the Last Year: Not on file    Unstable Housing in the Last Year: Not on file       Current Facility-Administered Medications   Medication Dose Route Frequency Provider Last Rate Last Admin    potassium chloride 20 mEq/50 mL IVPB (Central Line)  20 mEq IntraVENous PRN Julio Singletary MD 50 mL/hr at 12/30/21 1324 20 mEq at 12/30/21 1324    insulin lispro (HUMALOG) injection vial 0-12 Units  0-12 Units SubCUTAneous TID WC Julio Singletary MD   6 Units at 12/31/21 1151    insulin lispro (HUMALOG) injection vial 0-6 Units  0-6 Units SubCUTAneous Nightly Julio Singletary MD   5 Units at 12/30/21 2024    guaiFENesin-codeine (GUAIFENESIN AC) 100-10 MG/5ML liquid 3 mL  3 mL Oral Q4H PRN Julio Singletary MD   3 mL at 12/30/21 0534    sodium phosphate 10 mmol in dextrose 5 % 250 mL IVPB  10 mmol IntraVENous Once Jensen Wayne MD        acetaminophen (TYLENOL) tablet 650 mg  650 mg Oral Q6H PRN Jensen Wayne MD   650 mg at 12/31/21 0828    famotidine (PEPCID) injection 20 mg  20 mg IntraVENous BID Jensen Wayne MD   20 mg at 12/31/21 8792    guaiFENesin tablet 400 mg  400 mg Oral Q8H Jensen Wayne MD   400 mg at 12/31/21 0559    melatonin disintegrating tablet 10 mg  10 mg Oral Nightly Jensen Wayne MD   10 mg at 12/30/21 2022    Vitamin D (CHOLECALCIFEROL) tablet 2,000 Units  2,000 Units Oral Daily Jensen Wayne MD   2,000 Units at 12/31/21 4200    zinc sulfate (ZINCATE) capsule 50 mg  50 mg Oral Daily Jensen Wayne MD   50 mg at 12/31/21 0828    dexamethasone (PF) (DECADRON) injection 6 mg  6 mg IntraVENous Q24H Jensen Wayne MD   6 mg at 12/30/21 1559    insulin glargine (LANTUS) injection vial 15 Units  15 Units SubCUTAneous Nightly Jensen Wayne MD   15 Units at 12/30/21 2022  insulin lispro (HUMALOG) injection vial 2 Units  2 Units SubCUTAneous TID WC Chloe Cevallos MD   2 Units at 12/31/21 1151    baricitinib (OLUMIANT) tablet 4 mg  4 mg Oral Q24H Chloe Cevallos MD   4 mg at 12/30/21 1601    dextrose 50 % IV solution  12.5 g IntraVENous PRN Chloe Cevallos MD        magnesium sulfate 1000 mg in dextrose 5% 100 mL IVPB  1,000 mg IntraVENous PRN Chloe Cevallos MD   Stopped at 12/30/21 1609    sodium phosphate 10 mmol in dextrose 5 % 250 mL IVPB  10 mmol IntraVENous PRN Chloe Cevallos MD        Or    sodium phosphate 15 mmol in dextrose 5 % 250 mL IVPB  15 mmol IntraVENous PRN Chloe Cevallos MD   Stopped at 12/30/21 2026    Or    sodium phosphate 20 mmol in dextrose 5 % 500 mL IVPB  20 mmol IntraVENous PRN Chloe Cevallos MD   Stopped at 12/30/21 0513    dextrose 5 % and 0.45 % sodium chloride infusion   IntraVENous Continuous PRN Chloe Cevallos MD   Stopped at 12/30/21 0333    enoxaparin (LOVENOX) injection 40 mg  40 mg SubCUTAneous Daily Chloe Cevallos MD   40 mg at 12/31/21 9006    multivitamin 1 tablet  1 tablet Oral Daily Chloe Cevallos MD   1 tablet at 12/31/21 0828    glucose (GLUTOSE) 40 % oral gel 15 g  15 g Oral PRN Chloe Cevallos MD        glucagon (rDNA) injection 1 mg  1 mg IntraMUSCular PRN Chloe Cevallos MD        dextrose 5 % solution  100 mL/hr IntraVENous PRN Chloe Cevallos MD        sodium chloride flush 0.9 % injection 5-40 mL  5-40 mL IntraVENous 2 times per day Chloe Cevallos MD   10 mL at 12/30/21 2025    sodium chloride flush 0.9 % injection 5-40 mL  5-40 mL IntraVENous PRN Chloe Cevallos MD        0.9 % sodium chloride infusion  25 mL IntraVENous PRN Chloe Cevallos  mL/hr at 12/31/21 0600 Rate Verify at 12/31/21 0600    potassium chloride 20 mEq/50 mL IVPB (Central Line)  20 mEq IntraVENous PRN Mckay Downs MD   Stopped at 12/30/21 0347    0.9 % sodium chloride infusion  25 mL IntraVENous PRN MD Ryan Macias thiamine (B-1) injection 100 mg  100 mg IntraVENous Daily Jose Velasquez DO   100 mg at 13/08/55 9384    folic acid (FOLVITE) tablet 1 mg  1 mg Oral Daily Abraham Rutherford MD   1 mg at 12/31/21 8675    sodium chloride flush 0.9 % injection 5-40 mL  5-40 mL IntraVENous 2 times per day Abraham Rutherford MD   10 mL at 12/30/21 2026    acetaminophen (TYLENOL) suppository 650 mg  650 mg Rectal Q6H PRN Abraham Rutherford MD        azithromycin (ZITHROMAX) 250 mg in dextrose 5 % 250 mL IVPB  250 mg IntraVENous Q24H Abraham Rutherford MD   Stopped at 12/30/21 1704    sodium chloride flush 0.9 % injection 5-40 mL  5-40 mL IntraVENous PRN Abraham Rutherford MD        ondansetron (ZOFRAN-ODT) disintegrating tablet 4 mg  4 mg Oral Q8H PRN Abraham Rutherford MD   4 mg at 12/30/21 1714    Or    ondansetron (ZOFRAN) injection 4 mg  4 mg IntraVENous Q6H PRN Abraham Rutherford MD        polyethylene glycol [facility]) packet 17 g  17 g Oral Daily PRN Abraham Rutherford MD             dextrose 5 % and 0.45 % NaCl Stopped (12/30/21 0333)    dextrose      sodium chloride 100 mL/hr at 12/31/21 0600    sodium chloride          Objective:   BP (!) 155/89   Pulse 129   Temp 98.7 °F (37.1 °C) (Temporal)   Resp 23   Ht 5' 9\" (1.753 m)   Wt 170 lb (77.1 kg)   SpO2 93%   BMI 25.10 kg/m²     In an effort to reduce COVID-19 exposure, patient seen from doorway and case discussed in detail with unit staff     Recent Labs     12/29/21  0305 12/30/21  0435 12/31/21  0323   WBC 12.8* 9.1 11.1*   RBC 4.70 4.36* 4.31*   HGB 14.0 13.2* 12.7*   HCT 40.2* 36.9* 37.7*   MCV 85.5 84.6 87.5   MCH 29.8 30.3 29.5   MCHC 34.8 35.8 33.7    144 133     Recent Labs     12/30/21  0817 12/30/21  1145 12/31/21  0323    142 140   K 3.2* 3.5 3.5   ANIONGAP 10 12 11   * 112* 105   CO2 19* 18* 24   BUN 17 14 12   CREATININE 0.9 0.8 1.0   GLUCOSE 318* 216* 230*   CALCIUM 7.1* 7.8* 7.9* Recent Labs     12/30/21  0435 12/30/21  0435 12/30/21  0817 12/30/21  1145 12/31/21  0323   MG 1.9   < > 1.6 1.5* 2.1   PHOS 1.9*  --  1.9* 1.0*  --     < > = values in this interval not displayed. Recent Labs     12/31/21  0323   AST 18   ALT 20   BILITOT 0.6   ALKPHOS 95     No results for input(s): PH, PO2, PCO2, HCO3, BE, O2SAT in the last 72 hours. No results for input(s): TROPONINI in the last 72 hours. No results for input(s): INR in the last 72 hours. No results for input(s): LACTA in the last 72 hours. Intake/Output Summary (Last 24 hours) at 12/31/2021 1245  Last data filed at 12/31/2021 0800  Gross per 24 hour   Intake 4877.37 ml   Output 3375 ml   Net 1502.37 ml       XR CHEST PORTABLE    Result Date: 12/29/2021  EXAM: XR CHEST PORTABLE INDICATION: PICC placement COMPARISON: 12/27/2021 FINDINGS: Cardiac silhouette is normal size. No pleural effusion or visible pneumothorax. No significant change in bilateral airspace opacities. Right-sided PICC with tip overlying the SVC. Old bilateral rib fractures. 1.  Right-sided PICC with tip overlying the SVC.  2.  No significant change in bilateral airspace opacities appear Signed by Dr Yelena Mccollum and Plan:   COVID-19 bilateral pneumonia  Dexamethasone  Baricitinib  Adjuvant therapy  Encouraged self proning  Incentive spirometry  Unvaccinated     Acute hypoxemic respiratory failure  Secondary to above  Trial off supplemental oxygen     DKA  Resolved  Insulin gtt transitioned to [facility] insulin  IVF administered  Frequent Accu-Cheks/electrolytes     Concern for alcohol withdrawal  CIWA protocol  Precedex gtt since weaned off  Daily thiamine/folic acid/MVI  Follow electrolytes  Counseled  Social work     DVT prophylaxis  Lovenox    Total critical care time: 41 minutes    Betina Blum MD   12/31/2021 12:45 PM

## 2021-12-31 NOTE — CARE COORDINATION
Informed patient that he has an appointment with the [facility] 1/10/2022 at 8:00 a.m. Informed him that he needs to call the office prior to his appointment and provide his Health Department Covid Quarantine release date. Provided patient with Addiction Facilities information.   Electronically signed by Han Huynh RN on 12/31/2021 at 4:17 PM

## 2022-01-01 LAB
ALBUMIN SERPL-MCNC: 2.7 G/DL (ref 3.5–5.2)
ALP BLD-CCNC: 89 U/L (ref 40–130)
ALT SERPL-CCNC: 25 U/L (ref 5–41)
ANION GAP SERPL CALCULATED.3IONS-SCNC: 11 MMOL/L (ref 7–19)
AST SERPL-CCNC: 27 U/L (ref 5–40)
BANDED NEUTROPHILS RELATIVE PERCENT: 3 % (ref 0–5)
BASOPHILS ABSOLUTE: 0 K/UL (ref 0–0.2)
BASOPHILS RELATIVE PERCENT: 0 % (ref 0–1)
BILIRUB SERPL-MCNC: 0.6 MG/DL (ref 0.2–1.2)
BLOOD CULTURE, ROUTINE: NORMAL
BUN BLDV-MCNC: 10 MG/DL (ref 6–20)
C-REACTIVE PROTEIN: 11.53 MG/DL (ref 0–0.5)
CALCIUM SERPL-MCNC: 8 MG/DL (ref 8.6–10)
CHLORIDE BLD-SCNC: 99 MMOL/L (ref 98–111)
CO2: 27 MMOL/L (ref 22–29)
CREAT SERPL-MCNC: 0.8 MG/DL (ref 0.5–1.2)
CULTURE, BLOOD 2: NORMAL
EOSINOPHILS ABSOLUTE: 0 K/UL (ref 0–0.6)
EOSINOPHILS RELATIVE PERCENT: 0 % (ref 0–5)
FIBRINOGEN: 471 MG/DL (ref 238–505)
GFR AFRICAN AMERICAN: >59
GFR NON-AFRICAN AMERICAN: >60
GLUCOSE BLD-MCNC: 206 MG/DL (ref 74–109)
GLUCOSE BLD-MCNC: 207 MG/DL (ref 70–99)
GLUCOSE BLD-MCNC: 223 MG/DL (ref 70–99)
GLUCOSE BLD-MCNC: 278 MG/DL (ref 70–99)
GLUCOSE BLD-MCNC: 295 MG/DL (ref 70–99)
HCT VFR BLD CALC: 36.1 % (ref 42–52)
HEMOGLOBIN: 12.4 G/DL (ref 14–18)
IMMATURE GRANULOCYTES #: 0.2 K/UL
LYMPHOCYTES ABSOLUTE: 0.8 K/UL (ref 1.1–4.5)
LYMPHOCYTES RELATIVE PERCENT: 13 % (ref 20–40)
MAGNESIUM: 2.1 MG/DL (ref 1.6–2.6)
MCH RBC QN AUTO: 30.2 PG (ref 27–31)
MCHC RBC AUTO-ENTMCNC: 34.3 G/DL (ref 33–37)
MCV RBC AUTO: 87.8 FL (ref 80–94)
MONOCYTES ABSOLUTE: 1 K/UL (ref 0–0.9)
MONOCYTES RELATIVE PERCENT: 15 % (ref 0–10)
NEUTROPHILS ABSOLUTE: 4.6 K/UL (ref 1.5–7.5)
NEUTROPHILS RELATIVE PERCENT: 69 % (ref 50–65)
PDW BLD-RTO: 13.3 % (ref 11.5–14.5)
PERFORMED ON: ABNORMAL
PLATELET # BLD: 128 K/UL (ref 130–400)
PLATELET SLIDE REVIEW: ADEQUATE
PMV BLD AUTO: 11 FL (ref 9.4–12.4)
POTASSIUM SERPL-SCNC: 2.9 MMOL/L (ref 3.5–5)
RBC # BLD: 4.11 M/UL (ref 4.7–6.1)
SODIUM BLD-SCNC: 137 MMOL/L (ref 136–145)
TOTAL PROTEIN: 5.8 G/DL (ref 6.6–8.7)
WBC # BLD: 6.4 K/UL (ref 4.8–10.8)

## 2022-01-01 PROCEDURE — 6370000000 HC RX 637 (ALT 250 FOR IP): Performed by: INTERNAL MEDICINE

## 2022-01-01 PROCEDURE — 86140 C-REACTIVE PROTEIN: CPT

## 2022-01-01 PROCEDURE — 83735 ASSAY OF MAGNESIUM: CPT

## 2022-01-01 PROCEDURE — 1210000000 HC MED SURG R&B

## 2022-01-01 PROCEDURE — 2500000003 HC RX 250 WO HCPCS: Performed by: INTERNAL MEDICINE

## 2022-01-01 PROCEDURE — 6360000002 HC RX W HCPCS: Performed by: INTERNAL MEDICINE

## 2022-01-01 PROCEDURE — 82947 ASSAY GLUCOSE BLOOD QUANT: CPT

## 2022-01-01 PROCEDURE — 6360000002 HC RX W HCPCS: Performed by: HOSPITALIST

## 2022-01-01 PROCEDURE — 2580000003 HC RX 258: Performed by: HOSPITALIST

## 2022-01-01 PROCEDURE — 2580000003 HC RX 258: Performed by: INTERNAL MEDICINE

## 2022-01-01 PROCEDURE — 94761 N-INVAS EAR/PLS OXIMETRY MLT: CPT

## 2022-01-01 PROCEDURE — 6370000000 HC RX 637 (ALT 250 FOR IP): Performed by: HOSPITALIST

## 2022-01-01 PROCEDURE — 85025 COMPLETE CBC W/AUTO DIFF WBC: CPT

## 2022-01-01 PROCEDURE — 85384 FIBRINOGEN ACTIVITY: CPT

## 2022-01-01 PROCEDURE — 80053 COMPREHEN METABOLIC PANEL: CPT

## 2022-01-01 RX ORDER — POTASSIUM CHLORIDE 20 MEQ/1
60 TABLET, EXTENDED RELEASE ORAL ONCE
Status: COMPLETED | OUTPATIENT
Start: 2022-01-01 | End: 2022-01-01

## 2022-01-01 RX ORDER — MAGNESIUM SULFATE 1 G/100ML
1000 INJECTION INTRAVENOUS PRN
Status: DISCONTINUED | OUTPATIENT
Start: 2022-01-01 | End: 2022-01-03 | Stop reason: HOSPADM

## 2022-01-01 RX ORDER — POTASSIUM CHLORIDE 20 MEQ/1
40 TABLET, EXTENDED RELEASE ORAL PRN
Status: DISCONTINUED | OUTPATIENT
Start: 2022-01-01 | End: 2022-01-03 | Stop reason: HOSPADM

## 2022-01-01 RX ORDER — POTASSIUM CHLORIDE 7.45 MG/ML
10 INJECTION INTRAVENOUS PRN
Status: DISCONTINUED | OUTPATIENT
Start: 2022-01-01 | End: 2022-01-03 | Stop reason: HOSPADM

## 2022-01-01 RX ADMIN — ZINC SULFATE 220 MG (50 MG) CAPSULE 50 MG: CAPSULE at 07:47

## 2022-01-01 RX ADMIN — Medication 10 MG: at 21:19

## 2022-01-01 RX ADMIN — DEXAMETHASONE SODIUM PHOSPHATE 4 MG: 4 INJECTION, SOLUTION INTRAMUSCULAR; INTRAVENOUS at 13:41

## 2022-01-01 RX ADMIN — GUAIFENESIN 400 MG: 200 TABLET ORAL at 13:41

## 2022-01-01 RX ADMIN — INSULIN LISPRO 2 UNITS: 100 INJECTION, SOLUTION INTRAVENOUS; SUBCUTANEOUS at 18:12

## 2022-01-01 RX ADMIN — FAMOTIDINE 20 MG: 10 INJECTION, SOLUTION INTRAVENOUS at 21:20

## 2022-01-01 RX ADMIN — GUAIFENESIN AND CODEINE PHOSPHATE 3 ML: 10; 100 LIQUID ORAL at 11:06

## 2022-01-01 RX ADMIN — AZITHROMYCIN MONOHYDRATE 250 MG: 500 INJECTION, POWDER, LYOPHILIZED, FOR SOLUTION INTRAVENOUS at 21:11

## 2022-01-01 RX ADMIN — POTASSIUM CHLORIDE 20 MEQ: 29.8 INJECTION INTRAVENOUS at 10:10

## 2022-01-01 RX ADMIN — FOLIC ACID 1 MG: 1 TABLET ORAL at 07:48

## 2022-01-01 RX ADMIN — SODIUM CHLORIDE, PRESERVATIVE FREE 10 ML: 5 INJECTION INTRAVENOUS at 07:48

## 2022-01-01 RX ADMIN — INSULIN LISPRO 4 UNITS: 100 INJECTION, SOLUTION INTRAVENOUS; SUBCUTANEOUS at 11:54

## 2022-01-01 RX ADMIN — THIAMINE HYDROCHLORIDE 100 MG: 100 INJECTION, SOLUTION INTRAMUSCULAR; INTRAVENOUS at 07:48

## 2022-01-01 RX ADMIN — INSULIN LISPRO 3 UNITS: 100 INJECTION, SOLUTION INTRAVENOUS; SUBCUTANEOUS at 21:20

## 2022-01-01 RX ADMIN — GUAIFENESIN 400 MG: 200 TABLET ORAL at 21:19

## 2022-01-01 RX ADMIN — INSULIN LISPRO 2 UNITS: 100 INJECTION, SOLUTION INTRAVENOUS; SUBCUTANEOUS at 07:50

## 2022-01-01 RX ADMIN — FAMOTIDINE 20 MG: 10 INJECTION, SOLUTION INTRAVENOUS at 07:47

## 2022-01-01 RX ADMIN — INSULIN LISPRO 2 UNITS: 100 INJECTION, SOLUTION INTRAVENOUS; SUBCUTANEOUS at 11:53

## 2022-01-01 RX ADMIN — ENOXAPARIN SODIUM 40 MG: 100 INJECTION SUBCUTANEOUS at 07:48

## 2022-01-01 RX ADMIN — INSULIN GLARGINE 15 UNITS: 100 INJECTION, SOLUTION SUBCUTANEOUS at 21:20

## 2022-01-01 RX ADMIN — GUAIFENESIN 400 MG: 200 TABLET ORAL at 05:44

## 2022-01-01 RX ADMIN — POTASSIUM CHLORIDE 60 MEQ: 1500 TABLET, EXTENDED RELEASE ORAL at 13:41

## 2022-01-01 RX ADMIN — THERA TABS 1 TABLET: TAB at 07:48

## 2022-01-01 RX ADMIN — POTASSIUM CHLORIDE 20 MEQ: 29.8 INJECTION INTRAVENOUS at 05:47

## 2022-01-01 RX ADMIN — INSULIN LISPRO 4 UNITS: 100 INJECTION, SOLUTION INTRAVENOUS; SUBCUTANEOUS at 07:49

## 2022-01-01 RX ADMIN — Medication 2000 UNITS: at 07:47

## 2022-01-01 RX ADMIN — INSULIN LISPRO 4 UNITS: 100 INJECTION, SOLUTION INTRAVENOUS; SUBCUTANEOUS at 18:13

## 2022-01-01 RX ADMIN — POTASSIUM CHLORIDE 20 MEQ: 29.8 INJECTION INTRAVENOUS at 07:02

## 2022-01-01 RX ADMIN — SODIUM CHLORIDE, PRESERVATIVE FREE 10 ML: 5 INJECTION INTRAVENOUS at 21:19

## 2022-01-01 RX ADMIN — ACETAMINOPHEN 650 MG: 325 TABLET ORAL at 02:37

## 2022-01-01 RX ADMIN — BARICITINIB 4 MG: 2 TABLET, FILM COATED ORAL at 18:13

## 2022-01-01 ASSESSMENT — PAIN - FUNCTIONAL ASSESSMENT: PAIN_FUNCTIONAL_ASSESSMENT: PREVENTS OR INTERFERES SOME ACTIVE ACTIVITIES AND ADLS

## 2022-01-01 ASSESSMENT — PAIN DESCRIPTION - PAIN TYPE: TYPE: ACUTE PAIN

## 2022-01-01 ASSESSMENT — PAIN DESCRIPTION - DESCRIPTORS: DESCRIPTORS: ACHING;DISCOMFORT

## 2022-01-01 ASSESSMENT — PAIN DESCRIPTION - DIRECTION: RADIATING_TOWARDS: GENERALIZED

## 2022-01-01 ASSESSMENT — PAIN DESCRIPTION - LOCATION: LOCATION: GENERALIZED

## 2022-01-01 ASSESSMENT — PAIN DESCRIPTION - FREQUENCY: FREQUENCY: INTERMITTENT

## 2022-01-01 ASSESSMENT — PAIN DESCRIPTION - PROGRESSION: CLINICAL_PROGRESSION: GRADUALLY IMPROVING

## 2022-01-01 ASSESSMENT — PAIN SCALES - GENERAL: PAINLEVEL_OUTOF10: 1

## 2022-01-01 ASSESSMENT — PAIN DESCRIPTION - ONSET: ONSET: ON-GOING

## 2022-01-01 NOTE — PROGRESS NOTES
Hospitalist Progress Note  [facility]     Patient: Cristiana Mendoza  : 1975  MRN: [id number]  Code Status: Full Code    Hospital Day: 5   Date of Service: 2022    Subjective:   Patient seen in [place] [address] critical care unit. No current complaints. History reviewed. No pertinent past medical history. History reviewed. No pertinent surgical history. History reviewed. No pertinent family history. Social History     Socioeconomic History    Marital status: Single     Spouse name: Not on file    Number of children: Not on file    Years of education: Not on file    Highest education level: Not on file   Occupational History    Not on file   Tobacco Use    Smoking status: Never Smoker    Smokeless tobacco: Never Used   Substance and Sexual Activity    Alcohol use: Yes    Drug use: Not Currently    Sexual activity: Not on file   Other Topics Concern    Not on file   Social History Narrative    Not on file     Social Determinants of Health     Financial Resource Strain:     Difficulty of Paying Living Expenses: Not on file   Food Insecurity:     Worried About Running Out of Food in the Last Year: Not on file    Emy of Food in the Last Year: Not on file   Transportation Needs:     Lack of Transportation (Medical): Not on file    Lack of Transportation (Non-Medical):  Not on file   Physical Activity:     Days of Exercise per Week: Not on file    Minutes of Exercise per Session: Not on file   Stress:     Feeling of Stress : Not on file   Social Connections:     Frequency of Communication with Friends and Family: Not on file    Frequency of Social Gatherings with Friends and Family: Not on file    Attends Latter day Services: Not on file    Active Member of Clubs or Organizations: Not on file    Attends Club or Organization Meetings: Not on file    Marital Status: Not on file   Intimate Partner Violence:     Fear of Current or Ex-Partner: Not on file   [employer] Abused: Not on file    Physically Abused: Not on file    Sexually Abused: Not on file   Housing Stability:     Unable to Pay for Housing in the Last Year: Not on file    Number of Places Lived in the Last Year: Not on file    Unstable Housing in the Last Year: Not on file       Current Facility-Administered Medications   Medication Dose Route Frequency Provider Last Rate Last Admin    magic (miracle) mouthwash  5 mL Swish & Spit 4x Daily PRN Shanae Colbert MD        magnesium sulfate 1000 mg in dextrose 5% 100 mL IVPB  1,000 mg IntraVENous PRN Shanae Colbert MD        potassium chloride (KLOR-CON M) extended release tablet 40 mEq  40 mEq Oral PRN Shanae Colbert MD        Or    potassium bicarb-citric acid (EFFER-K) effervescent tablet 40 mEq  40 mEq Oral PRN Shanae Colbert MD        Or    potassium chloride 10 mEq/100 mL IVPB (Peripheral Line)  10 mEq IntraVENous PRN Shanae Colbert MD        potassium chloride (KLOR-CON M) extended release tablet 60 mEq  60 mEq Oral Once Shanae Colbert MD        dexamethasone (DECADRON) injection 4 mg  4 mg IntraVENous Q24H Shanae Colbert MD   4 mg at 12/31/21 1426    potassium chloride 20 mEq/50 mL IVPB (Central Line)  20 mEq IntraVENous PRN Aung Cordero MD 50 mL/hr at 01/01/22 1010 20 mEq at 01/01/22 1010    insulin lispro (HUMALOG) injection vial 0-12 Units  0-12 Units SubCUTAneous TID WC Aung Cordero MD   4 Units at 01/01/22 1154    insulin lispro (HUMALOG) injection vial 0-6 Units  0-6 Units SubCUTAneous Nightly Aung Cordero MD   4 Units at 12/31/21 2026    guaiFENesin-codeine (GUAIFENESIN AC) 100-10 MG/5ML liquid 3 mL  3 mL Oral Q4H PRN Aung Cordero MD   3 mL at 01/01/22 1106    sodium phosphate 10 mmol in dextrose 5 % 250 mL IVPB  10 mmol IntraVENous Once Shanae Colbert MD        acetaminophen (TYLENOL) tablet 650 mg  650 mg Oral Q6H PRN Shanae Colbert MD   650 mg at 01/01/22 0237    famotidine (PEPCID) injection 20 mg  20 mg IntraVENous BID Andra Bernal MD   20 mg at 01/01/22 0747    guaiFENesin tablet 400 mg  400 mg Oral Q8H Andra Bernal MD   400 mg at 01/01/22 0544    melatonin disintegrating tablet 10 mg  10 mg Oral Nightly Andra Bernal MD   10 mg at 12/31/21 2026    Vitamin D (CHOLECALCIFEROL) tablet 2,000 Units  2,000 Units Oral Daily nAdra Bernal MD   2,000 Units at 01/01/22 0747    zinc sulfate (ZINCATE) capsule 50 mg  50 mg Oral Daily Andra Bernal MD   50 mg at 01/01/22 0747    insulin glargine (LANTUS) injection vial 15 Units  15 Units SubCUTAneous Nightly Andra Bernal MD   15 Units at 12/31/21 2027    insulin lispro (HUMALOG) injection vial 2 Units  2 Units SubCUTAneous TID WC Andra Bernal MD   2 Units at 01/01/22 1153    baricitinib (OLUMIANT) tablet 4 mg  4 mg Oral Q24H Andra Bernal MD   4 mg at 12/31/21 1626    dextrose 50 % IV solution  12.5 g IntraVENous PRN Andra Bernal MD        magnesium sulfate 1000 mg in dextrose 5% 100 mL IVPB  1,000 mg IntraVENous PRN Andra Bernal MD   Stopped at 12/30/21 1609    sodium phosphate 10 mmol in dextrose 5 % 250 mL IVPB  10 mmol IntraVENous PRN Andra Bernal MD        Or    sodium phosphate 15 mmol in dextrose 5 % 250 mL IVPB  15 mmol IntraVENous PRN Andra Bernal MD   Stopped at 12/30/21 2026    Or    sodium phosphate 20 mmol in dextrose 5 % 500 mL IVPB  20 mmol IntraVENous PRN Andra Bernal MD   Stopped at 12/30/21 0513    dextrose 5 % and 0.45 % sodium chloride infusion   IntraVENous Continuous PRN Andra Bernal MD   Stopped at 12/30/21 0333    enoxaparin (LOVENOX) injection 40 mg  40 mg SubCUTAneous Daily Andra Bernal MD   40 mg at 01/01/22 6269    multivitamin 1 tablet  1 tablet Oral Daily Andra Bernal MD   1 tablet at 01/01/22 0748    glucose (GLUTOSE) 40 % oral gel 15 g  15 g Oral PRN Andra Bernal MD        glucagon (rDNA) injection 1 mg  1 mg IntraMUSCular PRN Andra Bernal MD        dextrose 5 % solution  100 mL/hr IntraVENous PRN Flaco Palmer MD        sodium chloride flush 0.9 % injection 5-40 mL  5-40 mL IntraVENous 2 times per day Flaco Palmer MD   10 mL at 01/01/22 0748    sodium chloride flush 0.9 % injection 5-40 mL  5-40 mL IntraVENous PRN Flaco Palmer MD        0.9 % sodium chloride infusion  25 mL IntraVENous PRN Flaco Palmer  mL/hr at 12/31/21 0600 Rate Verify at 12/31/21 0600    potassium chloride 20 mEq/50 mL IVPB (Central Line)  20 mEq IntraVENous PRN Mary Yusuf MD 50 mL/hr at 01/01/22 0702 20 mEq at 01/01/22 3876    0.9 % sodium chloride infusion  25 mL IntraVENous PRN Mary Yusuf MD        thiamine (B-1) injection 100 mg  100 mg IntraVENous Daily Jose Dos Santos DO   100 mg at 09/22/89 2098    folic acid (FOLVITE) tablet 1 mg  1 mg Oral Daily Mary Yusuf MD   1 mg at 01/01/22 0748    acetaminophen (TYLENOL) suppository 650 mg  650 mg Rectal Q6H PRN Mary Yusuf MD        azithromycin (ZITHROMAX) 250 mg in dextrose 5 % 250 mL IVPB  250 mg IntraVENous Q24H Mary Yusuf MD   Stopped at 12/31/21 1736    ondansetron (ZOFRAN-ODT) disintegrating tablet 4 mg  4 mg Oral Q8H PRN Mary Yusuf MD   4 mg at 12/30/21 1714    Or    ondansetron (ZOFRAN) injection 4 mg  4 mg IntraVENous Q6H PRN Mary Yusuf MD        polyethylene glycol [facility]) packet 17 g  17 g Oral Daily PRN Mary Yusuf MD             dextrose 5 % and 0.45 % NaCl Stopped (12/30/21 0333)    dextrose      sodium chloride 100 mL/hr at 12/31/21 0600    sodium chloride          Objective:   /78   Pulse 93   Temp 98.2 °F (36.8 °C) (Temporal)   Resp 23   Ht 5' 9\" (1.753 m)   Wt 170 lb (77.1 kg)   SpO2 93%   BMI 25.10 kg/m²     In an effort to reduce COVID-19 exposure, patient seen from doorway and case discussed in detail with unit staff    Recent Labs     12/30/21  0435 12/31/21  0323 01/01/22  0240   WBC 9.1 11.1* 6.4 RBC 4.36* 4.31* 4.11*   HGB 13.2* 12.7* 12.4*   HCT 36.9* 37.7* 36.1*   MCV 84.6 87.5 87.8   MCH 30.3 29.5 30.2   MCHC 35.8 33.7 34.3    133 128*     Recent Labs     12/30/21  1145 12/31/21  0323 01/01/22  0240    140 137   K 3.5 3.5 2.9*   ANIONGAP 12 11 11   * 105 99   CO2 18* 24 27   BUN 14 12 10   CREATININE 0.8 1.0 0.8   GLUCOSE 216* 230* 206*   CALCIUM 7.8* 7.9* 8.0*     Recent Labs     12/30/21  0435 12/30/21  0435 12/30/21  0817 12/30/21  0817 12/30/21  1145 12/31/21  0323 01/01/22  0240   MG 1.9   < > 1.6   < > 1.5* 2.1 2.1   PHOS 1.9*  --  1.9*  --  1.0*  --   --     < > = values in this interval not displayed. Recent Labs     12/31/21  0323 01/01/22  0240   AST 18 27   ALT 20 25   BILITOT 0.6 0.6   ALKPHOS 95 89     No results for input(s): PH, PO2, PCO2, HCO3, BE, O2SAT in the last 72 hours. No results for input(s): TROPONINI in the last 72 hours. No results for input(s): INR in the last 72 hours. No results for input(s): LACTA in the last 72 hours. Intake/Output Summary (Last 24 hours) at 1/1/2022 1248  Last data filed at 1/1/2022 1200  Gross per 24 hour   Intake 600 ml   Output 3480 ml   Net -2880 ml       No results found.      Assessment and Plan:   COVID-19 bilateral pneumonia  Dexamethasone  Baricitinib  Adjuvant therapy  Encouraged self proning  Incentive spirometry  Unvaccinated     Acute hypoxemic respiratory failure  Resolved  Secondary to above    DKA  Resolved  Insulin gtt transitioned to SC insulin  IVF administered  Frequent Accu-Cheks/electrolytes     Concern for alcohol withdrawal  CIWA protocol  Precedex gtt since weaned off  Daily thiamine/folic acid/MVI  Follow electrolytes  Counseled  Social work     DVT prophylaxis  Lovenox    Carmie Rocks, MD   1/1/2022 12:48 PM

## 2022-01-02 LAB
ALBUMIN SERPL-MCNC: 2.8 G/DL (ref 3.5–5.2)
ALP BLD-CCNC: 87 U/L (ref 40–130)
ALT SERPL-CCNC: 30 U/L (ref 5–41)
ANION GAP SERPL CALCULATED.3IONS-SCNC: 12 MMOL/L (ref 7–19)
AST SERPL-CCNC: 31 U/L (ref 5–40)
ATYPICAL LYMPHOCYTE RELATIVE PERCENT: 7 % (ref 0–8)
BASOPHILS ABSOLUTE: 0 K/UL (ref 0–0.2)
BASOPHILS RELATIVE PERCENT: 0 % (ref 0–1)
BILIRUB SERPL-MCNC: 0.5 MG/DL (ref 0.2–1.2)
BUN BLDV-MCNC: 15 MG/DL (ref 6–20)
C-REACTIVE PROTEIN: 7.77 MG/DL (ref 0–0.5)
CALCIUM SERPL-MCNC: 8.5 MG/DL (ref 8.6–10)
CHLORIDE BLD-SCNC: 96 MMOL/L (ref 98–111)
CO2: 26 MMOL/L (ref 22–29)
CREAT SERPL-MCNC: 0.8 MG/DL (ref 0.5–1.2)
EOSINOPHILS ABSOLUTE: 0 K/UL (ref 0–0.6)
EOSINOPHILS RELATIVE PERCENT: 0 % (ref 0–5)
FIBRINOGEN: 530 MG/DL (ref 238–505)
GFR AFRICAN AMERICAN: >59
GFR NON-AFRICAN AMERICAN: >60
GLUCOSE BLD-MCNC: 200 MG/DL (ref 70–99)
GLUCOSE BLD-MCNC: 205 MG/DL (ref 74–109)
GLUCOSE BLD-MCNC: 308 MG/DL (ref 70–99)
GLUCOSE BLD-MCNC: 330 MG/DL (ref 70–99)
GLUCOSE BLD-MCNC: 332 MG/DL (ref 70–99)
GLUCOSE BLD-MCNC: 424 MG/DL (ref 70–99)
HCT VFR BLD CALC: 40.1 % (ref 42–52)
HEMOGLOBIN: 13.5 G/DL (ref 14–18)
IMMATURE GRANULOCYTES #: 0.2 K/UL
LYMPHOCYTES ABSOLUTE: 2.8 K/UL (ref 1.1–4.5)
LYMPHOCYTES RELATIVE PERCENT: 36 % (ref 20–40)
MAGNESIUM: 2.2 MG/DL (ref 1.6–2.6)
MCH RBC QN AUTO: 30.5 PG (ref 27–31)
MCHC RBC AUTO-ENTMCNC: 33.7 G/DL (ref 33–37)
MCV RBC AUTO: 90.7 FL (ref 80–94)
MONOCYTES ABSOLUTE: 1.9 K/UL (ref 0–0.9)
MONOCYTES RELATIVE PERCENT: 29 % (ref 0–10)
NEUTROPHILS ABSOLUTE: 1.8 K/UL (ref 1.5–7.5)
NEUTROPHILS RELATIVE PERCENT: 28 % (ref 50–65)
PDW BLD-RTO: 13.2 % (ref 11.5–14.5)
PERFORMED ON: ABNORMAL
PLATELET # BLD: 163 K/UL (ref 130–400)
PLATELET SLIDE REVIEW: ADEQUATE
PMV BLD AUTO: 11 FL (ref 9.4–12.4)
POTASSIUM SERPL-SCNC: 3.9 MMOL/L (ref 3.5–5)
RBC # BLD: 4.42 M/UL (ref 4.7–6.1)
RBC # BLD: NORMAL 10*6/UL
SODIUM BLD-SCNC: 134 MMOL/L (ref 136–145)
TOTAL PROTEIN: 6.4 G/DL (ref 6.6–8.7)
WBC # BLD: 6.4 K/UL (ref 4.8–10.8)

## 2022-01-02 PROCEDURE — 1210000000 HC MED SURG R&B

## 2022-01-02 PROCEDURE — 6370000000 HC RX 637 (ALT 250 FOR IP): Performed by: HOSPITALIST

## 2022-01-02 PROCEDURE — 83735 ASSAY OF MAGNESIUM: CPT

## 2022-01-02 PROCEDURE — 2500000003 HC RX 250 WO HCPCS: Performed by: INTERNAL MEDICINE

## 2022-01-02 PROCEDURE — 86140 C-REACTIVE PROTEIN: CPT

## 2022-01-02 PROCEDURE — 6370000000 HC RX 637 (ALT 250 FOR IP): Performed by: INTERNAL MEDICINE

## 2022-01-02 PROCEDURE — 6360000002 HC RX W HCPCS: Performed by: INTERNAL MEDICINE

## 2022-01-02 PROCEDURE — 2580000003 HC RX 258: Performed by: INTERNAL MEDICINE

## 2022-01-02 PROCEDURE — 85384 FIBRINOGEN ACTIVITY: CPT

## 2022-01-02 PROCEDURE — 85025 COMPLETE CBC W/AUTO DIFF WBC: CPT

## 2022-01-02 PROCEDURE — 80053 COMPREHEN METABOLIC PANEL: CPT

## 2022-01-02 PROCEDURE — 82947 ASSAY GLUCOSE BLOOD QUANT: CPT

## 2022-01-02 RX ORDER — AZITHROMYCIN 250 MG/1
250 TABLET, FILM COATED ORAL DAILY
Status: DISCONTINUED | OUTPATIENT
Start: 2022-01-02 | End: 2022-01-03 | Stop reason: HOSPADM

## 2022-01-02 RX ORDER — DEXAMETHASONE SODIUM PHOSPHATE 4 MG/ML
2 INJECTION, SOLUTION INTRA-ARTICULAR; INTRALESIONAL; INTRAMUSCULAR; INTRAVENOUS; SOFT TISSUE EVERY 24 HOURS
Status: DISCONTINUED | OUTPATIENT
Start: 2022-01-03 | End: 2022-01-03 | Stop reason: HOSPADM

## 2022-01-02 RX ADMIN — SODIUM CHLORIDE, PRESERVATIVE FREE 10 ML: 5 INJECTION INTRAVENOUS at 21:02

## 2022-01-02 RX ADMIN — FAMOTIDINE 20 MG: 10 INJECTION, SOLUTION INTRAVENOUS at 21:02

## 2022-01-02 RX ADMIN — GUAIFENESIN 400 MG: 200 TABLET ORAL at 12:53

## 2022-01-02 RX ADMIN — INSULIN LISPRO 2 UNITS: 100 INJECTION, SOLUTION INTRAVENOUS; SUBCUTANEOUS at 17:40

## 2022-01-02 RX ADMIN — Medication 10 MG: at 21:02

## 2022-01-02 RX ADMIN — DEXAMETHASONE SODIUM PHOSPHATE 4 MG: 4 INJECTION, SOLUTION INTRAMUSCULAR; INTRAVENOUS at 12:53

## 2022-01-02 RX ADMIN — INSULIN GLARGINE 15 UNITS: 100 INJECTION, SOLUTION SUBCUTANEOUS at 21:03

## 2022-01-02 RX ADMIN — INSULIN LISPRO 4 UNITS: 100 INJECTION, SOLUTION INTRAVENOUS; SUBCUTANEOUS at 09:52

## 2022-01-02 RX ADMIN — ZINC SULFATE 220 MG (50 MG) CAPSULE 50 MG: CAPSULE at 09:51

## 2022-01-02 RX ADMIN — THIAMINE HYDROCHLORIDE 100 MG: 100 INJECTION, SOLUTION INTRAMUSCULAR; INTRAVENOUS at 09:51

## 2022-01-02 RX ADMIN — BARICITINIB 4 MG: 2 TABLET, FILM COATED ORAL at 17:39

## 2022-01-02 RX ADMIN — FAMOTIDINE 20 MG: 10 INJECTION, SOLUTION INTRAVENOUS at 09:51

## 2022-01-02 RX ADMIN — GUAIFENESIN AND CODEINE PHOSPHATE 3 ML: 10; 100 LIQUID ORAL at 21:11

## 2022-01-02 RX ADMIN — SODIUM CHLORIDE, PRESERVATIVE FREE 10 ML: 5 INJECTION INTRAVENOUS at 09:52

## 2022-01-02 RX ADMIN — INSULIN LISPRO 8 UNITS: 100 INJECTION, SOLUTION INTRAVENOUS; SUBCUTANEOUS at 12:54

## 2022-01-02 RX ADMIN — GUAIFENESIN 400 MG: 200 TABLET ORAL at 06:21

## 2022-01-02 RX ADMIN — INSULIN LISPRO 2 UNITS: 100 INJECTION, SOLUTION INTRAVENOUS; SUBCUTANEOUS at 12:54

## 2022-01-02 RX ADMIN — INSULIN LISPRO 6 UNITS: 100 INJECTION, SOLUTION INTRAVENOUS; SUBCUTANEOUS at 21:03

## 2022-01-02 RX ADMIN — THERA TABS 1 TABLET: TAB at 09:51

## 2022-01-02 RX ADMIN — AZITHROMYCIN MONOHYDRATE 250 MG: 250 TABLET ORAL at 17:39

## 2022-01-02 RX ADMIN — ENOXAPARIN SODIUM 40 MG: 100 INJECTION SUBCUTANEOUS at 09:51

## 2022-01-02 RX ADMIN — INSULIN LISPRO 2 UNITS: 100 INJECTION, SOLUTION INTRAVENOUS; SUBCUTANEOUS at 09:51

## 2022-01-02 RX ADMIN — INSULIN LISPRO 8 UNITS: 100 INJECTION, SOLUTION INTRAVENOUS; SUBCUTANEOUS at 17:39

## 2022-01-02 RX ADMIN — FOLIC ACID 1 MG: 1 TABLET ORAL at 09:51

## 2022-01-02 RX ADMIN — Medication 2000 UNITS: at 09:51

## 2022-01-02 NOTE — PROGRESS NOTES
Hospitalist Progress Note  [facility]     Patient: Evelyn Knutson  : 1975  MRN: [id number]  Code Status: Full Code    Hospital Day: 6   Date of Service: 2022    Subjective:   Patient seen in COVID-19 unit. No current complaints. No acute distress. History reviewed. No pertinent past medical history. History reviewed. No pertinent surgical history. History reviewed. No pertinent family history. Social History     Socioeconomic History    Marital status: Single     Spouse name: Not on file    Number of children: Not on file    Years of education: Not on file    Highest education level: Not on file   Occupational History    Not on file   Tobacco Use    Smoking status: Never Smoker    Smokeless tobacco: Never Used   Substance and Sexual Activity    Alcohol use: Yes    Drug use: Not Currently    Sexual activity: Not on file   Other Topics Concern    Not on file   Social History Narrative    Not on file     Social Determinants of Health     Financial Resource Strain:     Difficulty of Paying Living Expenses: Not on file   Food Insecurity:     Worried About Running Out of Food in the Last Year: Not on file    Emy of Food in the Last Year: Not on file   Transportation Needs:     Lack of Transportation (Medical): Not on file    Lack of Transportation (Non-Medical):  Not on file   Physical Activity:     Days of Exercise per Week: Not on file    Minutes of Exercise per Session: Not on file   Stress:     Feeling of Stress : Not on file   Social Connections:     Frequency of Communication with Friends and Family: Not on file    Frequency of Social Gatherings with Friends and Family: Not on file    Attends Sikh Services: Not on file    Active Member of Clubs or Organizations: Not on file    Attends Club or Organization Meetings: Not on file    Marital Status: Not on file   Intimate Partner Violence:     Fear of Current or Ex-Partner: Not on file   Mikey Whitman Emotionally Abused: Not on file    Physically Abused: Not on file    Sexually Abused: Not on file   Housing Stability:     Unable to Pay for Housing in the Last Year: Not on file    Number of Places Lived in the Last Year: Not on file    Unstable Housing in the Last Year: Not on file       Current Facility-Administered Medications   Medication Dose Route Frequency Provider Last Rate Last Admin    [START ON 1/3/2022] dexamethasone (DECADRON) injection 2 mg  2 mg IntraVENous Q24H Mary Ray MD        magic (miracle) mouthwash  5 mL Swish & Spit 4x Daily PRN Mary Ray MD        magnesium sulfate 1000 mg in dextrose 5% 100 mL IVPB  1,000 mg IntraVENous PRN Mary aRy MD        potassium chloride (KLOR-CON M) extended release tablet 40 mEq  40 mEq Oral PRN Mary Ray MD        Or    potassium bicarb-citric acid (EFFER-K) effervescent tablet 40 mEq  40 mEq Oral PRN Mary Ray MD        Or    potassium chloride 10 mEq/100 mL IVPB (Peripheral Line)  10 mEq IntraVENous PRN Mary Ray MD        potassium chloride 20 mEq/50 mL IVPB (Central Line)  20 mEq IntraVENous PRN Juan Jose Ott MD 50 mL/hr at 01/01/22 1010 20 mEq at 01/01/22 1010    insulin lispro (HUMALOG) injection vial 0-12 Units  0-12 Units SubCUTAneous TID WC Juan Jose Ott MD   8 Units at 01/02/22 1254    insulin lispro (HUMALOG) injection vial 0-6 Units  0-6 Units SubCUTAneous Nightly Juan Jose Ott MD   3 Units at 01/01/22 2120    guaiFENesin-codeine (GUAIFENESIN AC) 100-10 MG/5ML liquid 3 mL  3 mL Oral Q4H PRN Juan Jose Ott MD   3 mL at 01/01/22 1106    sodium phosphate 10 mmol in dextrose 5 % 250 mL IVPB  10 mmol IntraVENous Once Mary Ray MD        acetaminophen (TYLENOL) tablet 650 mg  650 mg Oral Q6H PRN Mary Ray MD   650 mg at 01/01/22 0237    famotidine (PEPCID) injection 20 mg  20 mg IntraVENous BID Mary Ray MD   20 mg at 01/02/22 0951    guaiFENesin tablet 400 mg  400 mg Oral Q8H Bg Rivera MD   400 mg at 01/02/22 1253    melatonin disintegrating tablet 10 mg  10 mg Oral Nightly Bg Rivera MD   10 mg at 01/01/22 2119    Vitamin D (CHOLECALCIFEROL) tablet 2,000 Units  2,000 Units Oral Daily Bg Rivera MD   2,000 Units at 01/02/22 6625    zinc sulfate (ZINCATE) capsule 50 mg  50 mg Oral Daily Bg Rivera MD   50 mg at 01/02/22 0951    insulin glargine (LANTUS) injection vial 15 Units  15 Units SubCUTAneous Nightly Bg Rivera MD   15 Units at 01/01/22 2120    insulin lispro (HUMALOG) injection vial 2 Units  2 Units SubCUTAneous TID WC Bg Rivera MD   2 Units at 01/02/22 1254    baricitinib (OLUMIANT) tablet 4 mg  4 mg Oral Q24H Bg Rivera MD   4 mg at 01/01/22 1813    dextrose 50 % IV solution  12.5 g IntraVENous PRN Bg Rivera MD        sodium phosphate 10 mmol in dextrose 5 % 250 mL IVPB  10 mmol IntraVENous PRN Bg Rivera MD        Or    sodium phosphate 15 mmol in dextrose 5 % 250 mL IVPB  15 mmol IntraVENous PRALLIE Rivera MD   Stopped at 12/30/21 2026    Or    sodium phosphate 20 mmol in dextrose 5 % 500 mL IVPB  20 mmol IntraVENous PRALLIE Rivera MD   Stopped at 12/30/21 0513    dextrose 5 % and 0.45 % sodium chloride infusion   IntraVENous Continuous PRALLIE Rivera MD   Stopped at 12/30/21 0333    enoxaparin (LOVENOX) injection 40 mg  40 mg SubCUTAneous Daily Bg Rivera MD   40 mg at 01/02/22 1586    multivitamin 1 tablet  1 tablet Oral Daily Bg Rivera MD   1 tablet at 01/02/22 0951    glucose (GLUTOSE) 40 % oral gel 15 g  15 g Oral PRN Bg Rivera MD        glucagon (rDNA) injection 1 mg  1 mg IntraMUSCular PRN Bg Rivera MD        dextrose 5 % solution  100 mL/hr IntraVENous PRN Bg Rivera MD        sodium chloride flush 0.9 % injection 5-40 mL  5-40 mL IntraVENous 2 times per day Bg Rivera MD   10 mL at 01/02/22 0952    sodium chloride flush 0.9 % injection 5-40 mL  5-40 mL IntraVENous PRN Sindy Abernathy MD        0.9 % sodium chloride infusion  25 mL IntraVENous PRN Sindy Abernathy  mL/hr at 12/31/21 0600 Rate Verify at 12/31/21 0600    potassium chloride 20 mEq/50 mL IVPB (Central Line)  20 mEq IntraVENous PRN John Mancia MD 50 mL/hr at 01/01/22 0702 20 mEq at 01/01/22 9949    0.9 % sodium chloride infusion  25 mL IntraVENous PRN John Mancia MD        thiamine (B-1) injection 100 mg  100 mg IntraVENous Daily Alejandra Sprague DO   100 mg at 91/23/85 5013    folic acid (FOLVITE) tablet 1 mg  1 mg Oral Daily John Mancia MD   1 mg at 01/02/22 2595    acetaminophen (TYLENOL) suppository 650 mg  650 mg Rectal Q6H PRN John Mancia MD        azithromycin (ZITHROMAX) 250 mg in dextrose 5 % 250 mL IVPB  250 mg IntraVENous Q24H John Mancia MD   Stopped at 01/01/22 2244    ondansetron (ZOFRAN-ODT) disintegrating tablet 4 mg  4 mg Oral Q8H PRN John Mancia MD   4 mg at 12/30/21 1714    Or    ondansetron (ZOFRAN) injection 4 mg  4 mg IntraVENous Q6H PRN John Mancia MD        polyethylene glycol [facility]) packet 17 g  17 g Oral Daily PRN John Mancia MD             dextrose 5 % and 0.45 % NaCl Stopped (12/30/21 0333)    dextrose      sodium chloride 100 mL/hr at 12/31/21 0600    sodium chloride          Objective:   BP (!) 137/95   Pulse 110   Temp 96.9 °F (36.1 °C) (Temporal)   Resp 16   Ht 5' 9\" (1.753 m)   Wt 182 lb (82.6 kg)   SpO2 96%   BMI 26.88 kg/m²     In an effort to reduce COVID-19 exposure, patient seen from doorway and case discussed in detail with unit staff    Recent Labs     12/31/21  0323 01/01/22  0240 01/02/22  0620   WBC 11.1* 6.4 6.4   RBC 4.31* 4.11* 4.42*   HGB 12.7* 12.4* 13.5*   HCT 37.7* 36.1* 40.1*   MCV 87.5 87.8 90.7   MCH 29.5 30.2 30.5   MCHC 33.7 34.3 33.7    128* 163     Recent Labs     12/31/21  0323 01/01/22  0240 01/02/22  6491  137 134*   K 3.5 2.9* 3.9   ANIONGAP 11 11 12    99 96*   CO2 24 27 26   BUN 12 10 15   CREATININE 1.0 0.8 0.8   GLUCOSE 230* 206* 205*   CALCIUM 7.9* 8.0* 8.5*     Recent Labs     12/31/21  0323 01/01/22  0240 01/02/22  0620   MG 2.1 2.1 2.2     Recent Labs     12/31/21  0323 01/01/22  0240 01/02/22  0620   AST 18 27 31   ALT 20 25 30   BILITOT 0.6 0.6 0.5   ALKPHOS 95 89 87     No results for input(s): PH, PO2, PCO2, HCO3, BE, O2SAT in the last 72 hours. No results for input(s): TROPONINI in the last 72 hours. No results for input(s): INR in the last 72 hours. No results for input(s): LACTA in the last 72 hours. Intake/Output Summary (Last 24 hours) at 1/2/2022 1320  Last data filed at 1/2/2022 1018  Gross per 24 hour   Intake 1100 ml   Output 4125 ml   Net -3025 ml       No results found.      Assessment and Plan:   COVID-19 bilateral pneumonia  Dexamethasone  Baricitinib  Adjuvant therapy  Encouraged self proning  Incentive spirometry  Unvaccinated     Acute hypoxemic respiratory failure  Resolved  Secondary to above     DKA  Resolved  Insulin gtt transitioned to SC insulin  IVF administered  Frequent Accu-Cheks/electrolytes    New onset DM  HbA1c 13.1  Counseled  Meds as above  Aggressive risk factor modifications  Routine outpatient follow-up with PCP     Concern for alcohol withdrawal  Resolving  CIWA protocol  Precedex gtt since weaned off  Daily thiamine/folic acid/MVI  Follow electrolytes  Counseled  Social work     DVT prophylaxis  Latoya Hood MD   1/2/2022 1:20 PM

## 2022-01-03 VITALS
HEART RATE: 117 BPM | WEIGHT: 182 LBS | BODY MASS INDEX: 26.96 KG/M2 | DIASTOLIC BLOOD PRESSURE: 99 MMHG | SYSTOLIC BLOOD PRESSURE: 121 MMHG | HEIGHT: 69 IN | RESPIRATION RATE: 18 BRPM | TEMPERATURE: 97.3 F | OXYGEN SATURATION: 92 %

## 2022-01-03 LAB
ALBUMIN SERPL-MCNC: 2.8 G/DL (ref 3.5–5.2)
ALP BLD-CCNC: 81 U/L (ref 40–130)
ALT SERPL-CCNC: 30 U/L (ref 5–41)
ANION GAP SERPL CALCULATED.3IONS-SCNC: 11 MMOL/L (ref 7–19)
AST SERPL-CCNC: 30 U/L (ref 5–40)
BASOPHILS ABSOLUTE: 0 K/UL (ref 0–0.2)
BASOPHILS RELATIVE PERCENT: 0.4 % (ref 0–1)
BILIRUB SERPL-MCNC: 0.5 MG/DL (ref 0.2–1.2)
BUN BLDV-MCNC: 18 MG/DL (ref 6–20)
CALCIUM SERPL-MCNC: 8.5 MG/DL (ref 8.6–10)
CHLORIDE BLD-SCNC: 94 MMOL/L (ref 98–111)
CO2: 26 MMOL/L (ref 22–29)
CREAT SERPL-MCNC: 0.7 MG/DL (ref 0.5–1.2)
EOSINOPHILS ABSOLUTE: 0 K/UL (ref 0–0.6)
EOSINOPHILS RELATIVE PERCENT: 0.5 % (ref 0–5)
GFR AFRICAN AMERICAN: >59
GFR NON-AFRICAN AMERICAN: >60
GLUCOSE BLD-MCNC: 214 MG/DL (ref 70–99)
GLUCOSE BLD-MCNC: 216 MG/DL (ref 74–109)
GLUCOSE BLD-MCNC: 349 MG/DL (ref 70–99)
HCT VFR BLD CALC: 39.4 % (ref 42–52)
HEMOGLOBIN: 13 G/DL (ref 14–18)
IMMATURE GRANULOCYTES #: 0.2 K/UL
LYMPHOCYTES ABSOLUTE: 2.9 K/UL (ref 1.1–4.5)
LYMPHOCYTES RELATIVE PERCENT: 34 % (ref 20–40)
MAGNESIUM: 2.1 MG/DL (ref 1.6–2.6)
MCH RBC QN AUTO: 29.7 PG (ref 27–31)
MCHC RBC AUTO-ENTMCNC: 33 G/DL (ref 33–37)
MCV RBC AUTO: 90.2 FL (ref 80–94)
MONOCYTES ABSOLUTE: 1.9 K/UL (ref 0–0.9)
MONOCYTES RELATIVE PERCENT: 22.7 % (ref 0–10)
MYCOPLASMA PNEUMONIAE IGG: 0.03 U/L
MYCOPLASMA PNEUMONIAE IGM: 0.19 U/L
NEUTROPHILS ABSOLUTE: 3.4 K/UL (ref 1.5–7.5)
NEUTROPHILS RELATIVE PERCENT: 40.1 % (ref 50–65)
PDW BLD-RTO: 12.8 % (ref 11.5–14.5)
PERFORMED ON: ABNORMAL
PERFORMED ON: ABNORMAL
PLATELET # BLD: 206 K/UL (ref 130–400)
PMV BLD AUTO: 11 FL (ref 9.4–12.4)
POTASSIUM SERPL-SCNC: 3.6 MMOL/L (ref 3.5–5)
RBC # BLD: 4.37 M/UL (ref 4.7–6.1)
SODIUM BLD-SCNC: 131 MMOL/L (ref 136–145)
TOTAL PROTEIN: 6.4 G/DL (ref 6.6–8.7)
WBC # BLD: 8.4 K/UL (ref 4.8–10.8)

## 2022-01-03 PROCEDURE — 82947 ASSAY GLUCOSE BLOOD QUANT: CPT

## 2022-01-03 PROCEDURE — 94761 N-INVAS EAR/PLS OXIMETRY MLT: CPT

## 2022-01-03 PROCEDURE — 6370000000 HC RX 637 (ALT 250 FOR IP): Performed by: INTERNAL MEDICINE

## 2022-01-03 PROCEDURE — 83735 ASSAY OF MAGNESIUM: CPT

## 2022-01-03 PROCEDURE — 6370000000 HC RX 637 (ALT 250 FOR IP): Performed by: HOSPITALIST

## 2022-01-03 PROCEDURE — 2500000003 HC RX 250 WO HCPCS: Performed by: INTERNAL MEDICINE

## 2022-01-03 PROCEDURE — 6360000002 HC RX W HCPCS: Performed by: INTERNAL MEDICINE

## 2022-01-03 PROCEDURE — 2580000003 HC RX 258: Performed by: INTERNAL MEDICINE

## 2022-01-03 PROCEDURE — 80053 COMPREHEN METABOLIC PANEL: CPT

## 2022-01-03 PROCEDURE — 85025 COMPLETE CBC W/AUTO DIFF WBC: CPT

## 2022-01-03 RX ORDER — INSULIN GLARGINE 100 [IU]/ML
20 INJECTION, SOLUTION SUBCUTANEOUS NIGHTLY
Qty: 1 EACH | Refills: 0 | Status: SHIPPED | OUTPATIENT
Start: 2022-01-03 | End: 2022-02-02

## 2022-01-03 RX ORDER — AZITHROMYCIN 250 MG/1
250 TABLET, FILM COATED ORAL DAILY
Qty: 5 TABLET | Refills: 0 | Status: SHIPPED | OUTPATIENT
Start: 2022-01-04 | End: 2022-01-09

## 2022-01-03 RX ORDER — AZITHROMYCIN 250 MG/1
250 TABLET, FILM COATED ORAL DAILY
Qty: 10 TABLET | Refills: 0 | Status: SHIPPED | OUTPATIENT
Start: 2022-01-04 | End: 2022-01-03

## 2022-01-03 RX ADMIN — GUAIFENESIN 400 MG: 200 TABLET ORAL at 06:23

## 2022-01-03 RX ADMIN — SODIUM CHLORIDE, PRESERVATIVE FREE 10 ML: 5 INJECTION INTRAVENOUS at 08:30

## 2022-01-03 RX ADMIN — INSULIN LISPRO 4 UNITS: 100 INJECTION, SOLUTION INTRAVENOUS; SUBCUTANEOUS at 08:34

## 2022-01-03 RX ADMIN — ENOXAPARIN SODIUM 40 MG: 100 INJECTION SUBCUTANEOUS at 08:30

## 2022-01-03 RX ADMIN — AZITHROMYCIN MONOHYDRATE 250 MG: 250 TABLET ORAL at 08:30

## 2022-01-03 RX ADMIN — THIAMINE HYDROCHLORIDE 100 MG: 100 INJECTION, SOLUTION INTRAMUSCULAR; INTRAVENOUS at 08:30

## 2022-01-03 RX ADMIN — INSULIN LISPRO 2 UNITS: 100 INJECTION, SOLUTION INTRAVENOUS; SUBCUTANEOUS at 08:34

## 2022-01-03 RX ADMIN — FAMOTIDINE 20 MG: 10 INJECTION, SOLUTION INTRAVENOUS at 08:30

## 2022-01-03 RX ADMIN — FOLIC ACID 1 MG: 1 TABLET ORAL at 08:30

## 2022-01-03 RX ADMIN — INSULIN LISPRO 2 UNITS: 100 INJECTION, SOLUTION INTRAVENOUS; SUBCUTANEOUS at 12:35

## 2022-01-03 RX ADMIN — THERA TABS 1 TABLET: TAB at 08:30

## 2022-01-03 RX ADMIN — Medication 2000 UNITS: at 08:30

## 2022-01-03 RX ADMIN — ZINC SULFATE 220 MG (50 MG) CAPSULE 50 MG: CAPSULE at 08:30

## 2022-01-03 RX ADMIN — INSULIN LISPRO 8 UNITS: 100 INJECTION, SOLUTION INTRAVENOUS; SUBCUTANEOUS at 12:34

## 2022-01-03 NOTE — DISCHARGE SUMMARY
[place], [place], [address]    DEPARTMENT OF HOSPITALIST MEDICINE      DISCHARGE SUMMARY:      PATIENT NAME:  Latoya James  :  1975  MRN:  [id number]    Admission Date:   2021 12:27 AM Attending: Rodrigo Carmona MD   Discharge Date:   1/3/2022              PCP: No primary care provider on file. 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 *         Last dated Assessment and Plan . ..2022      CUMULATIVE  HOSPITAL  COURSE  AND  TREATMENT: Tahir Randolph was admitted on [phone] with complaint of severe lower back pain. He denied any injury. He also complained of nausea vomiting and fever. Patient was evaluated in the ER and found of a in DKA type I, with no prior history he was also found to be Covid positive. Aggressive management of his blood sugars was implemented and he was started on Covid protocol. Patient is on room air today and required no supplemental oxygen on home O2 eval. he desires to establish with a PCP in [place] and see an endocrinologist in [address] [place] [address] as soon as he can get into see them. He has a family member that comes there. He knows how to give himself insulin. DME order implemented for testing supplies. He is to see his PCP within 2 days of discharge.   We will go home on Eliquis 2.5 twice daily for 1 month,  Complete complete his 10 days of azithromycin and Lantus 20 units daily      OBJECTIVE:  BP (!) 121/99   Pulse 117   Temp 97.3 °F (36.3 °C) (Temporal)   Resp 18   Ht 5' 9\" (1.753 m)   Wt 182 lb (82.6 kg)   SpO2 92%   BMI 26.88 kg/m²       Heart: RRR   Lungs: Bilateral fair air entry   Abdomen: Soft, non-tender   Extremities: No edema   Neurologic: Alert and oriented   Skin: Warm and dry          Laboratory Data:  Recent Labs     01/01/22  0240 01/02/22  0620 01/03/22  0625   WBC 6.4 6.4 8.4   HGB 12.4* 13.5* 13.0*   * 163 206     Recent Labs     01/01/22  0240 01/02/22  0620 01/03/22  0625    134* 131*   K 2.9* 3.9 3.6   CL 99 96* 94*   CO2 27 26 26   BUN 10 15 18   CREATININE 0.8 0.8 0.7   GLUCOSE 206* 205* 216*     Recent Labs     01/01/22  0240 01/02/22  0620 01/03/22  0625   AST 27 31 30   ALT 25 30 30   BILITOT 0.6 0.5 0.5   ALKPHOS 89 87 81     Troponin T: No results for input(s): TROPONINI in the last 72 hours. Pro-BNP: No results for input(s): BNP in the last 72 hours. INR: No results for input(s): INR in the last 72 hours. UA:No results for input(s): NITRITE, COLORU, PHUR, LABCAST, WBCUA, RBCUA, MUCUS, TRICHOMONAS, YEAST, BACTERIA, CLARITYU, SPECGRAV, LEUKOCYTESUR, UROBILINOGEN, BILIRUBINUR, BLOODU, GLUCOSEU, AMORPHOUS in the last 72 hours. Invalid input(s): Devonte Dietz  A1C: No results for input(s): LABA1C in the last 72 hours. ABG:No results for input(s): PHART, ICF5QIG, PO2ART, OYC6HXF, BEART, HGBAE, R2LXIWAB, CARBOXHGBART in the last 72 hours. Impressions of imaging performed in 48 hours before discharge:    No results found.         Medication List      START taking these medications    apixaban 2.5 MG Tabs tablet  Commonly known as: Eliquis  Take 1 tablet by mouth 2 times daily     azithromycin 250 MG tablet  Commonly known as: ZITHROMAX  Take 1 tablet by mouth daily for 5 days  Start taking on: January 4, 2022     insulin glargine 100 UNIT/ML injection vial  Commonly known as: LANTUS  Inject 20 Units into the skin nightly           Where to Get Your Medications      These medications were sent to [place] [id number] [phone] [address]    Phone: [phone]   · apixaban 2.5 MG Tabs tablet  · azithromycin 250 MG tablet  · insulin glargine 100 UNIT/ML injection vial           ISSUES TO BE ADDRESSED AT HOSPITAL FOLLOW-UP VISIT:    Advised patient to follow-up closely with PCP upon discharge for management of chronic medical issues  Please see the detailed discharge directions delivered from earlier today! Condition on Discharge: stable  Discharge Disposition: Home    Recommended Follow Up:  [address]    p [phone]  On 1/10/2022  8:00 a.m. with Randi Cooks, N.P.  ( Call [facility] for quarantine release date, if need longer  just call the office and reschedule )    Yecenia Johnson DO  [address] [place] [address]  [phone]    In 2 days      Followup Appointments Scheduled at Time of Discharge:  No future appointments. Discharge Instructions:   Please see the discharge paperwork. Patient was seen at bedside today, and the examination shows improvement since yesterday. Detailed discharge directions delivered to the patient by myself and our nursing staff, who verbalizes understanding and is very happy and satisfied with the plan. Patient has been advised to continue all medications as prescribed and advised, and f/u with PCP within 1 week. Patient is stable from medical standpoint to be discharged. Total time spent during patient evaluation and assessment, discussion with the nurse/family, addressing discharge medications/scripts and coordination of care for safe discharge was in excess of 35 minutes.       Signed Electronically:    DELMA Rebollar CNP  4:10 PM 1/3/2022

## 2022-01-03 NOTE — PROGRESS NOTES
Nutrition Assessment     Type and Reason for Visit: Initial,RD Nutrition Re-Screen/LOS    Nutrition Recommendations/Plan:   DM diet education handouts sent to pt on lunch tray 1/3/2022. Nutrition Assessment:  LOS day 7. Pt adequately nourished AEB adequate po intakes during admission. Pt admitted with DKA, accuchecks 205-424, hgb A1C 13.1%. Sent Pt education handouts up with pt's lunch tray with contact information. Malnutrition Assessment:  Malnutrition Status: No malnutrition    Current Nutrition Therapies:    ADULT DIET; Regular; 4 carb choices (60 gm/meal); COVID    Anthropometric Measures:  · Height: 5' 9\" (175.3 cm)  · Current Body Wt: 182 lb (82.6 kg)   · BMI: 26.9    Nutrition Diagnosis:   · Altered nutrition-related lab values related to food and nutrition related knowledge deficit as evidenced by lab values    Nutrition Interventions:   Food and/or Nutrient Delivery:  Continue Current Diet  Nutrition Education/Counseling:  Education initiated   Coordination of Nutrition Care:  Continue to monitor while inpatient    Goals:  Po intake remains adequate.        Nutrition Monitoring and Evaluation:   Behavioral-Environmental Outcomes:  Knowledge or Skill   Food/Nutrient Intake Outcomes:  Food and Nutrient Intake  Physical Signs/Symptoms Outcomes:  Biochemical Data,Weight     Discharge Planning:    Continue current diet,Recommend pursue outpatient diabetes education     Electronically signed by Tunde Gross MS, RD, LD on 1/3/22 at 11:06 AM CST    Contact: [phone]

## 2022-01-04 ENCOUNTER — CARE COORDINATION (OUTPATIENT)
Dept: CASE MANAGEMENT | Age: 47
End: 2022-01-04

## 2022-01-04 NOTE — CARE COORDINATION
[facility] Transitions Initial Follow Up Call    Call within 2 business days of discharge: Yes    Patient: Tracy Martinez Patient : 1975   MRN: <[id number]>  Reason for Admission: COVID+, DKA, concern for ETOH abuse  Discharge Date: 1/3/22 RARS: Readmission Risk Score: 6.8 ( )      Last Discharge [facility]       Complaint Diagnosis Description Type Department Provider    22 Back Pain Diabetic ketoacidosis without coma associated with other specified diabetes mellitus ([facility] [address] . .. ED to Hosp-Admission (Discharged) (ADMITTED) [facility] Efrain Porter MD; Lyubov Garcia. .. Spoke with: COURTNEY    Facility: [facility]    Attempted to reach patient via phone for initial post hospital transition call. Unable to leave VM d/t patient VM not set up. Will schedule outreach attempt for another time. Arely Vieyra LPN [address] [place] [address]  Care Transitions  [phone]    Care Transitions 24 Hour Call    Care Transitions Interventions         Follow Up  No future appointments.     Arely Vieyra LPN

## 2022-01-05 ENCOUNTER — CARE COORDINATION (OUTPATIENT)
Dept: CASE MANAGEMENT | Age: 47
End: 2022-01-05

## 2022-01-05 NOTE — CARE COORDINATION
[facility] Transitions Initial Follow Up Call    Call within 2 business days of discharge: Yes    Patient: Stephen Contreras Patient : 1975   MRN: <[id number]>  Reason for Admission: COVID+, DKA, concern for ETOH abuse  Discharge Date: 1/3/22 RARS: Readmission Risk Score: 6.8 ( )      Last Discharge [facility]       Complaint Diagnosis Description Type Department Provider    22 Back Pain Diabetic ketoacidosis without coma associated with other specified diabetes mellitus ([facility] [address] . .. ED to Hosp-Admission (Discharged) (ADMITTED) [facility] Efrain Porter MD; Rodrigo Oglesby. .. Spoke with: COURTNEY    Facility: [facility]    Second and final attempt to reach patient via phone for initial post hospital transition call. Unable to leave VM as VM box has not been set up. Episode ended d/t unsuccessful contact. Darian Regalado  [facility]  Care Transitions  [phone]    Care Transitions 24 Hour Call    Care Transitions Interventions         Follow Up  No future appointments.     Darian Regalado LPN

## 2022-01-15 ASSESSMENT — ENCOUNTER SYMPTOMS
BACK PAIN: 1
COLOR CHANGE: 0
COUGH: 0
SHORTNESS OF BREATH: 1
RHINORRHEA: 0
ABDOMINAL PAIN: 0
NAUSEA: 1
EYE DISCHARGE: 0
VOMITING: 1

## 2022-01-15 NOTE — ED PROVIDER NOTES
[facility] [address]  eMERGENCY dEPARTMENT eNCOUnter      Pt Name: Omar Maynard  MRN: [id number]  [place] 1975  Date of evaluation: 12/27/2021  Provider: Nikia Chen MD    [address] [place] [address]       Chief Complaint   Patient presents with    Back Pain         HISTORY OF PRESENT ILLNESS   (Location/Symptom, Timing/Onset,Context/Setting, Quality, Duration, Modifying Factors, Severity)  Note limiting factors. Omar Maynard is a 40 y.o. male who presents to the emergency department with back pain. Patient points to mid back as source of pain. Patient states that he has not had pain in this area or. Associated symptoms include generalized weakness, and shortness of breath, and vomiting. Patient denies cough, congestion, fever, chest pain, diarrhea, or black or bloody stools. HPI    NursingNotes were reviewed. REVIEW OF SYSTEMS    (2-9 systems for level 4, 10 or more for level 5)     Review of Systems   Constitutional: Negative for chills and fever. HENT: Negative for congestion and rhinorrhea. Eyes: Negative for discharge. Respiratory: Positive for shortness of breath. Negative for cough. Cardiovascular: Negative for chest pain and palpitations. Gastrointestinal: Positive for nausea and vomiting. Negative for abdominal pain. Genitourinary: Negative for difficulty urinating and dysuria. Musculoskeletal: Positive for back pain. Negative for neck pain. Skin: Negative for color change and pallor. Neurological: Positive for weakness (Generalized). Negative for syncope and light-headedness. Psychiatric/Behavioral: Negative for agitation and confusion. All other systems reviewed and are negative. PAST MEDICALHISTORY   History reviewed. No pertinent past medical history. SURGICAL HISTORY     History reviewed. No pertinent surgical history.       CURRENT MEDICATIONS     Discharge Medication List as of 1/3/2022  4:36 PM          ALLERGIES     Ativan [lorazepam]    FAMILY HISTORY History reviewed. No pertinent family history. SOCIAL HISTORY       Social History     Socioeconomic History    Marital status: Single     Spouse name: None    Number of children: None    Years of education: None    Highest education level: None   Occupational History    None   Tobacco Use    Smoking status: Never Smoker    Smokeless tobacco: Never Used   Substance and Sexual Activity    Alcohol use: Yes    Drug use: Not Currently    Sexual activity: None   Other Topics Concern    None   Social History Narrative    None     Social Determinants of Health     Financial Resource Strain:     Difficulty of Paying Living Expenses: Not on file   Food Insecurity:     Worried About Running Out of Food in the Last Year: Not on file    Emy of Food in the Last Year: Not on file   Transportation Needs:     Lack of Transportation (Medical): Not on file    Lack of Transportation (Non-Medical):  Not on file   Physical Activity:     Days of Exercise per Week: Not on file    Minutes of Exercise per Session: Not on file   Stress:     Feeling of Stress : Not on file   Social Connections:     Frequency of Communication with Friends and Family: Not on file    Frequency of Social Gatherings with Friends and Family: Not on file    Attends Taoist Services: Not on file    Active Member of [address] [place] [address] or Organizations: Not on file    Attends Club or Organization Meetings: Not on file    Marital Status: Not on file   Intimate Partner Violence:     Fear of Current or Ex-Partner: Not on file    Emotionally Abused: Not on file    Physically Abused: Not on file    Sexually Abused: Not on file   Housing Stability:     Unable to Pay for Housing in the Last Year: Not on file    Number of [place] in the Last Year: Not on file    Unstable Housing in the Last Year: Not on file       SCREENINGS    [place] Coma Scale  Eye Opening: Spontaneous  Best Verbal Response: Oriented  Best Motor Response: Obeys commands  Golden Coma Scale Score: 15        PHYSICAL EXAM    (up to 7 for level 4, 8 or more for level 5)     ED Triage Vitals   BP Temp Temp Source Pulse Resp SpO2 Height Weight   12/27/21 0032 12/27/21 0032 12/27/21 1816 12/27/21 0032 12/27/21 0032 12/27/21 0032 12/27/21 0032 12/27/21 0032   (!) 140/88 98.9 °F (37.2 °C) Temporal 130 22 94 % 5' 9\" (1.753 m) 170 lb (77.1 kg)       Physical Exam  Vitals and nursing note reviewed. Constitutional:       General: He is not in acute distress. Appearance: He is ill-appearing. HENT:      Head: Normocephalic and atraumatic. Right Ear: External ear normal.      Left Ear: External ear normal.      Nose: Congestion present. Mouth/Throat:      Mouth: Mucous membranes are dry. Pharynx: Oropharynx is clear. No oropharyngeal exudate. Eyes:      General: No scleral icterus. Conjunctiva/sclera: Conjunctivae normal.      Pupils: Pupils are equal, round, and reactive to light. Cardiovascular:      Rate and Rhythm: Regular rhythm. Tachycardia present. Pulses: Normal pulses. Heart sounds: Normal heart sounds. Pulmonary:      Effort: Pulmonary effort is normal.      Breath sounds: Rales present. Comments: Increased respiratory rate  Abdominal:      General: Bowel sounds are normal.      Palpations: Abdomen is soft. Tenderness: There is no abdominal tenderness. There is no guarding. Musculoskeletal:         General: No tenderness or deformity. Cervical back: Neck supple. No rigidity. Skin:     General: Skin is warm and dry. Capillary Refill: Capillary refill takes less than 2 seconds. Coloration: Skin is not jaundiced. Neurological:      General: No focal deficit present. Mental Status: He is alert and oriented to person, place, and time. Mental status is at baseline.       Coordination: Coordination normal.   Psychiatric:         Mood and Affect: Mood normal.         Behavior: Behavior normal. DIAGNOSTIC RESULTS     EKG: All EKG's areinterpreted by the Emergency Department Physician who either signs or Co-signs this chart in the absence of a cardiologist.    EKG dated 12/27/2021 at 0 1:19 AM: Sinus tachycardia, rate 132. Left atrial enlargement. Nonspecific T wave abnormalities in multiple leads. QTc 526. RADIOLOGY:  Non-plain film images such as CT, Ultrasound and MRI are read by the radiologist. Plain radiographic images are visualized and preliminarily interpreted bythe emergency physician with the below findings:      XR CHEST PORTABLE   Final Result   1. Right-sided PICC with tip overlying the SVC. 2.  No significant change in bilateral airspace opacities appear   Signed by Dr Jyotsna Welch (2-3 VIEWS)   Final Result   Minimal degenerative disease without acute osseous   abnormality. Signed by Dr Knight Puls      CTA [address]   Final Result   1. No evidence of pulmonary embolus. 2.  Patchy bilateral groundglass opacity, likely representing COVID 19   pneumonia. 3.  7 cm RIGHT adrenal gland myelolipoma. 4.  Hepatic steatosis. Findings in agreement with the emergent findings from the initial   [facility] preliminary report.    Signed by Dr Salina Mccormick:  Labs Reviewed   RESPIRATORY PANEL, MOLECULAR, WITH COVID-19 - Abnormal; Notable for the following components:       Result Value    SARS-CoV-2, PCR DETECTED (*)     All other components within normal limits    Narrative:     Pako Roberts tel. ,  Microbiology results called to and read back by Liyah Roe RN in ED,  12/27/2021 03:09, by [facility]   CBC WITH AUTO DIFFERENTIAL - Abnormal; Notable for the following components:    WBC 16.1 (*)     Hemoglobin 18.2 (*)     Hematocrit 54.8 (*)     Neutrophils % 85.3 (*)     Lymphocytes % 7.6 (*)     Neutrophils Absolute 13.7 (*)     All other components within normal limits   COMPREHENSIVE METABOLIC PANEL - Abnormal; Notable for the following components:    Sodium 128 (*)     Chloride 93 (*)     CO2 7 (*)     Anion Gap 28 (*)     Glucose 372 (*)     Alkaline Phosphatase 131 (*)     All other components within normal limits    Narrative:     CALL  Cheung  KLEKENNY tel. ,  Chemistry results called to and read back by Ventura Harrell in ED, 12/27/2021  01:29, by MONIQUE   LIPASE - Abnormal; Notable for the following components:    Lipase 12 (*)     All other components within normal limits   URINE RT REFLEX TO CULTURE - Abnormal; Notable for the following components:    Blood, Urine SMALL (*)     Protein,  (*)     All other components within normal limits   MICROSCOPIC URINALYSIS - Abnormal; Notable for the following components:    Coarse Casts, UA 6-10 (*)     Mucus, UA Rare (*)     Bacteria, UA Rare (*)     Amorphous, UA 1+ (*)     Crystals, UA NEG (*)     All other components within normal limits   HEMOGLOBIN A1C - Abnormal; Notable for the following components:    Hemoglobin A1C 12.3 (*)     All other components within normal limits   BLOOD GAS, ARTERIAL - Abnormal; Notable for the following components:    pH, Arterial 6.970 (*)     pCO2, Arterial 15.0 (*)     HCO3, Arterial 3.5 (*)     Base Excess, Arterial -26.7 (*)     All other components within normal limits    Narrative:     CALL  Cheung  Ruchi Chen RN ER, 12/27/2021 04:17, by Gael Savage   BETA-HYDROXYBUTYRATE - Abnormal; Notable for the following components:    Beta-Hydroxybutyrate 80.7 (*)     All other components within normal limits   COMPREHENSIVE METABOLIC PANEL - Abnormal; Notable for the following components:    CO2 7 (*)     Anion Gap 22 (*)     Glucose 342 (*)     Calcium 7.7 (*)     Albumin 3.1 (*)     All other components within normal limits    Narrative:     CALL  Cheung  DAVID tel. ,  Chemistry results called to and read back by marry hook kled, 12/27/2021  14:51, by ANETTE   BLOOD GAS, ARTERIAL - Abnormal; Notable for the following components:    pH, Arterial 6.970 (*)     pCO2, Arterial 13.0 (*)     pO2, Arterial 78.0 (*)     HCO3, Arterial 3.0 (*)     Base Excess, Arterial -27.1 (*)     All other components within normal limits    Narrative:     Rosie Edwards tel. ,  MARY YEE RN, 12/27/2021 05:47, by [address] - Abnormal; Notable for the following components:    Sodium 135 (*)     CO2 8 (*)     Glucose 350 (*)     CREATININE 1.4 (*)     GFR Non-African American 55 (*)     Calcium 8.1 (*)     All other components within normal limits    Narrative:     CALL  Cheung  [facility] tel. ,  Chemistry results called to and read back by Chidi Hardwick RN/CCU, 12/27/2021 19:31,  by [facility] SCREVEN  Collection has been rescheduled by [facility] at 12/27/2021 17:57 Reason: Add  Collection has been rescheduled by [facility] at 12/27/2021 18:28 Reason:   Nurse collected  Collection has been rescheduled by [facility] at 12/27/2021 18:59 Reason:   Patient is a nurse collect   PHOSPHORUS - Abnormal; Notable for the following components:    Phosphorus 0.8 (*)     All other components within normal limits    Narrative:     Collection has been rescheduled by [facility] at 12/27/2021 17:57 Reason: Add  Collection has been rescheduled by [facility] at 12/27/2021 18:28 Reason:   Nurse collected  Collection has been rescheduled by [facility] at 12/27/2021 18:59 Reason:   Patient is a nurse collect   BLOOD GAS, ARTERIAL - Abnormal; Notable for the following components:    pH, Arterial 7.090 (*)     pCO2, Arterial 13.0 (*)     pO2, Arterial 66.0 (*)     HCO3, Arterial 3.9 (*)     Base Excess, Arterial -23.4 (*)     All other components within normal limits    Narrative:     CALL  Cheung  DAVID tel. ,  Troy alan rn er, 12/27/2021 07:05, by  [address] - Abnormal; Notable for the following components:    Sed Rate 47 (*)     All other components within normal limits   C-REACTIVE PROTEIN - Abnormal; Notable for the following components:    CRP 15.78 (*)     All other components within normal limits    Narrative:     Karen Ruiz DAVID tel. ,  Chemistry results called to and read back by marry muniz, 12/27/2021  14:51, by Chava Kuvd - Abnormal; Notable for the following components:    Sodium 135 (*)     CO2 6 (*)     Anion Gap 25 (*)     Glucose 307 (*)     Calcium 7.3 (*)     All other components within normal limits    Narrative:     Paddavy Escobedo tel. ,  Chemistry results called to and read back by Meño Hartley, 12/27/2021  10:27, by Jhonathan Harper   PHOSPHORUS - Abnormal; Notable for the following components:    Phosphorus 1.9 (*)     All other components within normal limits    Narrative:     Paddavy Escobedo tel. ,  Chemistry results called to and read back by Meño Hartley, 12/27/2021  10:27, by MEREDITH   BLOOD GAS, ARTERIAL - Abnormal; Notable for the following components:    pH, Arterial 7.080 (*)     pCO2, Arterial 19.0 (*)     pO2, Arterial 56.0 (*)     HCO3, Arterial 5.6 (*)     Base Excess, Arterial -22.5 (*)     All other components within normal limits    Narrative:     Patricia Vieyra tel. ,  John Valdivia RN, 12/27/2021 18:36, by NICOLE   BLOOD GAS, ARTERIAL - Abnormal; Notable for the following components:    pH, Arterial 7.160 (*)     pCO2, Arterial 21.0 (*)     pO2, Arterial 64.0 (*)     HCO3, Arterial 7.5 (*)     Base Excess, Arterial -19.1 (*)     All other components within normal limits    Narrative:     Patricia Vieyra tel. ,  Gold Lala RN, 12/27/2021 21:51, by NICOLE   APTT - Abnormal; Notable for the following components:    aPTT 194.1 (*)     All other components within normal limits    Narrative:     CALL  Jonatan GARSIA tel. ,  Coag results called to and read back by [facility] RN in CCU, 12/28/2021 06:55, by  [facility]   CBC WITH AUTO DIFFERENTIAL - Abnormal; Notable for the following components:    WBC 16.7 (*)     Neutrophils % 85.3 (*)     Lymphocytes % 5.6 (*)     Neutrophils Absolute 14.3 (*)     Lymphocytes Absolute 0.9 (*)     Monocytes Absolute 1.30 (*)     All other components within normal limits   PHOSPHORUS - Abnormal; Notable for the following components:    Phosphorus 0.6 (*)     All other components within normal limits   PROTIME-INR - Abnormal; Notable for the following components:    Protime 29.5 (*)     INR 2.86 (*)     All other components within normal limits   BRAIN NATRIURETIC PEPTIDE - Abnormal; Notable for the following components:    Pro-BNP 4,357 (*)     All other components within normal limits   BASIC METABOLIC PANEL - Abnormal; Notable for the following components:    Potassium 2.2 (*)     Chloride 113 (*)     CO2 6 (*)     Glucose 269 (*)     CREATININE 1.3 (*)     GFR Non-African American 60 (*)     Calcium 7.6 (*)     All other components within normal limits    Narrative:     CALL  [employer] tel. ,  Chemistry results called to and read back by Maribell Chan in CCU, 12/28/2021  01:31, by MONIQUE   PHOSPHORUS - Abnormal; Notable for the following components:    Phosphorus 0.7 (*)     All other components within normal limits   BASIC METABOLIC PANEL - Abnormal; Notable for the following components:    Potassium 2.8 (*)     CO2 11 (*)     Glucose 385 (*)     Calcium 8.2 (*)     All other components within normal limits    Narrative:     CALL  [employer] tel. ,  Chemistry results called to and read back by OANH CEBALLOS [facility], 12/28/2021  07:03, by Angelica Bah   PHOSPHORUS - Abnormal; Notable for the following components:    Phosphorus 0.6 (*)     All other components within normal limits   BASIC METABOLIC PANEL - Abnormal; Notable for the following components:    Potassium 2.9 (*)     Chloride 115 (*)     CO2 18 (*)     Glucose 303 (*)     Calcium 7.8 (*)     All other components within normal limits   PHOSPHORUS - Abnormal; Notable for the following components:    Phosphorus 0.4 (*)     All other components within normal limits   BASIC METABOLIC PANEL - Abnormal; Notable for the following components:    Potassium 3.0 (*)     Chloride 119 (*)     CO2 17 (*)     Glucose 166 (*)     Calcium 7.9 (*)     All other components within normal limits   HEMOGLOBIN A1C - Abnormal; Notable for the following components:    Hemoglobin A1C 13.1 (*)     All other components within normal limits   BASIC METABOLIC PANEL - Abnormal; Notable for the following components:    Potassium 2.7 (*)     Chloride 117 (*)     CO2 17 (*)     Glucose 274 (*)     Calcium 7.9 (*)     All other components within normal limits    Narrative:     CALL  Cheung  SUN tel. ,  Chemistry results called to and read back by SKYLA CEBALLOS AT [facility], 12/28/2021  17:19, by [facility]  Collection has been rescheduled by [facility] at 12/28/2021 17:14    PHOSPHORUS - Abnormal; Notable for the following components:    Phosphorus 0.4 (*)     All other components within normal limits    Narrative:     CALL  Cheung  SUN tel. ,  Chemistry results called to and read back by SKYLA CEBALLOS AT [facility], 12/28/2021  17:19, by [facility]  Collection has been rescheduled by [facility] at 12/28/2021 54:80    BASIC METABOLIC PANEL W/ REFLEX TO MG FOR LOW K - Abnormal; Notable for the following components:    Chloride 113 (*)     CO2 12 (*)     Glucose 373 (*)     Calcium 7.8 (*)     All other components within normal limits   PHOSPHORUS - Abnormal; Notable for the following components:    Phosphorus 1.3 (*)     All other components within normal limits   CBC WITH AUTO DIFFERENTIAL - Abnormal; Notable for the following components:    WBC 12.8 (*)     Hematocrit 40.2 (*)     Neutrophils % 86.1 (*)     Lymphocytes % 5.0 (*)     Neutrophils Absolute 11.1 (*)     Lymphocytes Absolute 0.6 (*)     Monocytes Absolute 1.00 (*)     All other components within normal limits   BLOOD GAS, ARTERIAL - Abnormal; Notable for the following components:    pCO2, Arterial 20.0 (*)     pO2, Arterial 59.0 (*)     HCO3, Arterial 13.0 (*)     Base Excess, Arterial -8.7 (*)     All other components within normal limits    Narrative:     CALL  Jonatan Millan RN, 12/29/2021 07:51, by [address] [place] [address] Abnormal; Notable for the following components:    Sodium 149 (*)     Potassium 2.7 (*)     Chloride 118 (*)     Glucose 111 (*)     BUN 21 (*)     Calcium 8.1 (*)     All other components within normal limits    Narrative:     CALL  Jonatan GARSIA tel. ,  Chemistry results called to and read back by sharifa CEBALLOS/ROLANDO, 12/29/2021 20:56,  by Caden Fisher - Abnormal; Notable for the following components:    Potassium 3.2 (*)     Chloride 116 (*)     CO2 20 (*)     Glucose 224 (*)     Calcium 7.8 (*)     All other components within normal limits   BASIC METABOLIC PANEL - Abnormal; Notable for the following components:    Sodium 146 (*)     Chloride 115 (*)     CO2 19 (*)     Glucose 315 (*)     Calcium 7.9 (*)     All other components within normal limits   PHOSPHORUS - Abnormal; Notable for the following components:    Phosphorus 0.9 (*)     All other components within normal limits   PHOSPHORUS - Abnormal; Notable for the following components:    Phosphorus 1.9 (*)     All other components within normal limits   PHOSPHORUS - Abnormal; Notable for the following components:    Phosphorus 1.9 (*)     All other components within normal limits   CBC WITH AUTO DIFFERENTIAL - Abnormal; Notable for the following components:    RBC 4.36 (*)     Hemoglobin 13.2 (*)     Hematocrit 36.9 (*)     Neutrophils % 74.0 (*)     All other components within normal limits   BASIC METABOLIC PANEL - Abnormal; Notable for the following components:    Potassium 3.2 (*)     Chloride 115 (*)     CO2 19 (*)     Glucose 318 (*)     Calcium 7.1 (*)     All other components within normal limits   PHOSPHORUS - Abnormal; Notable for the following components:    Phosphorus 1.9 (*)     All other components within normal limits   BASIC METABOLIC PANEL - Abnormal; Notable for the following components:    Chloride 112 (*)     CO2 18 (*)     Glucose 216 (*)     Calcium 7.8 (*)     All other components within normal limits   MAGNESIUM - Abnormal; Notable for the following components:    Magnesium 1.5 (*)     All other components within normal limits   PHOSPHORUS - Abnormal; Notable for the following components:    Phosphorus 1.0 (*)     All other components within normal limits   BRAIN NATRIURETIC PEPTIDE - Abnormal; Notable for the following components:    Pro-BNP 1,164 (*)     All other components within normal limits   C-REACTIVE PROTEIN - Abnormal; Notable for the following components:    CRP 9.80 (*)     All other components within normal limits   CBC WITH AUTO DIFFERENTIAL - Abnormal; Notable for the following components:    WBC 11.1 (*)     RBC 4.31 (*)     Hemoglobin 12.7 (*)     Hematocrit 37.7 (*)     Neutrophils % 90.0 (*)     Lymphocytes % 4.0 (*)     Neutrophils Absolute 10.1 (*)     Lymphocytes Absolute 0.4 (*)     All other components within normal limits   COMPREHENSIVE METABOLIC PANEL - Abnormal; Notable for the following components:    Glucose 230 (*)     Calcium 7.9 (*)     Total Protein 5.7 (*)     Albumin 2.6 (*)     All other components within normal limits   C-REACTIVE PROTEIN - Abnormal; Notable for the following components:    CRP 11.53 (*)     All other components within normal limits   CBC WITH AUTO DIFFERENTIAL - Abnormal; Notable for the following components:    RBC 4.11 (*)     Hemoglobin 12.4 (*)     Hematocrit 36.1 (*)     Platelets 994 (*)     Neutrophils % 69.0 (*)     Lymphocytes % 13.0 (*)     Monocytes % 15.0 (*)     Lymphocytes Absolute 0.8 (*)     Monocytes Absolute 1.00 (*)     All other components within normal limits   COMPREHENSIVE METABOLIC PANEL - Abnormal; Notable for the following components:    Potassium 2.9 (*)     Glucose 206 (*)     Calcium 8.0 (*)     Total Protein 5.8 (*)     Albumin 2.7 (*)     All other components within normal limits   FIBRINOGEN - Abnormal; Notable for the following components:    Fibrinogen 530 (*)     All other components within normal limits   C-REACTIVE PROTEIN - Abnormal; Notable for the following components:    CRP 7.77 (*)     All other components within normal limits   CBC WITH AUTO DIFFERENTIAL - Abnormal; Notable for the following components:    RBC 4.42 (*)     Hemoglobin 13.5 (*)     Hematocrit 40.1 (*)     Neutrophils % 28.0 (*)     Monocytes % 29.0 (*)     Monocytes Absolute 1.90 (*)     All other components within normal limits   COMPREHENSIVE METABOLIC PANEL - Abnormal; Notable for the following components:    Sodium 134 (*)     Chloride 96 (*)     Glucose 205 (*)     Calcium 8.5 (*)     Total Protein 6.4 (*)     Albumin 2.8 (*)     All other components within normal limits   CBC WITH AUTO DIFFERENTIAL - Abnormal; Notable for the following components:    RBC 4.37 (*)     Hemoglobin 13.0 (*)     Hematocrit 39.4 (*)     Neutrophils % 40.1 (*)     Monocytes % 22.7 (*)     Monocytes Absolute 1.90 (*)     All other components within normal limits   COMPREHENSIVE METABOLIC PANEL - Abnormal; Notable for the following components:    Sodium 131 (*)     Chloride 94 (*)     Glucose 216 (*)     Calcium 8.5 (*)     Total Protein 6.4 (*)     Albumin 2.8 (*)     All other components within normal limits   POCT GLUCOSE - Abnormal; Notable for the following components:    POC Glucose 308 (*)     All other components within normal limits   POCT GLUCOSE - Abnormal; Notable for the following components:    POC Glucose 349 (*)     All other components within normal limits   POCT GLUCOSE - Abnormal; Notable for the following components:    POC Glucose 269 (*)     All other components within normal limits   POCT GLUCOSE - Abnormal; Notable for the following components:    POC Glucose 325 (*)     All other components within normal limits   POCT GLUCOSE - Abnormal; Notable for the following components:    POC Glucose 284 (*)     All other components within normal limits   POCT GLUCOSE - Abnormal; Notable for the following components:    POC Glucose 297 (*)     All other components within normal limits POCT GLUCOSE - Abnormal; Notable for the following components:    POC Glucose 289 (*)     All other components within normal limits   POCT GLUCOSE - Abnormal; Notable for the following components:    POC Glucose 291 (*)     All other components within normal limits   POCT GLUCOSE - Abnormal; Notable for the following components:    POC Glucose 335 (*)     All other components within normal limits   POCT GLUCOSE - Abnormal; Notable for the following components:    POC Glucose 313 (*)     All other components within normal limits   POCT GLUCOSE - Abnormal; Notable for the following components:    POC Glucose 267 (*)     All other components within normal limits   POCT GLUCOSE - Abnormal; Notable for the following components:    POC Glucose 261 (*)     All other components within normal limits   POCT GLUCOSE - Abnormal; Notable for the following components:    POC Glucose 234 (*)     All other components within normal limits   POCT GLUCOSE - Abnormal; Notable for the following components:    POC Glucose 270 (*)     All other components within normal limits   POCT GLUCOSE - Abnormal; Notable for the following components:    POC Glucose 258 (*)     All other components within normal limits   POCT GLUCOSE - Abnormal; Notable for the following components:    POC Glucose 240 (*)     All other components within normal limits   POCT GLUCOSE - Abnormal; Notable for the following components:    POC Glucose 211 (*)     All other components within normal limits   POCT GLUCOSE - Abnormal; Notable for the following components:    POC Glucose 389 (*)     All other components within normal limits   POCT GLUCOSE - Abnormal; Notable for the following components:    POC Glucose 451 (*)     All other components within normal limits   POCT GLUCOSE - Abnormal; Notable for the following components:    POC Glucose 478 (*)     All other components within normal limits   POCT GLUCOSE - Abnormal; Notable for the following components:    POC Glucose 451 (*)     All other components within normal limits   POCT GLUCOSE - Abnormal; Notable for the following components:    POC Glucose 302 (*)     All other components within normal limits   POCT GLUCOSE - Abnormal; Notable for the following components:    POC Glucose 296 (*)     All other components within normal limits   POCT GLUCOSE - Abnormal; Notable for the following components:    POC Glucose 282 (*)     All other components within normal limits   POCT GLUCOSE - Abnormal; Notable for the following components:    POC Glucose 243 (*)     All other components within normal limits   POCT GLUCOSE - Abnormal; Notable for the following components:    POC Glucose 319 (*)     All other components within normal limits   POCT GLUCOSE - Abnormal; Notable for the following components:    POC Glucose 285 (*)     All other components within normal limits   POCT GLUCOSE - Abnormal; Notable for the following components:    POC Glucose 229 (*)     All other components within normal limits   POCT GLUCOSE - Abnormal; Notable for the following components:    POC Glucose 188 (*)     All other components within normal limits   POCT GLUCOSE - Abnormal; Notable for the following components:    POC Glucose 176 (*)     All other components within normal limits   POCT GLUCOSE - Abnormal; Notable for the following components:    POC Glucose 121 (*)     All other components within normal limits   POCT GLUCOSE - Abnormal; Notable for the following components:    POC Glucose 335 (*)     All other components within normal limits   POCT GLUCOSE - Abnormal; Notable for the following components:    POC Glucose 294 (*)     All other components within normal limits   POCT GLUCOSE - Abnormal; Notable for the following components:    POC Glucose 211 (*)     All other components within normal limits   POCT GLUCOSE - Abnormal; Notable for the following components:    POC Glucose 212 (*)     All other components within normal limits   POCT GLUCOSE - Abnormal; Notable for the following components:    POC Glucose 124 (*)     All other components within normal limits   POCT GLUCOSE - Abnormal; Notable for the following components:    POC Glucose 126 (*)     All other components within normal limits   POCT GLUCOSE - Abnormal; Notable for the following components:    POC Glucose 119 (*)     All other components within normal limits   POCT GLUCOSE - Abnormal; Notable for the following components:    POC Glucose 212 (*)     All other components within normal limits   POCT GLUCOSE - Abnormal; Notable for the following components:    POC Glucose 262 (*)     All other components within normal limits   POCT GLUCOSE - Abnormal; Notable for the following components:    POC Glucose 264 (*)     All other components within normal limits   POCT GLUCOSE - Abnormal; Notable for the following components:    POC Glucose 328 (*)     All other components within normal limits   POCT GLUCOSE - Abnormal; Notable for the following components:    POC Glucose 281 (*)     All other components within normal limits   POCT GLUCOSE - Abnormal; Notable for the following components:    POC Glucose 261 (*)     All other components within normal limits   POCT GLUCOSE - Abnormal; Notable for the following components:    POC Glucose 336 (*)     All other components within normal limits   POCT GLUCOSE - Abnormal; Notable for the following components:    POC Glucose 367 (*)     All other components within normal limits   POCT GLUCOSE - Abnormal; Notable for the following components:    POC Glucose 200 (*)     All other components within normal limits   POCT GLUCOSE - Abnormal; Notable for the following components:    POC Glucose 276 (*)     All other components within normal limits   POCT GLUCOSE - Abnormal; Notable for the following components:    POC Glucose 255 (*)     All other components within normal limits   POCT GLUCOSE - Abnormal; Notable for the following components:    POC Glucose 316 (*)     All other components within normal limits   POCT GLUCOSE - Abnormal; Notable for the following components:    POC Glucose 223 (*)     All other components within normal limits   POCT GLUCOSE - Abnormal; Notable for the following components:    POC Glucose 207 (*)     All other components within normal limits   POCT GLUCOSE - Abnormal; Notable for the following components:    POC Glucose 278 (*)     All other components within normal limits   POCT GLUCOSE - Abnormal; Notable for the following components:    POC Glucose 295 (*)     All other components within normal limits   POCT GLUCOSE - Abnormal; Notable for the following components:    POC Glucose 200 (*)     All other components within normal limits   POCT GLUCOSE - Abnormal; Notable for the following components:    POC Glucose 330 (*)     All other components within normal limits   POCT GLUCOSE - Abnormal; Notable for the following components:    POC Glucose 308 (*)     All other components within normal limits   POCT GLUCOSE - Abnormal; Notable for the following components:    POC Glucose 332 (*)     All other components within normal limits   POCT GLUCOSE - Abnormal; Notable for the following components:    POC Glucose 424 (*)     All other components within normal limits   POCT GLUCOSE - Abnormal; Notable for the following components:    POC Glucose 214 (*)     All other components within normal limits   POCT GLUCOSE - Abnormal; Notable for the following components:    POC Glucose 349 (*)     All other components within normal limits   POCT GLUCOSE - Normal   POCT GLUCOSE - Normal   POCT GLUCOSE - Normal   POCT GLUCOSE - Normal   POCT GLUCOSE - Normal   POCT GLUCOSE - Normal   CULTURE, BLOOD 1    Narrative:     ORDER#: [id number]                          ORDERED BY: Queenie Vincent  SOURCE: Blood LAC                          COLLECTED:  12/27/21 11:46  ANTIBIOTICS AT [place].:                      RECEIVED :  12/27/21 11:51   CULTURE, BLOOD 2 Narrative:     ORDER#: [id number]                          ORDERED BY: Nina Martin  SOURCE: Blood ra                           COLLECTED:  12/27/21 11:46  ANTIBIOTICS AT [place].:                      RECEIVED :  12/27/21 11:51   CULTURE, URINE    Narrative:     ORDER#: [id number]                          ORDERED BY: Nina Martin  SOURCE: Urine Clean Catch                  COLLECTED:  12/27/21 01:22  ANTIBIOTICS AT [place].:                      RECEIVED :  12/27/21 05:14   POTASSIUM, WHOLE BLOOD    Narrative:     CALL  Cheung  Dimas Grayson RN ER, 12/27/2021 04:17, by [place] Jett   HEPATIC FUNCTION PANEL   AMYLASE   LIPASE   LACTIC ACID, PLASMA   TROPONIN   CK   ETHANOL   URINE DRUG SCREEN   MAGNESIUM    Narrative:     Collection has been rescheduled by [facility] at 12/27/2021 17:57 Reason: Add  Collection has been rescheduled by [facility] at 12/27/2021 18:28 Reason:   Nurse collected  Collection has been rescheduled by [facility] at 12/27/2021 18:59 Reason:   Patient is a nurse collect   POTASSIUM, WHOLE BLOOD   MAGNESIUM   TSH WITHOUT REFLEX   POTASSIUM, WHOLE BLOOD   MAGNESIUM    Narrative:     [facility]  Chemistry results called to and read back by Dg Alanis, 12/27/2021  10:27, by Lex Snyder   POTASSIUM, WHOLE BLOOD   POTASSIUM, WHOLE BLOOD   MAGNESIUM   TROPONIN   MAGNESIUM   MAGNESIUM   MAGNESIUM   MAGNESIUM    Narrative:     CALL  Cheung  [facility]. [address] tel. ,  Chemistry results called to and read back by SKYLA CEBALLOS AT [facility]. [address], 12/28/2021  17:19, by [facility]  Collection has been rescheduled by [facility] at 12/28/2021 17:14    MAGNESIUM   POTASSIUM, WHOLE BLOOD    Narrative:     CALL  Cheung  Gil Rebolledo RN, 12/29/2021 07:51, by [facility] \[facility]"" [facility]   FIBRINOGEN   MAGNESIUM   FIBRINOGEN   MAGNESIUM   MAGNESIUM   MAGNESIUM   POCT GLUCOSE   POCT GLUCOSE   POCT GLUCOSE   POCT GLUCOSE   POCT GLUCOSE   POCT GLUCOSE   POCT GLUCOSE   POCT GLUCOSE   POCT GLUCOSE   POCT GLUCOSE   POCT GLUCOSE   POCT GLUCOSE   POCT GLUCOSE   POCT GLUCOSE   POCT GLUCOSE       All other labs were within normal range or not returned as of this dictation. EMERGENCY DEPARTMENT COURSE and DIFFERENTIAL DIAGNOSIS/MDM:   Vitals:    Vitals:    01/02/22 1807 01/03/22 0021 01/03/22 0607 01/03/22 1150   BP: 122/86 101/61 105/82 (!) 121/99   Pulse: 104 87 92 117   Resp: 16 16 16 18   Temp: 97.1 °F (36.2 °C) 96.8 °F (36 °C) 97.3 °F (36.3 °C) 97.3 °F (36.3 °C)   TempSrc: Temporal  Temporal Temporal   SpO2: 95% 96% 96% 92%   Weight:       Height:           MDM  46-year-old male presents to the emergency department with multiple complaints. Lab, EKG, and radiology results reviewed. Patient is in DKA and is also positive for COVID-19 with evidence of pneumonia. Patient started on fluids and insulin. Discussed with Dr. Allan Jett, hospitalist, who will admit patient for further evaluation and treatment. PROCEDURES:  Unless otherwise noted below, none     Procedures    FINAL IMPRESSION      1. Diabetic ketoacidosis without coma associated with other specified diabetes mellitus ([facility] [address]    2. COVID-19 virus infection    3.  DKA, type 1, not at goal [facility])          [address] Admitted 12/27/2021 08:35:59 AM        (Please note that portions of this note were completed with a voice recognition program.  Efforts were made to edit thedictations but occasionally words are mis-transcribed.)    Russell Richard MD (electronically signed)  Attending Emergency Physician          Russell Richard MD  01/15/22 1585

## 2022-06-30 ENCOUNTER — TRANSCRIBE ORDERS (OUTPATIENT)
Dept: LAB | Facility: HOSPITAL | Age: 47
End: 2022-06-30

## 2022-06-30 DIAGNOSIS — Z11.59 SCREENING FOR VIRAL DISEASE: Primary | ICD-10-CM

## 2022-07-05 ENCOUNTER — PRE-ADMISSION TESTING (OUTPATIENT)
Dept: PREADMISSION TESTING | Facility: HOSPITAL | Age: 47
End: 2022-07-05

## 2022-07-05 ENCOUNTER — LAB (OUTPATIENT)
Dept: LAB | Facility: HOSPITAL | Age: 47
End: 2022-07-05

## 2022-07-05 VITALS
WEIGHT: 205.69 LBS | BODY MASS INDEX: 30.47 KG/M2 | HEART RATE: 72 BPM | OXYGEN SATURATION: 97 % | HEIGHT: 69 IN | RESPIRATION RATE: 18 BRPM | SYSTOLIC BLOOD PRESSURE: 121 MMHG | DIASTOLIC BLOOD PRESSURE: 65 MMHG

## 2022-07-05 LAB
ANION GAP SERPL CALCULATED.3IONS-SCNC: 9 MMOL/L (ref 5–15)
BUN SERPL-MCNC: 21 MG/DL (ref 6–20)
BUN/CREAT SERPL: 17.8 (ref 7–25)
CALCIUM SPEC-SCNC: 9.6 MG/DL (ref 8.6–10.5)
CHLORIDE SERPL-SCNC: 97 MMOL/L (ref 98–107)
CO2 SERPL-SCNC: 31 MMOL/L (ref 22–29)
CREAT SERPL-MCNC: 1.18 MG/DL (ref 0.76–1.27)
DEPRECATED RDW RBC AUTO: 44.9 FL (ref 37–54)
EGFRCR SERPLBLD CKD-EPI 2021: 77.1 ML/MIN/1.73
ERYTHROCYTE [DISTWIDTH] IN BLOOD BY AUTOMATED COUNT: 13.2 % (ref 12.3–15.4)
GLUCOSE SERPL-MCNC: 141 MG/DL (ref 65–99)
HCT VFR BLD AUTO: 44.3 % (ref 37.5–51)
HGB BLD-MCNC: 14.4 G/DL (ref 13–17.7)
MCH RBC QN AUTO: 29.9 PG (ref 26.6–33)
MCHC RBC AUTO-ENTMCNC: 32.5 G/DL (ref 31.5–35.7)
MCV RBC AUTO: 92.1 FL (ref 79–97)
PLATELET # BLD AUTO: 227 10*3/MM3 (ref 140–450)
PMV BLD AUTO: 11.3 FL (ref 6–12)
POTASSIUM SERPL-SCNC: 3.7 MMOL/L (ref 3.5–5.2)
RBC # BLD AUTO: 4.81 10*6/MM3 (ref 4.14–5.8)
SARS-COV-2 RNA PNL SPEC NAA+PROBE: NOT DETECTED
SODIUM SERPL-SCNC: 137 MMOL/L (ref 136–145)
WBC NRBC COR # BLD: 8.99 10*3/MM3 (ref 3.4–10.8)

## 2022-07-05 PROCEDURE — 87635 SARS-COV-2 COVID-19 AMP PRB: CPT | Performed by: ORTHOPAEDIC SURGERY

## 2022-07-05 PROCEDURE — C9803 HOPD COVID-19 SPEC COLLECT: HCPCS

## 2022-07-05 PROCEDURE — 93005 ELECTROCARDIOGRAM TRACING: CPT

## 2022-07-05 PROCEDURE — 36415 COLL VENOUS BLD VENIPUNCTURE: CPT

## 2022-07-05 PROCEDURE — 80048 BASIC METABOLIC PNL TOTAL CA: CPT

## 2022-07-05 PROCEDURE — 93010 ELECTROCARDIOGRAM REPORT: CPT | Performed by: INTERNAL MEDICINE

## 2022-07-05 PROCEDURE — 85027 COMPLETE CBC AUTOMATED: CPT

## 2022-07-05 RX ORDER — SPIRONOLACTONE 25 MG/1
25 TABLET ORAL DAILY
COMMUNITY
Start: 2022-03-01

## 2022-07-05 RX ORDER — ASPIRIN 81 MG/1
81 TABLET ORAL DAILY
COMMUNITY

## 2022-07-05 RX ORDER — INSULIN LISPRO 100 [IU]/ML
11-13 INJECTION, SOLUTION INTRAVENOUS; SUBCUTANEOUS 3 TIMES DAILY
COMMUNITY
Start: 2022-05-24

## 2022-07-05 RX ORDER — VALSARTAN 80 MG/1
80 TABLET ORAL DAILY
COMMUNITY
Start: 2022-06-29 | End: 2023-06-30

## 2022-07-05 RX ORDER — PROCHLORPERAZINE 25 MG/1
SUPPOSITORY RECTAL
COMMUNITY

## 2022-07-05 RX ORDER — FUROSEMIDE 40 MG/1
40 TABLET ORAL DAILY
COMMUNITY
Start: 2022-05-24

## 2022-07-05 RX ORDER — METOPROLOL SUCCINATE 25 MG/1
25 TABLET, EXTENDED RELEASE ORAL DAILY
COMMUNITY
Start: 2022-07-05

## 2022-07-05 NOTE — DISCHARGE INSTRUCTIONS
Before you come to the hospital        Arrival time: AS DIRECTED BY OFFICE     YOU MAY TAKE THE FOLLOWING MEDICATION(S) THE MORNING OF SURGERY WITH A SIP OF WATER: METOPROLOL           DO NOT TAKE YOUR VALSARTAN 24 HOURS PRIOR TO SURGERY          ALL OTHER HOME MEDICATION CHECK WITH YOUR PHYSICIAN (especially if you are taking diabetes medicines or blood thinners)    Do not take any Erectile Dysfunction medications (EX: CIALIS, VIAGRA) 24 hours prior to surgery      If you were given and instructed to use a germ- killing soap, use as directed the night before surgery and the morning of surgery before coming to the hospital.             Eating and drinking restrictions prior to scheduled arrival time    2 Hours before arrival time STOP   Drinking Clear liquids (water, apple juice-no pulp)     6 Hours before arrival time STOP   Milk or drinks that contain milk, full liquids    6 Hours before arrival time STOP   Light meals or foods, such as toast or cereal    8 Hours before arrival time STOP   Heavy foods, such as meat, fried foods, or fatty foods    (It is extremely important that you follow these guidelines to prevent delay or cancelation of your procedure)     Clear Liquids  Water and flavored water                                                                      Clear Fruit juices, such as cranberry juice and apple juice.  Black coffee (NO cream of any kind, including powdered).  Plain tea  Clear bouillon or broth.  Flavored gelatin.  Soda.  Gatorade or Powerade.  Full liquid examples  Juices that have pulp.  Frozen ice pops that contain fruit pieces.  Coffee with creamer  Milk.  Yogurt.              MANAGING PAIN AFTER SURGERY    We know you are probably wondering what your pain will be like after surgery.  Following surgery it is unrealistic to expect you will not have pain.   Pain is how our bodies let us know that something is wrong or cautions us to be careful.  That said, our goal is to make your pain  tolerable.    Methods we may use to treat your pain include (oral or IV medications, PCAs, epidurals, nerve blocks, etc.)   While some procedures require IV pain medications for a short time after surgery, transitioning to pain medications by mouth allows for better management of pain.   Your nurse will encourage you to take oral pain medications whenever possible.  IV medications work almost immediately, but only last a short while.  Taking medications by mouth allows for a more constant level of medication in your blood stream for a longer period of time.      Once your pain is out of control it is harder to get back under control.  It is important you are aware when your next dose of pain medication is due.  If you are admitted, your nurse may write the time of your next dose on the white board in your room to help you remember.      We are interested in your pain and encourage you to inform us about aggravating factors during your visit.   Many times a simple repositioning every few hours can make a big difference.    If your physician says it is okay, do not let your pain prevent you from getting out of bed. Be sure to call your nurse for assistance prior to getting up so you do not fall.      Before surgery, please decide your tolerable pain goal.  These faces help describe the pain ratings we use on a 0-10 scale.   Be prepared to tell us your goal and whether or not you take pain or anxiety medications at home.

## 2022-07-06 ENCOUNTER — ANESTHESIA EVENT (OUTPATIENT)
Dept: PERIOP | Facility: HOSPITAL | Age: 47
End: 2022-07-06

## 2022-07-06 ENCOUNTER — ANESTHESIA (OUTPATIENT)
Dept: PERIOP | Facility: HOSPITAL | Age: 47
End: 2022-07-06

## 2022-07-06 ENCOUNTER — HOSPITAL ENCOUNTER (OUTPATIENT)
Facility: HOSPITAL | Age: 47
Setting detail: HOSPITAL OUTPATIENT SURGERY
Discharge: HOME | End: 2022-07-06
Attending: ORTHOPAEDIC SURGERY | Admitting: ORTHOPAEDIC SURGERY

## 2022-07-06 VITALS
OXYGEN SATURATION: 98 % | RESPIRATION RATE: 16 BRPM | TEMPERATURE: 97.8 F | HEART RATE: 65 BPM | SYSTOLIC BLOOD PRESSURE: 110 MMHG | DIASTOLIC BLOOD PRESSURE: 73 MMHG

## 2022-07-06 LAB
GLUCOSE BLDC GLUCOMTR-MCNC: 124 MG/DL (ref 70–130)
GLUCOSE BLDC GLUCOMTR-MCNC: 134 MG/DL (ref 70–130)

## 2022-07-06 PROCEDURE — 25010000002 ONDANSETRON PER 1 MG: Performed by: NURSE ANESTHETIST, CERTIFIED REGISTERED

## 2022-07-06 PROCEDURE — 82962 GLUCOSE BLOOD TEST: CPT

## 2022-07-06 PROCEDURE — 25010000002 CEFAZOLIN PER 500 MG: Performed by: ORTHOPAEDIC SURGERY

## 2022-07-06 PROCEDURE — 25010000002 FENTANYL CITRATE (PF) 100 MCG/2ML SOLUTION: Performed by: NURSE ANESTHETIST, CERTIFIED REGISTERED

## 2022-07-06 PROCEDURE — 25010000002 PROPOFOL 10 MG/ML EMULSION: Performed by: NURSE ANESTHETIST, CERTIFIED REGISTERED

## 2022-07-06 RX ORDER — NALOXONE HCL 0.4 MG/ML
0.4 VIAL (ML) INJECTION AS NEEDED
Status: DISCONTINUED | OUTPATIENT
Start: 2022-07-06 | End: 2022-07-06 | Stop reason: HOSPADM

## 2022-07-06 RX ORDER — MAGNESIUM HYDROXIDE 1200 MG/15ML
LIQUID ORAL AS NEEDED
Status: DISCONTINUED | OUTPATIENT
Start: 2022-07-06 | End: 2022-07-06 | Stop reason: HOSPADM

## 2022-07-06 RX ORDER — ONDANSETRON 2 MG/ML
INJECTION INTRAMUSCULAR; INTRAVENOUS AS NEEDED
Status: DISCONTINUED | OUTPATIENT
Start: 2022-07-06 | End: 2022-07-06 | Stop reason: SURG

## 2022-07-06 RX ORDER — ONDANSETRON 2 MG/ML
4 INJECTION INTRAMUSCULAR; INTRAVENOUS ONCE AS NEEDED
Status: DISCONTINUED | OUTPATIENT
Start: 2022-07-06 | End: 2022-07-06 | Stop reason: HOSPADM

## 2022-07-06 RX ORDER — LIDOCAINE HYDROCHLORIDE 20 MG/ML
INJECTION, SOLUTION EPIDURAL; INFILTRATION; INTRACAUDAL; PERINEURAL AS NEEDED
Status: DISCONTINUED | OUTPATIENT
Start: 2022-07-06 | End: 2022-07-06 | Stop reason: SURG

## 2022-07-06 RX ORDER — BUPIVACAINE HCL/0.9 % NACL/PF 0.1 %
2 PLASTIC BAG, INJECTION (ML) EPIDURAL ONCE
Status: COMPLETED | OUTPATIENT
Start: 2022-07-06 | End: 2022-07-06

## 2022-07-06 RX ORDER — ACETAMINOPHEN 500 MG
1000 TABLET ORAL ONCE
Status: COMPLETED | OUTPATIENT
Start: 2022-07-06 | End: 2022-07-06

## 2022-07-06 RX ORDER — SCOLOPAMINE TRANSDERMAL SYSTEM 1 MG/1
1 PATCH, EXTENDED RELEASE TRANSDERMAL ONCE
Status: DISCONTINUED | OUTPATIENT
Start: 2022-07-06 | End: 2022-07-06 | Stop reason: HOSPADM

## 2022-07-06 RX ORDER — OXYCODONE AND ACETAMINOPHEN 7.5; 325 MG/1; MG/1
2 TABLET ORAL EVERY 4 HOURS PRN
Status: DISCONTINUED | OUTPATIENT
Start: 2022-07-06 | End: 2022-07-06 | Stop reason: HOSPADM

## 2022-07-06 RX ORDER — LABETALOL HYDROCHLORIDE 5 MG/ML
5 INJECTION, SOLUTION INTRAVENOUS
Status: DISCONTINUED | OUTPATIENT
Start: 2022-07-06 | End: 2022-07-06 | Stop reason: HOSPADM

## 2022-07-06 RX ORDER — IBUPROFEN 600 MG/1
600 TABLET ORAL ONCE AS NEEDED
Status: DISCONTINUED | OUTPATIENT
Start: 2022-07-06 | End: 2022-07-06 | Stop reason: HOSPADM

## 2022-07-06 RX ORDER — OXYCODONE AND ACETAMINOPHEN 10; 325 MG/1; MG/1
1 TABLET ORAL ONCE AS NEEDED
Status: DISCONTINUED | OUTPATIENT
Start: 2022-07-06 | End: 2022-07-06 | Stop reason: HOSPADM

## 2022-07-06 RX ORDER — SODIUM CHLORIDE 0.9 % (FLUSH) 0.9 %
3 SYRINGE (ML) INJECTION EVERY 12 HOURS SCHEDULED
Status: DISCONTINUED | OUTPATIENT
Start: 2022-07-06 | End: 2022-07-06 | Stop reason: HOSPADM

## 2022-07-06 RX ORDER — FENTANYL CITRATE 50 UG/ML
INJECTION, SOLUTION INTRAMUSCULAR; INTRAVENOUS AS NEEDED
Status: DISCONTINUED | OUTPATIENT
Start: 2022-07-06 | End: 2022-07-06 | Stop reason: SURG

## 2022-07-06 RX ORDER — SODIUM CHLORIDE 0.9 % (FLUSH) 0.9 %
3-10 SYRINGE (ML) INJECTION AS NEEDED
Status: DISCONTINUED | OUTPATIENT
Start: 2022-07-06 | End: 2022-07-06 | Stop reason: HOSPADM

## 2022-07-06 RX ORDER — FENTANYL CITRATE 50 UG/ML
25 INJECTION, SOLUTION INTRAMUSCULAR; INTRAVENOUS
Status: DISCONTINUED | OUTPATIENT
Start: 2022-07-06 | End: 2022-07-06 | Stop reason: HOSPADM

## 2022-07-06 RX ORDER — SODIUM CHLORIDE, SODIUM LACTATE, POTASSIUM CHLORIDE, CALCIUM CHLORIDE 600; 310; 30; 20 MG/100ML; MG/100ML; MG/100ML; MG/100ML
100 INJECTION, SOLUTION INTRAVENOUS CONTINUOUS
Status: DISCONTINUED | OUTPATIENT
Start: 2022-07-06 | End: 2022-07-06 | Stop reason: HOSPADM

## 2022-07-06 RX ORDER — DROPERIDOL 2.5 MG/ML
0.62 INJECTION, SOLUTION INTRAMUSCULAR; INTRAVENOUS ONCE AS NEEDED
Status: DISCONTINUED | OUTPATIENT
Start: 2022-07-06 | End: 2022-07-06 | Stop reason: HOSPADM

## 2022-07-06 RX ORDER — SODIUM CHLORIDE 0.9 % (FLUSH) 0.9 %
10 SYRINGE (ML) INJECTION EVERY 12 HOURS SCHEDULED
Status: DISCONTINUED | OUTPATIENT
Start: 2022-07-06 | End: 2022-07-06 | Stop reason: HOSPADM

## 2022-07-06 RX ORDER — PROPOFOL 10 MG/ML
VIAL (ML) INTRAVENOUS AS NEEDED
Status: DISCONTINUED | OUTPATIENT
Start: 2022-07-06 | End: 2022-07-06 | Stop reason: SURG

## 2022-07-06 RX ORDER — SODIUM CHLORIDE, SODIUM LACTATE, POTASSIUM CHLORIDE, CALCIUM CHLORIDE 600; 310; 30; 20 MG/100ML; MG/100ML; MG/100ML; MG/100ML
100 INJECTION, SOLUTION INTRAVENOUS CONTINUOUS PRN
Status: DISCONTINUED | OUTPATIENT
Start: 2022-07-06 | End: 2022-07-06 | Stop reason: HOSPADM

## 2022-07-06 RX ORDER — SODIUM CHLORIDE 0.9 % (FLUSH) 0.9 %
10 SYRINGE (ML) INJECTION AS NEEDED
Status: DISCONTINUED | OUTPATIENT
Start: 2022-07-06 | End: 2022-07-06 | Stop reason: HOSPADM

## 2022-07-06 RX ORDER — LIDOCAINE HYDROCHLORIDE 10 MG/ML
0.5 INJECTION, SOLUTION EPIDURAL; INFILTRATION; INTRACAUDAL; PERINEURAL ONCE AS NEEDED
Status: DISCONTINUED | OUTPATIENT
Start: 2022-07-06 | End: 2022-07-06 | Stop reason: HOSPADM

## 2022-07-06 RX ADMIN — LIDOCAINE HYDROCHLORIDE 100 MG: 20 INJECTION, SOLUTION EPIDURAL; INFILTRATION; INTRACAUDAL; PERINEURAL at 09:28

## 2022-07-06 RX ADMIN — ACETAMINOPHEN 1000 MG: 500 TABLET, FILM COATED ORAL at 08:45

## 2022-07-06 RX ADMIN — SODIUM CHLORIDE, POTASSIUM CHLORIDE, SODIUM LACTATE AND CALCIUM CHLORIDE 100 ML/HR: 600; 310; 30; 20 INJECTION, SOLUTION INTRAVENOUS at 08:01

## 2022-07-06 RX ADMIN — Medication 2 G: at 09:31

## 2022-07-06 RX ADMIN — FENTANYL CITRATE 100 MCG: 50 INJECTION, SOLUTION INTRAMUSCULAR; INTRAVENOUS at 09:28

## 2022-07-06 RX ADMIN — PROPOFOL 200 MG: 10 INJECTION, EMULSION INTRAVENOUS at 09:28

## 2022-07-06 RX ADMIN — ONDANSETRON 4 MG: 2 INJECTION INTRAMUSCULAR; INTRAVENOUS at 10:18

## 2022-07-06 RX ADMIN — SCOPALAMINE 1 PATCH: 1 PATCH, EXTENDED RELEASE TRANSDERMAL at 08:45

## 2022-07-06 NOTE — ANESTHESIA PREPROCEDURE EVALUATION
Anesthesia Evaluation     Patient summary reviewed   no history of anesthetic complications:  NPO Solid Status: > 6 hours             Airway   Mallampati: II  TM distance: >3 FB  Dental - normal exam     Pulmonary    (-) not a smoker  Cardiovascular   Exercise tolerance: good (4-7 METS)    Patient on routine beta blocker    (+) hypertension, CHF ,   (-) pacemaker, cardiac stents, CABG    ROS comment: May 2022: The left ventricle is normal in size. (LVIDd 4.38 cm) Left ventricular wall thickness is normal. Left ventricular ejection fraction is normal. The visually estimated ejection fraction is 40-50%. Left ventricular wall motion is normal. Left ventricular diastolic function appears normal.     Neuro/Psych  (-) seizures, CVA  GI/Hepatic/Renal/Endo    (+)   diabetes mellitus (prior DKA ),   (-) liver disease, no renal disease    Musculoskeletal     Abdominal    Substance History      OB/GYN          Other   arthritis,                      Anesthesia Plan    ASA 3     general     (Cardiomyopathy--much improved symptomatically and functionally; seems to be associated with COVID and DKA admission; 5/2022 TTE reviewed; )  intravenous induction     Anesthetic plan, risks, benefits, and alternatives have been provided, discussed and informed consent has been obtained with: patient.        CODE STATUS:

## 2022-07-06 NOTE — OP NOTE
Patient Name: Neptali  : 1975  MRN: [id number]      DATE of SURGERY: 2022    SURGEON: Benigno Garcia MD    ASSISTANT: NONE    PREOPERATIVE DIAGNOSIS    1.  Right wrist pain  2.  Type 1 diabetes mellitus  3.  Cardiomyopathy     POSTOPERATIVE DIAGNOSIS    1.  Right wrist pain, dorsal capsular impingement  2.  Type 1 diabetes mellitus  3.  Cardiomyopathy      PROCEDURE PERFORMED    1.  Right wrist diagnostic arthroscopy, limited joint debridement      IMPLANTS  None     ANESTHESIA    General endotracheal with regional block.       OPERATIVE INDICATIONS    Mr. Oviedo is a 47-year-old gentleman who has been treated in the clinical setting for chronic, atraumatic right dorsal wrist pain aggravated with wrist extension and axially loading the wrist.  He is undergone conservative management with persistent, relatively stable symptoms.  Ultimately, we discussed a diagnostic wrist arthroscopy and he agreed to proceed.  Risks including, but not limited to, bleeding, infection, wound healing problems, failure to alleviate any or all of his preoperative symptoms, symptomatic recurrence, numbness to the dorsal aspect of the hand, injury to surrounding structures with subsequent hand dysfunction, stiffness, weakness and adhesions were all discussed.  All questions were answered preoperatively.  Written and informed consent were obtained.     ESTIMATED BLOOD LOSS    Less than 5 mL.       SPECIMENS    None.       DRAINS  None.       COMPLICATIONS    None.       PROCEDURE IN DETAIL    Patient was met in the preoperative holding area where the correct patient, procedure and side were confirmed.  The operative site was marked by myself with the patient's agreement.  The consent was signed by myself.  Prior to transportation to the operating room, the anesthesia staff provided a regional block.  He was then transported to the operating room and placed supine on the operating room table.  He was secured to the table and all  bony prominences were padded.  A formal timeout was held in which myself, the operating team and patient, again, identify the correct patient, procedure and side.  General anesthesia was induced.  Preoperative antibiotics were administered within 1 hour of incision.  A nonsterile tourniquet was placed high on the right brachium.  The right upper extremities prepped and draped in a normal sterile fashion.  The right upper extremity was then placed in the [facility] traction tower which was well-padded.  Approximately 12 pounds of traction was placed through the right wrist and was gently flexed.  A skin marker was used to delineate arthroscopic 3-4, 4-5, 6R and 6U portals.  The right upper extremity was then exsanguinated with an Esmarch and the tourniquet was inflated to 250 mmHg for less than 30 minutes.  The right radiocarpal joint was then insufflated with approximately 6 cc of normal saline utilizing the arthroscopic 3-4 portal.  An 11 blade was then used to incise only through the skin.  Blunt dissection was carried through the subcutaneous tissue and capsule and an efflux of saline was encountered.  The trocar was then inserted into the radiocarpal joint and this was exchanged for the arthroscope.  A diagnostic arthroscopy was then carried out.  Working from a radial to ulnar direction, the radial collateral and radioscaphocapitate ligaments were intact.  The volar extrinsic ligaments were intact.  The  articular surfaces of the distal radius and respective scaphoid and lunate were intact with no significant degenerative chondromalacia noted.  The scapholunate ligament was intact with no significant fraying.  Ulnarly, the TFCC was intact with a very mild amount of synovitis at the prestyloid recess.  Dorsally, there was excessive capsule infolding into the dorsal aspect of the radiocarpal joint with mild associated synovitis.  At this point, the 6R portal was established utilizing a hypodermic needle, 11 blade  with incision only through the skin and insertion of the blunt trocar which was then exchanged for the arthroscopic shaver.  A limited joint debridement and resection of the redundant dorsal capsule was performed.  An arthroscopic probe was then inserted and the trampoline test was performed on the TFCC and found to be intact.  There was no appreciable undersurface tear of the foveal insertion of the TFCC based on probing.  At this point, the arthroscope was exchanged and placed into the 6R portal.  The shaver was then placed into the 3-4 portal and limited joint debridement along the radial aspect of the radiocarpal joint was performed.  The lunotriquetral ligament was inspected and found to be intact with no significant fraying.  At this point, I attempted to remove as much fluid from the radiocarpal joint as possible.  The portal sites were reapproximated with 3-0 nylon sutures.  For postoperative pain control, 10 cc of 1% lidocaine with epinephrine were injected into the radiocarpal joint utilizing the arthroscopic 3-4 portal.  The right upper extremity was removed from the traction tower.  A well-padded volar resting splint was applied to the right hand.  Adequate capillary refill was retained to all digits at the conclusion of the procedure.  General anesthesia was reversed, he was transferred to hospital bed and moved to PACU in stable condition.  All counts the end the procedure were correct.    POSTOPERATIVE PLAN  Discharge home, return to clinic in 2 weeks for suture removal and activity as tolerated.  Activities as tolerated to operative extremity.  Keep splint in place until follow-up.

## 2022-07-06 NOTE — ANESTHESIA POSTPROCEDURE EVALUATION
Patient: Santos Oviedo    Procedure Summary     Date: 07/06/22 Room / Location:  [facility] OR  /  [facility] OR    Anesthesia Start: 0926 Anesthesia Stop: 1034    Procedure: RIGHT WRIST ARTHROSCOPY limited debridement (Right Wrist) Diagnosis: (RIGHT WRIST PAIN AND ARTHRITIS)    Surgeons: Benigno Garcia MD Provider: Sebastian Pretty CRNA    Anesthesia Type: general ASA Status: 3          Anesthesia Type: general    Vitals  Vitals Value Taken Time   /60 07/06/22 1046   Temp 97.8 °F (36.6 °C) 07/06/22 1032   Pulse 81 07/06/22 1048   Resp 19 07/06/22 1045   SpO2 97 % 07/06/22 1048   Vitals shown include unvalidated device data.        Post Anesthesia Care and Evaluation    Patient location during evaluation: PACU  Patient participation: complete - patient participated  Level of consciousness: awake and alert  Pain management: adequate    Airway patency: patent  Anesthetic complications: No anesthetic complications    Cardiovascular status: acceptable  Respiratory status: acceptable  Hydration status: acceptable    Comments: Blood pressure 109/64, pulse 69, temperature 97.8 °F (36.6 °C), temperature source Temporal, resp. rate 16, SpO2 98 %.    Pt discharged from PACU based on kentrell score >8

## 2022-07-06 NOTE — DISCHARGE INSTRUCTIONS
1.  Weightbearing as tolerated to operative extremity.  Elevate operative extremity.  2.  Encourage gentle digit motion.  3.  Keep splint in place until follow-up.  Do not get splint wet.  Do not remove splint.  Do not stick anything inside the splint.  4.  Pain medication as prescribed.  No additional Tylenol, alcohol or driving while on opioid pain medication.  Wean off pain medication as able.  5.  Return to clinic in 2 weeks for clinical evaluation.  6.  Call our clinic number in the interim with any questions or concerns.

## 2022-07-06 NOTE — ANESTHESIA PROCEDURE NOTES
Airway  Urgency: elective    Date/Time: 7/6/2022 9:29 AM  Airway not difficult    General Information and Staff    Patient location during procedure: OR    Indications and Patient Condition  Indications for airway management: airway protection    Preoxygenated: yes  MILS maintained throughout  Mask difficulty assessment: 1 - vent by mask    Final Airway Details  Final airway type: supraglottic airway      Successful airway: classic  Size 4    Number of attempts at approach: 1  Assessment: lips, teeth, and gum same as pre-op and atraumatic intubation

## 2022-07-09 LAB
QT INTERVAL: 416 MS
QTC INTERVAL: 455 MS

## 2022-08-02 ENCOUNTER — APPOINTMENT (RX ONLY)
Dept: URBAN - METROPOLITAN AREA CLINIC 127 | Facility: CLINIC | Age: 47
Setting detail: DERMATOLOGY
End: 2022-08-02

## 2022-08-02 DIAGNOSIS — L72.8 OTHER FOLLICULAR CYSTS OF THE SKIN AND SUBCUTANEOUS TISSUE: ICD-10-CM | Status: INADEQUATELY CONTROLLED

## 2022-08-02 PROCEDURE — ? PRESCRIPTION

## 2022-08-02 PROCEDURE — ? FULL BODY SKIN EXAM - DECLINED

## 2022-08-02 PROCEDURE — ? ADDITIONAL NOTES

## 2022-08-02 PROCEDURE — ? COUNSELING

## 2022-08-02 PROCEDURE — 99203 OFFICE O/P NEW LOW 30 MIN: CPT

## 2022-08-02 RX ORDER — DOXYCYCLINE HYCLATE 100 MG/1
CAPSULE, GELATIN COATED ORAL
Qty: 28 | Refills: 0 | Status: ERX | COMMUNITY
Start: 2022-08-02

## 2022-08-02 RX ADMIN — DOXYCYCLINE HYCLATE: 100 CAPSULE, GELATIN COATED ORAL at 00:00

## 2022-08-02 ASSESSMENT — LOCATION ZONE DERM
LOCATION ZONE: ARM
LOCATION ZONE: TRUNK

## 2022-08-02 ASSESSMENT — LOCATION DETAILED DESCRIPTION DERM
LOCATION DETAILED: INFERIOR THORACIC SPINE
LOCATION DETAILED: RIGHT MEDIAL UPPER BACK
LOCATION DETAILED: LEFT PROXIMAL DORSAL FOREARM

## 2022-08-02 ASSESSMENT — LOCATION SIMPLE DESCRIPTION DERM
LOCATION SIMPLE: LEFT FOREARM
LOCATION SIMPLE: RIGHT UPPER BACK
LOCATION SIMPLE: UPPER BACK

## 2022-08-02 NOTE — PROCEDURE: ADDITIONAL NOTES
Render Risk Assessment In Note?: no
Additional Notes: The cyst on the right upper medial back is almost 5 cm in diameter. Has been present for 4 years. Will discuss removal with supervising physician vs. referral to general surgeon.
Detail Level: Simple
Additional Notes: Patient consent was obtained to proceed with the visit and recommended plan of care after discussion of all risks and benefits, including the risks of COVID-19 exposure

## 2022-08-16 ENCOUNTER — APPOINTMENT (RX ONLY)
Dept: URBAN - METROPOLITAN AREA CLINIC 127 | Facility: CLINIC | Age: 47
Setting detail: DERMATOLOGY
End: 2022-08-16

## 2022-08-16 DIAGNOSIS — L72.8 OTHER FOLLICULAR CYSTS OF THE SKIN AND SUBCUTANEOUS TISSUE: ICD-10-CM

## 2022-08-16 PROCEDURE — 12032 INTMD RPR S/A/T/EXT 2.6-7.5: CPT

## 2022-08-16 PROCEDURE — ? EXCISION

## 2022-08-16 PROCEDURE — 11402 EXC TR-EXT B9+MARG 1.1-2 CM: CPT

## 2022-08-16 PROCEDURE — ? ADDITIONAL NOTES

## 2022-08-16 ASSESSMENT — LOCATION SIMPLE DESCRIPTION DERM: LOCATION SIMPLE: UPPER BACK

## 2022-08-16 ASSESSMENT — LOCATION DETAILED DESCRIPTION DERM: LOCATION DETAILED: INFERIOR THORACIC SPINE

## 2022-08-16 ASSESSMENT — LOCATION ZONE DERM: LOCATION ZONE: TRUNK

## 2022-08-16 NOTE — PROCEDURE: EXCISION
Medical Necessity Information: It is in your best interest to select a reason for this procedure from the list below. All of these items fulfill various CMS LCD requirements except lesion extends to a margin.
Include Z78.9 (Other Specified Conditions Influencing Health Status) As An Associated Diagnosis?: No
Medical Necessity Clause: This procedure was medically necessary because the lesion that was treated was:
Lab: 6
Lab Facility: 3
Size Of Lesion In Cm: 1.5
X Size Of Lesion In Cm (Optional): 1
Size Of Margin In Cm: 0.2
Anesthesia Volume In Cc: 9
Excision Method: Elliptical
Saucerization Depth: dermis and superficial adipose tissue
Repair Type: Intermediate
Suturegard Retention Suture: 2-0 Nylon
Retention Suture Bite Size: 3 mm
Length To Time In Minutes Device Was In Place: 10
Intermediate / Complex Repair - Final Wound Length In Cm: 3.5
Undermining Type: Entire Wound
Debridement Text: The wound edges were debrided prior to proceeding with the closure to facilitate wound healing.
Helical Rim Text: The closure involved the helical rim.
Vermilion Border Text: The closure involved the vermilion border.
Nostril Rim Text: The closure involved the nostril rim.
Retention Suture Text: Retention sutures were placed to support the closure and prevent dehiscence.
Primary Defect Length (In Cm): 0
Suture Removal: 10 days
Epidermal Closure Graft Donor Site (Optional): simple interrupted
Graft Donor Site Bandage (Optional-Leave Blank If You Don't Want In Note): Steri-strips and a pressure bandage were applied to the donor site.
Detail Level: Detailed
Excision Depth: adipose tissue
Scalpel Size: 10 blade
Anesthesia Type: 1% lidocaine with epinephrine
Hemostasis: Electrocautery
Estimated Blood Loss (Cc): minimal
Deep Sutures: 3-0 Monocryl
Epidermal Sutures: 3-0 Prolene
Wound Care: Mupirocin
Dressing: pressure dressing
Suturegard Intro: Intraoperative tissue expansion was performed, utilizing the SUTUREGARD device, in order to reduce wound tension.
Suturegard Body: The suture ends were repeatedly re-tightened and re-clamped to achieve the desired tissue expansion.
Hemigard Intro: Due to skin fragility and wound tension, it was decided to use HEMIGARD adhesive retention suture devices to permit a linear closure. The skin was cleaned and dried for a 6cm distance away from the wound. Excessive hair, if present, was removed to allow for adhesion.
Hemigard Postcare Instructions: The HEMIGARD strips are to remain completely dry for at least 5-7 days.
Complex Repair Preamble Text (Leave Blank If You Do Not Want): Extensive wide undermining was performed.
Intermediate Repair Preamble Text (Leave Blank If You Do Not Want): Undermining was performed with blunt dissection.
Fusiform Excision Additional Text (Leave Blank If You Do Not Want): The margin was drawn around the clinically apparent lesion.  A fusiform shape was then drawn on the skin incorporating the lesion and margins.  Incisions were then made along these lines to the appropriate tissue plane and the lesion was extirpated.
Eliptical Excision Additional Text (Leave Blank If You Do Not Want): The margin was drawn around the clinically apparent lesion.  An elliptical shape was then drawn on the skin incorporating the lesion and margins.  Incisions were then made along these lines to the appropriate tissue plane and the lesion was extirpated.
Saucerization Excision Additional Text (Leave Blank If You Do Not Want): The margin was drawn around the clinically apparent lesion.  Incisions were then made along these lines, in a tangential fashion, to the appropriate tissue plane and the lesion was extirpated.
Slit Excision Additional Text (Leave Blank If You Do Not Want): A linear line was drawn on the skin overlying the lesion. An incision was made slowly until the lesion was visualized.  Once visualized, the lesion was removed with blunt dissection.
Excisional Biopsy Additional Text (Leave Blank If You Do Not Want): The margin was drawn around the clinically apparent lesion. An elliptical shape was then drawn on the skin incorporating the lesion and margins.  Incisions were then made along these lines to the appropriate tissue plane and the lesion was extirpated.
Perilesional Excision Additional Text (Leave Blank If You Do Not Want): The margin was drawn around the clinically apparent lesion. Incisions were then made along these lines to the appropriate tissue plane and the lesion was extirpated.
Repair Performed By Another Provider Text (Leave Blank If You Do Not Want): After the tissue was excised the defect was repaired by another provider.
No Repair - Repaired With Adjacent Surgical Defect Text (Leave Blank If You Do Not Want): After the excision the defect was repaired concurrently with another surgical defect which was in close approximation.
Adjacent Tissue Transfer Text: The defect edges were debeveled with a #15 scalpel blade.  Given the location of the defect and the proximity to free margins an adjacent tissue transfer was deemed most appropriate.  Using a sterile surgical marker, an appropriate flap was drawn incorporating the defect and placing the expected incisions within the relaxed skin tension lines where possible.    The area thus outlined was incised deep to adipose tissue with a #15 scalpel blade.  The skin margins were undermined to an appropriate distance in all directions utilizing iris scissors.
Advancement Flap (Single) Text: The defect edges were debeveled with a #15 scalpel blade.  Given the location of the defect and the proximity to free margins a single advancement flap was deemed most appropriate.  Using a sterile surgical marker, an appropriate advancement flap was drawn incorporating the defect and placing the expected incisions within the relaxed skin tension lines where possible.    The area thus outlined was incised deep to adipose tissue with a #15 scalpel blade.  The skin margins were undermined to an appropriate distance in all directions utilizing iris scissors.
Advancement Flap (Double) Text: The defect edges were debeveled with a #15 scalpel blade.  Given the location of the defect and the proximity to free margins a double advancement flap was deemed most appropriate.  Using a sterile surgical marker, the appropriate advancement flaps were drawn incorporating the defect and placing the expected incisions within the relaxed skin tension lines where possible.    The area thus outlined was incised deep to adipose tissue with a #15 scalpel blade.  The skin margins were undermined to an appropriate distance in all directions utilizing iris scissors.
Burow's Advancement Flap Text: The defect edges were debeveled with a #15 scalpel blade.  Given the location of the defect and the proximity to free margins a Burow's advancement flap was deemed most appropriate.  Using a sterile surgical marker, the appropriate advancement flap was drawn incorporating the defect and placing the expected incisions within the relaxed skin tension lines where possible.    The area thus outlined was incised deep to adipose tissue with a #15 scalpel blade.  The skin margins were undermined to an appropriate distance in all directions utilizing iris scissors.
Chonodrocutaneous Helical Advancement Flap Text: The defect edges were debeveled with a #15 scalpel blade.  Given the location of the defect and the proximity to free margins a chondrocutaneous helical advancement flap was deemed most appropriate.  Using a sterile surgical marker, the appropriate advancement flap was drawn incorporating the defect and placing the expected incisions within the relaxed skin tension lines where possible.    The area thus outlined was incised deep to adipose tissue with a #15 scalpel blade.  The skin margins were undermined to an appropriate distance in all directions utilizing iris scissors.
Crescentic Advancement Flap Text: The defect edges were debeveled with a #15 scalpel blade.  Given the location of the defect and the proximity to free margins a crescentic advancement flap was deemed most appropriate.  Using a sterile surgical marker, the appropriate advancement flap was drawn incorporating the defect and placing the expected incisions within the relaxed skin tension lines where possible.    The area thus outlined was incised deep to adipose tissue with a #15 scalpel blade.  The skin margins were undermined to an appropriate distance in all directions utilizing iris scissors.
A-T Advancement Flap Text: The defect edges were debeveled with a #15 scalpel blade.  Given the location of the defect, shape of the defect and the proximity to free margins an A-T advancement flap was deemed most appropriate.  Using a sterile surgical marker, an appropriate advancement flap was drawn incorporating the defect and placing the expected incisions within the relaxed skin tension lines where possible.    The area thus outlined was incised deep to adipose tissue with a #15 scalpel blade.  The skin margins were undermined to an appropriate distance in all directions utilizing iris scissors.
O-T Advancement Flap Text: The defect edges were debeveled with a #15 scalpel blade.  Given the location of the defect, shape of the defect and the proximity to free margins an O-T advancement flap was deemed most appropriate.  Using a sterile surgical marker, an appropriate advancement flap was drawn incorporating the defect and placing the expected incisions within the relaxed skin tension lines where possible.    The area thus outlined was incised deep to adipose tissue with a #15 scalpel blade.  The skin margins were undermined to an appropriate distance in all directions utilizing iris scissors.
O-L Flap Text: The defect edges were debeveled with a #15 scalpel blade.  Given the location of the defect, shape of the defect and the proximity to free margins an O-L flap was deemed most appropriate.  Using a sterile surgical marker, an appropriate advancement flap was drawn incorporating the defect and placing the expected incisions within the relaxed skin tension lines where possible.    The area thus outlined was incised deep to adipose tissue with a #15 scalpel blade.  The skin margins were undermined to an appropriate distance in all directions utilizing iris scissors.
O-Z Flap Text: The defect edges were debeveled with a #15 scalpel blade.  Given the location of the defect, shape of the defect and the proximity to free margins an O-Z flap was deemed most appropriate.  Using a sterile surgical marker, an appropriate transposition flap was drawn incorporating the defect and placing the expected incisions within the relaxed skin tension lines where possible. The area thus outlined was incised deep to adipose tissue with a #15 scalpel blade.  The skin margins were undermined to an appropriate distance in all directions utilizing iris scissors.
Double O-Z Flap Text: The defect edges were debeveled with a #15 scalpel blade.  Given the location of the defect, shape of the defect and the proximity to free margins a Double O-Z flap was deemed most appropriate.  Using a sterile surgical marker, an appropriate transposition flap was drawn incorporating the defect and placing the expected incisions within the relaxed skin tension lines where possible. The area thus outlined was incised deep to adipose tissue with a #15 scalpel blade.  The skin margins were undermined to an appropriate distance in all directions utilizing iris scissors.
V-Y Flap Text: The defect edges were debeveled with a #15 scalpel blade.  Given the location of the defect, shape of the defect and the proximity to free margins a V-Y flap was deemed most appropriate.  Using a sterile surgical marker, an appropriate advancement flap was drawn incorporating the defect and placing the expected incisions within the relaxed skin tension lines where possible.    The area thus outlined was incised deep to adipose tissue with a #15 scalpel blade.  The skin margins were undermined to an appropriate distance in all directions utilizing iris scissors.
Mercedes Flap Text: The defect edges were debeveled with a #15 scalpel blade.  Given the location of the defect, shape of the defect and the proximity to free margins a Mercedes flap was deemed most appropriate.  Using a sterile surgical marker, an appropriate advancement flap was drawn incorporating the defect and placing the expected incisions within the relaxed skin tension lines where possible. The area thus outlined was incised deep to adipose tissue with a #15 scalpel blade.  The skin margins were undermined to an appropriate distance in all directions utilizing iris scissors.
Modified Advancement Flap Text: The defect edges were debeveled with a #15 scalpel blade.  Given the location of the defect, shape of the defect and the proximity to free margins a modified advancement flap was deemed most appropriate.  Using a sterile surgical marker, an appropriate advancement flap was drawn incorporating the defect and placing the expected incisions within the relaxed skin tension lines where possible.    The area thus outlined was incised deep to adipose tissue with a #15 scalpel blade.  The skin margins were undermined to an appropriate distance in all directions utilizing iris scissors.
Mucosal Advancement Flap Text: Given the location of the defect, shape of the defect and the proximity to free margins a mucosal advancement flap was deemed most appropriate. Incisions were made with a 15 blade scalpel in the appropriate fashion along the cutaneous vermillion border and the mucosal lip. The remaining actinically damaged mucosal tissue was excised.  The mucosal advancement flap was then elevated to the gingival sulcus with care taken to preserve the neurovascular structures and advanced into the primary defect. Care was taken to ensure that precise realignment of the vermilion border was achieved.
Hatchet Flap Text: The defect edges were debeveled with a #15 scalpel blade.  Given the location of the defect, shape of the defect and the proximity to free margins a hatchet flap was deemed most appropriate.  Using a sterile surgical marker, an appropriate hatchet flap was drawn incorporating the defect and placing the expected incisions within the relaxed skin tension lines where possible.    The area thus outlined was incised deep to adipose tissue with a #15 scalpel blade.  The skin margins were undermined to an appropriate distance in all directions utilizing iris scissors.
Rotation Flap Text: The defect edges were debeveled with a #15 scalpel blade.  Given the location of the defect, shape of the defect and the proximity to free margins a rotation flap was deemed most appropriate.  Using a sterile surgical marker, an appropriate rotation flap was drawn incorporating the defect and placing the expected incisions within the relaxed skin tension lines where possible.    The area thus outlined was incised deep to adipose tissue with a #15 scalpel blade.  The skin margins were undermined to an appropriate distance in all directions utilizing iris scissors.
Spiral Flap Text: The defect edges were debeveled with a #15 scalpel blade.  Given the location of the defect, shape of the defect and the proximity to free margins a spiral flap was deemed most appropriate.  Using a sterile surgical marker, an appropriate rotation flap was drawn incorporating the defect and placing the expected incisions within the relaxed skin tension lines where possible. The area thus outlined was incised deep to adipose tissue with a #15 scalpel blade.  The skin margins were undermined to an appropriate distance in all directions utilizing iris scissors.
Staged Advancement Flap Text: The defect edges were debeveled with a #15 scalpel blade.  Given the location of the defect, shape of the defect and the proximity to free margins a staged advancement flap was deemed most appropriate.  Using a sterile surgical marker, an appropriate advancement flap was drawn incorporating the defect and placing the expected incisions within the relaxed skin tension lines where possible. The area thus outlined was incised deep to adipose tissue with a #15 scalpel blade.  The skin margins were undermined to an appropriate distance in all directions utilizing iris scissors.
Star Wedge Flap Text: The defect edges were debeveled with a #15 scalpel blade.  Given the location of the defect, shape of the defect and the proximity to free margins a star wedge flap was deemed most appropriate.  Using a sterile surgical marker, an appropriate rotation flap was drawn incorporating the defect and placing the expected incisions within the relaxed skin tension lines where possible. The area thus outlined was incised deep to adipose tissue with a #15 scalpel blade.  The skin margins were undermined to an appropriate distance in all directions utilizing iris scissors.
Transposition Flap Text: The defect edges were debeveled with a #15 scalpel blade.  Given the location of the defect and the proximity to free margins a transposition flap was deemed most appropriate.  Using a sterile surgical marker, an appropriate transposition flap was drawn incorporating the defect.    The area thus outlined was incised deep to adipose tissue with a #15 scalpel blade.  The skin margins were undermined to an appropriate distance in all directions utilizing iris scissors.
Muscle Hinge Flap Text: The defect edges were debeveled with a #15 scalpel blade.  Given the size, depth and location of the defect and the proximity to free margins a muscle hinge flap was deemed most appropriate.  Using a sterile surgical marker, an appropriate hinge flap was drawn incorporating the defect. The area thus outlined was incised with a #15 scalpel blade.  The skin margins were undermined to an appropriate distance in all directions utilizing iris scissors.
Mustarde Flap Text: The defect edges were debeveled with a #15 scalpel blade.  Given the size, depth and location of the defect and the proximity to free margins a Mustarde flap was deemed most appropriate.  Using a sterile surgical marker, an appropriate flap was drawn incorporating the defect. The area thus outlined was incised with a #15 scalpel blade.  The skin margins were undermined to an appropriate distance in all directions utilizing iris scissors.
Nasal Turnover Hinge Flap Text: The defect edges were debeveled with a #15 scalpel blade.  Given the size, depth, location of the defect and the defect being full thickness a nasal turnover hinge flap was deemed most appropriate.  Using a sterile surgical marker, an appropriate hinge flap was drawn incorporating the defect. The area thus outlined was incised with a #15 scalpel blade. The flap was designed to recreate the nasal mucosal lining and the alar rim. The skin margins were undermined to an appropriate distance in all directions utilizing iris scissors.
Nasalis-Muscle-Based Myocutaneous Island Pedicle Flap Text: Using a #15 blade, an incision was made around the donor flap to the level of the nasalis muscle. Wide lateral undermining was then performed in both the subcutaneous plane above the nasalis muscle, and in a submuscular plane just above periosteum. This allowed the formation of a free nasalis muscle axial pedicle (based on the angular artery) which was still attached to the actual cutaneous flap, increasing its mobility and vascular viability. Hemostasis was obtained with pinpoint electrocoagulation. The flap was mobilized into position and the pivotal anchor points positioned and stabilized with buried interrupted sutures. Subcutaneous and dermal tissues were closed in a multilayered fashion with sutures. Tissue redundancies were excised, and the epidermal edges were apposed without significant tension and sutured with sutures.
Orbicularis Oris Muscle Flap Text: The defect edges were debeveled with a #15 scalpel blade.  Given that the defect affected the competency of the oral sphincter an orbicularis oris muscle flap was deemed most appropriate to restore this competency and normal muscle function.  Using a sterile surgical marker, an appropriate flap was drawn incorporating the defect. The area thus outlined was incised with a #15 scalpel blade.
Melolabial Transposition Flap Text: The defect edges were debeveled with a #15 scalpel blade.  Given the location of the defect and the proximity to free margins a melolabial flap was deemed most appropriate.  Using a sterile surgical marker, an appropriate melolabial transposition flap was drawn incorporating the defect.    The area thus outlined was incised deep to adipose tissue with a #15 scalpel blade.  The skin margins were undermined to an appropriate distance in all directions utilizing iris scissors.
Rhombic Flap Text: The defect edges were debeveled with a #15 scalpel blade.  Given the location of the defect and the proximity to free margins a rhombic flap was deemed most appropriate.  Using a sterile surgical marker, an appropriate rhombic flap was drawn incorporating the defect.    The area thus outlined was incised deep to adipose tissue with a #15 scalpel blade.  The skin margins were undermined to an appropriate distance in all directions utilizing iris scissors.
Rhomboid Transposition Flap Text: The defect edges were debeveled with a #15 scalpel blade.  Given the location of the defect and the proximity to free margins a rhomboid transposition flap was deemed most appropriate.  Using a sterile surgical marker, an appropriate rhomboid flap was drawn incorporating the defect.    The area thus outlined was incised deep to adipose tissue with a #15 scalpel blade.  The skin margins were undermined to an appropriate distance in all directions utilizing iris scissors.
Bi-Rhombic Flap Text: The defect edges were debeveled with a #15 scalpel blade.  Given the location of the defect and the proximity to free margins a bi-rhombic flap was deemed most appropriate.  Using a sterile surgical marker, an appropriate rhombic flap was drawn incorporating the defect. The area thus outlined was incised deep to adipose tissue with a #15 scalpel blade.  The skin margins were undermined to an appropriate distance in all directions utilizing iris scissors.
Helical Rim Advancement Flap Text: The defect edges were debeveled with a #15 blade scalpel.  Given the location of the defect and the proximity to free margins (helical rim) a double helical rim advancement flap was deemed most appropriate.  Using a sterile surgical marker, the appropriate advancement flaps were drawn incorporating the defect and placing the expected incisions between the helical rim and antihelix where possible.  The area thus outlined was incised through and through with a #15 scalpel blade.  With a skin hook and iris scissors, the flaps were gently and sharply undermined and freed up.
Bilateral Helical Rim Advancement Flap Text: The defect edges were debeveled with a #15 blade scalpel.  Given the location of the defect and the proximity to free margins (helical rim) a bilateral helical rim advancement flap was deemed most appropriate.  Using a sterile surgical marker, the appropriate advancement flaps were drawn incorporating the defect and placing the expected incisions between the helical rim and antihelix where possible.  The area thus outlined was incised through and through with a #15 scalpel blade.  With a skin hook and iris scissors, the flaps were gently and sharply undermined and freed up.
Ear Star Wedge Flap Text: The defect edges were debeveled with a #15 blade scalpel.  Given the location of the defect and the proximity to free margins (helical rim) an ear star wedge flap was deemed most appropriate.  Using a sterile surgical marker, the appropriate flap was drawn incorporating the defect and placing the expected incisions between the helical rim and antihelix where possible.  The area thus outlined was incised through and through with a #15 scalpel blade.
Banner Transposition Flap Text: The defect edges were debeveled with a #15 scalpel blade.  Given the location of the defect and the proximity to free margins a Banner transposition flap was deemed most appropriate.  Using a sterile surgical marker, an appropriate flap drawn around the defect. The area thus outlined was incised deep to adipose tissue with a #15 scalpel blade.  The skin margins were undermined to an appropriate distance in all directions utilizing iris scissors.
Bilobed Flap Text: The defect edges were debeveled with a #15 scalpel blade.  Given the location of the defect and the proximity to free margins a bilobe flap was deemed most appropriate.  Using a sterile surgical marker, an appropriate bilobe flap drawn around the defect.    The area thus outlined was incised deep to adipose tissue with a #15 scalpel blade.  The skin margins were undermined to an appropriate distance in all directions utilizing iris scissors.
Bilobed Transposition Flap Text: The defect edges were debeveled with a #15 scalpel blade.  Given the location of the defect and the proximity to free margins a bilobed transposition flap was deemed most appropriate.  Using a sterile surgical marker, an appropriate bilobe flap drawn around the defect.    The area thus outlined was incised deep to adipose tissue with a #15 scalpel blade.  The skin margins were undermined to an appropriate distance in all directions utilizing iris scissors.
Trilobed Flap Text: The defect edges were debeveled with a #15 scalpel blade.  Given the location of the defect and the proximity to free margins a trilobed flap was deemed most appropriate.  Using a sterile surgical marker, an appropriate trilobed flap drawn around the defect.    The area thus outlined was incised deep to adipose tissue with a #15 scalpel blade.  The skin margins were undermined to an appropriate distance in all directions utilizing iris scissors.
Dorsal Nasal Flap Text: The defect edges were debeveled with a #15 scalpel blade.  Given the location of the defect and the proximity to free margins a dorsal nasal flap was deemed most appropriate.  Using a sterile surgical marker, an appropriate dorsal nasal flap was drawn around the defect.    The area thus outlined was incised deep to adipose tissue with a #15 scalpel blade.  The skin margins were undermined to an appropriate distance in all directions utilizing iris scissors.
Island Pedicle Flap Text: The defect edges were debeveled with a #15 scalpel blade.  Given the location of the defect, shape of the defect and the proximity to free margins an island pedicle advancement flap was deemed most appropriate.  Using a sterile surgical marker, an appropriate advancement flap was drawn incorporating the defect, outlining the appropriate donor tissue and placing the expected incisions within the relaxed skin tension lines where possible.    The area thus outlined was incised deep to adipose tissue with a #15 scalpel blade.  The skin margins were undermined to an appropriate distance in all directions around the primary defect and laterally outward around the island pedicle utilizing iris scissors.  There was minimal undermining beneath the pedicle flap.
Island Pedicle Flap With Canthal Suspension Text: The defect edges were debeveled with a #15 scalpel blade.  Given the location of the defect, shape of the defect and the proximity to free margins an island pedicle advancement flap was deemed most appropriate.  Using a sterile surgical marker, an appropriate advancement flap was drawn incorporating the defect, outlining the appropriate donor tissue and placing the expected incisions within the relaxed skin tension lines where possible. The area thus outlined was incised deep to adipose tissue with a #15 scalpel blade.  The skin margins were undermined to an appropriate distance in all directions around the primary defect and laterally outward around the island pedicle utilizing iris scissors.  There was minimal undermining beneath the pedicle flap. A suspension suture was placed in the canthal tendon to prevent tension and prevent ectropion.
Alar Island Pedicle Flap Text: The defect edges were debeveled with a #15 scalpel blade.  Given the location of the defect, shape of the defect and the proximity to the alar rim an island pedicle advancement flap was deemed most appropriate.  Using a sterile surgical marker, an appropriate advancement flap was drawn incorporating the defect, outlining the appropriate donor tissue and placing the expected incisions within the nasal ala running parallel to the alar rim. The area thus outlined was incised with a #15 scalpel blade.  The skin margins were undermined minimally to an appropriate distance in all directions around the primary defect and laterally outward around the island pedicle utilizing iris scissors.  There was minimal undermining beneath the pedicle flap.
Double Island Pedicle Flap Text: The defect edges were debeveled with a #15 scalpel blade.  Given the location of the defect, shape of the defect and the proximity to free margins a double island pedicle advancement flap was deemed most appropriate.  Using a sterile surgical marker, an appropriate advancement flap was drawn incorporating the defect, outlining the appropriate donor tissue and placing the expected incisions within the relaxed skin tension lines where possible.    The area thus outlined was incised deep to adipose tissue with a #15 scalpel blade.  The skin margins were undermined to an appropriate distance in all directions around the primary defect and laterally outward around the island pedicle utilizing iris scissors.  There was minimal undermining beneath the pedicle flap.
Island Pedicle Flap-Requiring Vessel Identification Text: The defect edges were debeveled with a #15 scalpel blade.  Given the location of the defect, shape of the defect and the proximity to free margins an island pedicle advancement flap was deemed most appropriate.  Using a sterile surgical marker, an appropriate advancement flap was drawn, based on the axial vessel mentioned above, incorporating the defect, outlining the appropriate donor tissue and placing the expected incisions within the relaxed skin tension lines where possible.    The area thus outlined was incised deep to adipose tissue with a #15 scalpel blade.  The skin margins were undermined to an appropriate distance in all directions around the primary defect and laterally outward around the island pedicle utilizing iris scissors.  There was minimal undermining beneath the pedicle flap.
Keystone Flap Text: The defect edges were debeveled with a #15 scalpel blade.  Given the location of the defect, shape of the defect a keystone flap was deemed most appropriate.  Using a sterile surgical marker, an appropriate keystone flap was drawn incorporating the defect, outlining the appropriate donor tissue and placing the expected incisions within the relaxed skin tension lines where possible. The area thus outlined was incised deep to adipose tissue with a #15 scalpel blade.  The skin margins were undermined to an appropriate distance in all directions around the primary defect and laterally outward around the flap utilizing iris scissors.
O-T Plasty Text: The defect edges were debeveled with a #15 scalpel blade.  Given the location of the defect, shape of the defect and the proximity to free margins an O-T plasty was deemed most appropriate.  Using a sterile surgical marker, an appropriate O-T plasty was drawn incorporating the defect and placing the expected incisions within the relaxed skin tension lines where possible.    The area thus outlined was incised deep to adipose tissue with a #15 scalpel blade.  The skin margins were undermined to an appropriate distance in all directions utilizing iris scissors.
O-Z Plasty Text: The defect edges were debeveled with a #15 scalpel blade.  Given the location of the defect, shape of the defect and the proximity to free margins an O-Z plasty (double transposition flap) was deemed most appropriate.  Using a sterile surgical marker, the appropriate transposition flaps were drawn incorporating the defect and placing the expected incisions within the relaxed skin tension lines where possible.    The area thus outlined was incised deep to adipose tissue with a #15 scalpel blade.  The skin margins were undermined to an appropriate distance in all directions utilizing iris scissors.  Hemostasis was achieved with electrocautery.  The flaps were then transposed into place, one clockwise and the other counterclockwise, and anchored with interrupted buried subcutaneous sutures.
Double O-Z Plasty Text: The defect edges were debeveled with a #15 scalpel blade.  Given the location of the defect, shape of the defect and the proximity to free margins a Double O-Z plasty (double transposition flap) was deemed most appropriate.  Using a sterile surgical marker, the appropriate transposition flaps were drawn incorporating the defect and placing the expected incisions within the relaxed skin tension lines where possible. The area thus outlined was incised deep to adipose tissue with a #15 scalpel blade.  The skin margins were undermined to an appropriate distance in all directions utilizing iris scissors.  Hemostasis was achieved with electrocautery.  The flaps were then transposed into place, one clockwise and the other counterclockwise, and anchored with interrupted buried subcutaneous sutures.
V-Y Plasty Text: The defect edges were debeveled with a #15 scalpel blade.  Given the location of the defect, shape of the defect and the proximity to free margins an V-Y advancement flap was deemed most appropriate.  Using a sterile surgical marker, an appropriate advancement flap was drawn incorporating the defect and placing the expected incisions within the relaxed skin tension lines where possible.    The area thus outlined was incised deep to adipose tissue with a #15 scalpel blade.  The skin margins were undermined to an appropriate distance in all directions utilizing iris scissors.
H Plasty Text: Given the location of the defect, shape of the defect and the proximity to free margins a H-plasty was deemed most appropriate for repair.  Using a sterile surgical marker, the appropriate advancement arms of the H-plasty were drawn incorporating the defect and placing the expected incisions within the relaxed skin tension lines where possible. The area thus outlined was incised deep to adipose tissue with a #15 scalpel blade. The skin margins were undermined to an appropriate distance in all directions utilizing iris scissors.  The opposing advancement arms were then advanced into place in opposite direction and anchored with interrupted buried subcutaneous sutures.
W Plasty Text: The lesion was extirpated to the level of the fat with a #15 scalpel blade.  Given the location of the defect, shape of the defect and the proximity to free margins a W-plasty was deemed most appropriate for repair.  Using a sterile surgical marker, the appropriate transposition arms of the W-plasty were drawn incorporating the defect and placing the expected incisions within the relaxed skin tension lines where possible.    The area thus outlined was incised deep to adipose tissue with a #15 scalpel blade.  The skin margins were undermined to an appropriate distance in all directions utilizing iris scissors.  The opposing transposition arms were then transposed into place in opposite direction and anchored with interrupted buried subcutaneous sutures.
Z Plasty Text: The lesion was extirpated to the level of the fat with a #15 scalpel blade.  Given the location of the defect, shape of the defect and the proximity to free margins a Z-plasty was deemed most appropriate for repair.  Using a sterile surgical marker, the appropriate transposition arms of the Z-plasty were drawn incorporating the defect and placing the expected incisions within the relaxed skin tension lines where possible.    The area thus outlined was incised deep to adipose tissue with a #15 scalpel blade.  The skin margins were undermined to an appropriate distance in all directions utilizing iris scissors.  The opposing transposition arms were then transposed into place in opposite direction and anchored with interrupted buried subcutaneous sutures.
Zygomaticofacial Flap Text: Given the location of the defect, shape of the defect and the proximity to free margins a zygomaticofacial flap was deemed most appropriate for repair.  Using a sterile surgical marker, the appropriate flap was drawn incorporating the defect and placing the expected incisions within the relaxed skin tension lines where possible. The area thus outlined was incised deep to adipose tissue with a #15 scalpel blade with preservation of a vascular pedicle.  The skin margins were undermined to an appropriate distance in all directions utilizing iris scissors.  The flap was then placed into the defect and anchored with interrupted buried subcutaneous sutures.
Cheek Interpolation Flap Text: A decision was made to reconstruct the defect utilizing an interpolation axial flap and a staged reconstruction.  A telfa template was made of the defect.  This telfa template was then used to outline the Cheek Interpolation flap.  The donor area for the pedicle flap was then injected with anesthesia.  The flap was excised through the skin and subcutaneous tissue down to the layer of the underlying musculature.  The interpolation flap was carefully excised within this deep plane to maintain its blood supply.  The edges of the donor site were undermined.   The donor site was closed in a primary fashion.  The pedicle was then rotated into position and sutured.  Once the tube was sutured into place, adequate blood supply was confirmed with blanching and refill.  The pedicle was then wrapped with xeroform gauze and dressed appropriately with a telfa and gauze bandage to ensure continued blood supply and protect the attached pedicle.
Cheek-To-Nose Interpolation Flap Text: A decision was made to reconstruct the defect utilizing an interpolation axial flap and a staged reconstruction.  A telfa template was made of the defect.  This telfa template was then used to outline the Cheek-To-Nose Interpolation flap.  The donor area for the pedicle flap was then injected with anesthesia.  The flap was excised through the skin and subcutaneous tissue down to the layer of the underlying musculature.  The interpolation flap was carefully excised within this deep plane to maintain its blood supply.  The edges of the donor site were undermined.   The donor site was closed in a primary fashion.  The pedicle was then rotated into position and sutured.  Once the tube was sutured into place, adequate blood supply was confirmed with blanching and refill.  The pedicle was then wrapped with xeroform gauze and dressed appropriately with a telfa and gauze bandage to ensure continued blood supply and protect the attached pedicle.
Interpolation Flap Text: A decision was made to reconstruct the defect utilizing an interpolation axial flap and a staged reconstruction.  A telfa template was made of the defect.  This telfa template was then used to outline the interpolation flap.  The donor area for the pedicle flap was then injected with anesthesia.  The flap was excised through the skin and subcutaneous tissue down to the layer of the underlying musculature.  The interpolation flap was carefully excised within this deep plane to maintain its blood supply.  The edges of the donor site were undermined.   The donor site was closed in a primary fashion.  The pedicle was then rotated into position and sutured.  Once the tube was sutured into place, adequate blood supply was confirmed with blanching and refill.  The pedicle was then wrapped with xeroform gauze and dressed appropriately with a telfa and gauze bandage to ensure continued blood supply and protect the attached pedicle.
Melolabial Interpolation Flap Text: A decision was made to reconstruct the defect utilizing an interpolation axial flap and a staged reconstruction.  A telfa template was made of the defect.  This telfa template was then used to outline the melolabial interpolation flap.  The donor area for the pedicle flap was then injected with anesthesia.  The flap was excised through the skin and subcutaneous tissue down to the layer of the underlying musculature.  The pedicle flap was carefully excised within this deep plane to maintain its blood supply.  The edges of the donor site were undermined.   The donor site was closed in a primary fashion.  The pedicle was then rotated into position and sutured.  Once the tube was sutured into place, adequate blood supply was confirmed with blanching and refill.  The pedicle was then wrapped with xeroform gauze and dressed appropriately with a telfa and gauze bandage to ensure continued blood supply and protect the attached pedicle.
Mastoid Interpolation Flap Text: A decision was made to reconstruct the defect utilizing an interpolation axial flap and a staged reconstruction.  A telfa template was made of the defect.  This telfa template was then used to outline the mastoid interpolation flap.  The donor area for the pedicle flap was then injected with anesthesia.  The flap was excised through the skin and subcutaneous tissue down to the layer of the underlying musculature.  The pedicle flap was carefully excised within this deep plane to maintain its blood supply.  The edges of the donor site were undermined.   The donor site was closed in a primary fashion.  The pedicle was then rotated into position and sutured.  Once the tube was sutured into place, adequate blood supply was confirmed with blanching and refill.  The pedicle was then wrapped with xeroform gauze and dressed appropriately with a telfa and gauze bandage to ensure continued blood supply and protect the attached pedicle.
Posterior Auricular Interpolation Flap Text: A decision was made to reconstruct the defect utilizing an interpolation axial flap and a staged reconstruction.  A telfa template was made of the defect.  This telfa template was then used to outline the posterior auricular interpolation flap.  The donor area for the pedicle flap was then injected with anesthesia.  The flap was excised through the skin and subcutaneous tissue down to the layer of the underlying musculature.  The pedicle flap was carefully excised within this deep plane to maintain its blood supply.  The edges of the donor site were undermined.   The donor site was closed in a primary fashion.  The pedicle was then rotated into position and sutured.  Once the tube was sutured into place, adequate blood supply was confirmed with blanching and refill.  The pedicle was then wrapped with xeroform gauze and dressed appropriately with a telfa and gauze bandage to ensure continued blood supply and protect the attached pedicle.
Paramedian Forehead Flap Text: A decision was made to reconstruct the defect utilizing an interpolation axial flap and a staged reconstruction.  A telfa template was made of the defect.  This telfa template was then used to outline the paramedian forehead pedicle flap.  The donor area for the pedicle flap was then injected with anesthesia.  The flap was excised through the skin and subcutaneous tissue down to the layer of the underlying musculature.  The pedicle flap was carefully excised within this deep plane to maintain its blood supply.  The edges of the donor site were undermined.   The donor site was closed in a primary fashion.  The pedicle was then rotated into position and sutured.  Once the tube was sutured into place, adequate blood supply was confirmed with blanching and refill.  The pedicle was then wrapped with xeroform gauze and dressed appropriately with a telfa and gauze bandage to ensure continued blood supply and protect the attached pedicle.
Lip Wedge Excision Repair Text: Given the location of the defect and the proximity to free margins a full thickness wedge repair was deemed most appropriate.  Using a sterile surgical marker, the appropriate repair was drawn incorporating the defect and placing the expected incisions perpendicular to the vermilion border.  The vermilion border was also meticulously outlined to ensure appropriate reapproximation during the repair.  The area thus outlined was incised through and through with a #15 scalpel blade.  The muscularis and dermis were reaproximated with deep sutures following hemostasis. Care was taken to realign the vermilion border before proceeding with the superficial closure.  Once the vermilion was realigned the superfical and mucosal closure was finished.
Ftsg Text: The defect edges were debeveled with a #15 scalpel blade.  Given the location of the defect, shape of the defect and the proximity to free margins a full thickness skin graft was deemed most appropriate.  Using a sterile surgical marker, the primary defect shape was transferred to the donor site. The area thus outlined was incised deep to adipose tissue with a #15 scalpel blade.  The harvested graft was then trimmed of adipose tissue until only dermis and epidermis was left.  The skin margins of the secondary defect were undermined to an appropriate distance in all directions utilizing iris scissors.  The secondary defect was closed with interrupted buried subcutaneous sutures.  The skin edges were then re-apposed with running  sutures.  The skin graft was then placed in the primary defect and oriented appropriately.
Split-Thickness Skin Graft Text: The defect edges were debeveled with a #15 scalpel blade.  Given the location of the defect, shape of the defect and the proximity to free margins a split thickness skin graft was deemed most appropriate.  Using a sterile surgical marker, the primary defect shape was transferred to the donor site. The split thickness graft was then harvested.  The skin graft was then placed in the primary defect and oriented appropriately.
Burow's Graft Text: The defect edges were debeveled with a #15 scalpel blade.  Given the location of the defect, shape of the defect, the proximity to free margins and the presence of a standing cone deformity a Burow's skin graft was deemed most appropriate. The standing cone was removed and this tissue was then trimmed to the shape of the primary defect. The adipose tissue was also removed until only dermis and epidermis were left.  The skin margins of the secondary defect were undermined to an appropriate distance in all directions utilizing iris scissors.  The secondary defect was closed with interrupted buried subcutaneous sutures.  The skin edges were then re-apposed with running  sutures.  The skin graft was then placed in the primary defect and oriented appropriately.
Cartilage Graft Text: The defect edges were debeveled with a #15 scalpel blade.  Given the location of the defect, shape of the defect, the fact the defect involved a full thickness cartilage defect a cartilage graft was deemed most appropriate.  An appropriate donor site was identified, cleansed, and anesthetized. The cartilage graft was then harvested and transferred to the recipient site, oriented appropriately and then sutured into place.  The secondary defect was then repaired using a primary closure.
Composite Graft Text: The defect edges were debeveled with a #15 scalpel blade.  Given the location of the defect, shape of the defect, the proximity to free margins and the fact the defect was full thickness a composite graft was deemed most appropriate.  The defect was outline and then transferred to the donor site.  A full thickness graft was then excised from the donor site. The graft was then placed in the primary defect, oriented appropriately and then sutured into place.  The secondary defect was then repaired using a primary closure.
Epidermal Autograft Text: The defect edges were debeveled with a #15 scalpel blade.  Given the location of the defect, shape of the defect and the proximity to free margins an epidermal autograft was deemed most appropriate.  Using a sterile surgical marker, the primary defect shape was transferred to the donor site. The epidermal graft was then harvested.  The skin graft was then placed in the primary defect and oriented appropriately.
Dermal Autograft Text: The defect edges were debeveled with a #15 scalpel blade.  Given the location of the defect, shape of the defect and the proximity to free margins a dermal autograft was deemed most appropriate.  Using a sterile surgical marker, the primary defect shape was transferred to the donor site. The area thus outlined was incised deep to adipose tissue with a #15 scalpel blade.  The harvested graft was then trimmed of adipose and epidermal tissue until only dermis was left.  The skin graft was then placed in the primary defect and oriented appropriately.
Skin Substitute Text: The defect edges were debeveled with a #15 scalpel blade.  Given the location of the defect, shape of the defect and the proximity to free margins a skin substitute graft was deemed most appropriate.  The graft material was trimmed to fit the size of the defect. The graft was then placed in the primary defect and oriented appropriately.
Tissue Cultured Epidermal Autograft Text: The defect edges were debeveled with a #15 scalpel blade.  Given the location of the defect, shape of the defect and the proximity to free margins a tissue cultured epidermal autograft was deemed most appropriate.  The graft was then trimmed to fit the size of the defect.  The graft was then placed in the primary defect and oriented appropriately.
Xenograft Text: The defect edges were debeveled with a #15 scalpel blade.  Given the location of the defect, shape of the defect and the proximity to free margins a xenograft was deemed most appropriate.  The graft was then trimmed to fit the size of the defect.  The graft was then placed in the primary defect and oriented appropriately.
Purse String (Intermediate) Text: Given the location of the defect and the characteristics of the surrounding skin a purse string intermediate closure was deemed most appropriate.  Undermining was performed circumferentially around the surgical defect.  A purse string suture was then placed and tightened.
Purse String (Simple) Text: Given the location of the defect and the characteristics of the surrounding skin a purse string simple closure was deemed most appropriate.  Undermining was performed circumferentially around the surgical defect.  A purse string suture was then placed and tightened.
Complex Repair And Single Advancement Flap Text: The defect edges were debeveled with a #15 scalpel blade.  The primary defect was closed partially with a complex linear closure.  Given the location of the remaining defect, shape of the defect and the proximity to free margins a single advancement flap was deemed most appropriate for complete closure of the defect.  Using a sterile surgical marker, an appropriate advancement flap was drawn incorporating the defect and placing the expected incisions within the relaxed skin tension lines where possible.    The area thus outlined was incised deep to adipose tissue with a #15 scalpel blade.  The skin margins were undermined to an appropriate distance in all directions utilizing iris scissors.
Complex Repair And Double Advancement Flap Text: The defect edges were debeveled with a #15 scalpel blade.  The primary defect was closed partially with a complex linear closure.  Given the location of the remaining defect, shape of the defect and the proximity to free margins a double advancement flap was deemed most appropriate for complete closure of the defect.  Using a sterile surgical marker, an appropriate advancement flap was drawn incorporating the defect and placing the expected incisions within the relaxed skin tension lines where possible.    The area thus outlined was incised deep to adipose tissue with a #15 scalpel blade.  The skin margins were undermined to an appropriate distance in all directions utilizing iris scissors.
Complex Repair And Modified Advancement Flap Text: The defect edges were debeveled with a #15 scalpel blade.  The primary defect was closed partially with a complex linear closure.  Given the location of the remaining defect, shape of the defect and the proximity to free margins a modified advancement flap was deemed most appropriate for complete closure of the defect.  Using a sterile surgical marker, an appropriate advancement flap was drawn incorporating the defect and placing the expected incisions within the relaxed skin tension lines where possible.    The area thus outlined was incised deep to adipose tissue with a #15 scalpel blade.  The skin margins were undermined to an appropriate distance in all directions utilizing iris scissors.
Complex Repair And A-T Advancement Flap Text: The defect edges were debeveled with a #15 scalpel blade.  The primary defect was closed partially with a complex linear closure.  Given the location of the remaining defect, shape of the defect and the proximity to free margins an A-T advancement flap was deemed most appropriate for complete closure of the defect.  Using a sterile surgical marker, an appropriate advancement flap was drawn incorporating the defect and placing the expected incisions within the relaxed skin tension lines where possible.    The area thus outlined was incised deep to adipose tissue with a #15 scalpel blade.  The skin margins were undermined to an appropriate distance in all directions utilizing iris scissors.
Complex Repair And O-T Advancement Flap Text: The defect edges were debeveled with a #15 scalpel blade.  The primary defect was closed partially with a complex linear closure.  Given the location of the remaining defect, shape of the defect and the proximity to free margins an O-T advancement flap was deemed most appropriate for complete closure of the defect.  Using a sterile surgical marker, an appropriate advancement flap was drawn incorporating the defect and placing the expected incisions within the relaxed skin tension lines where possible.    The area thus outlined was incised deep to adipose tissue with a #15 scalpel blade.  The skin margins were undermined to an appropriate distance in all directions utilizing iris scissors.
Complex Repair And O-L Flap Text: The defect edges were debeveled with a #15 scalpel blade.  The primary defect was closed partially with a complex linear closure.  Given the location of the remaining defect, shape of the defect and the proximity to free margins an O-L flap was deemed most appropriate for complete closure of the defect.  Using a sterile surgical marker, an appropriate flap was drawn incorporating the defect and placing the expected incisions within the relaxed skin tension lines where possible.    The area thus outlined was incised deep to adipose tissue with a #15 scalpel blade.  The skin margins were undermined to an appropriate distance in all directions utilizing iris scissors.
Complex Repair And Bilobe Flap Text: The defect edges were debeveled with a #15 scalpel blade.  The primary defect was closed partially with a complex linear closure.  Given the location of the remaining defect, shape of the defect and the proximity to free margins a bilobe flap was deemed most appropriate for complete closure of the defect.  Using a sterile surgical marker, an appropriate advancement flap was drawn incorporating the defect and placing the expected incisions within the relaxed skin tension lines where possible.    The area thus outlined was incised deep to adipose tissue with a #15 scalpel blade.  The skin margins were undermined to an appropriate distance in all directions utilizing iris scissors.
Complex Repair And Melolabial Flap Text: The defect edges were debeveled with a #15 scalpel blade.  The primary defect was closed partially with a complex linear closure.  Given the location of the remaining defect, shape of the defect and the proximity to free margins a melolabial flap was deemed most appropriate for complete closure of the defect.  Using a sterile surgical marker, an appropriate advancement flap was drawn incorporating the defect and placing the expected incisions within the relaxed skin tension lines where possible.    The area thus outlined was incised deep to adipose tissue with a #15 scalpel blade.  The skin margins were undermined to an appropriate distance in all directions utilizing iris scissors.
Complex Repair And Rotation Flap Text: The defect edges were debeveled with a #15 scalpel blade.  The primary defect was closed partially with a complex linear closure.  Given the location of the remaining defect, shape of the defect and the proximity to free margins a rotation flap was deemed most appropriate for complete closure of the defect.  Using a sterile surgical marker, an appropriate advancement flap was drawn incorporating the defect and placing the expected incisions within the relaxed skin tension lines where possible.    The area thus outlined was incised deep to adipose tissue with a #15 scalpel blade.  The skin margins were undermined to an appropriate distance in all directions utilizing iris scissors.
Complex Repair And Rhombic Flap Text: The defect edges were debeveled with a #15 scalpel blade.  The primary defect was closed partially with a complex linear closure.  Given the location of the remaining defect, shape of the defect and the proximity to free margins a rhombic flap was deemed most appropriate for complete closure of the defect.  Using a sterile surgical marker, an appropriate advancement flap was drawn incorporating the defect and placing the expected incisions within the relaxed skin tension lines where possible.    The area thus outlined was incised deep to adipose tissue with a #15 scalpel blade.  The skin margins were undermined to an appropriate distance in all directions utilizing iris scissors.
Complex Repair And Transposition Flap Text: The defect edges were debeveled with a #15 scalpel blade.  The primary defect was closed partially with a complex linear closure.  Given the location of the remaining defect, shape of the defect and the proximity to free margins a transposition flap was deemed most appropriate for complete closure of the defect.  Using a sterile surgical marker, an appropriate advancement flap was drawn incorporating the defect and placing the expected incisions within the relaxed skin tension lines where possible.    The area thus outlined was incised deep to adipose tissue with a #15 scalpel blade.  The skin margins were undermined to an appropriate distance in all directions utilizing iris scissors.
Complex Repair And V-Y Plasty Text: The defect edges were debeveled with a #15 scalpel blade.  The primary defect was closed partially with a complex linear closure.  Given the location of the remaining defect, shape of the defect and the proximity to free margins a V-Y plasty was deemed most appropriate for complete closure of the defect.  Using a sterile surgical marker, an appropriate advancement flap was drawn incorporating the defect and placing the expected incisions within the relaxed skin tension lines where possible.    The area thus outlined was incised deep to adipose tissue with a #15 scalpel blade.  The skin margins were undermined to an appropriate distance in all directions utilizing iris scissors.
Complex Repair And M Plasty Text: The defect edges were debeveled with a #15 scalpel blade.  The primary defect was closed partially with a complex linear closure.  Given the location of the remaining defect, shape of the defect and the proximity to free margins an M plasty was deemed most appropriate for complete closure of the defect.  Using a sterile surgical marker, an appropriate advancement flap was drawn incorporating the defect and placing the expected incisions within the relaxed skin tension lines where possible.    The area thus outlined was incised deep to adipose tissue with a #15 scalpel blade.  The skin margins were undermined to an appropriate distance in all directions utilizing iris scissors.
Complex Repair And Double M Plasty Text: The defect edges were debeveled with a #15 scalpel blade.  The primary defect was closed partially with a complex linear closure.  Given the location of the remaining defect, shape of the defect and the proximity to free margins a double M plasty was deemed most appropriate for complete closure of the defect.  Using a sterile surgical marker, an appropriate advancement flap was drawn incorporating the defect and placing the expected incisions within the relaxed skin tension lines where possible.    The area thus outlined was incised deep to adipose tissue with a #15 scalpel blade.  The skin margins were undermined to an appropriate distance in all directions utilizing iris scissors.
Complex Repair And W Plasty Text: The defect edges were debeveled with a #15 scalpel blade.  The primary defect was closed partially with a complex linear closure.  Given the location of the remaining defect, shape of the defect and the proximity to free margins a W plasty was deemed most appropriate for complete closure of the defect.  Using a sterile surgical marker, an appropriate advancement flap was drawn incorporating the defect and placing the expected incisions within the relaxed skin tension lines where possible.    The area thus outlined was incised deep to adipose tissue with a #15 scalpel blade.  The skin margins were undermined to an appropriate distance in all directions utilizing iris scissors.
Complex Repair And Z Plasty Text: The defect edges were debeveled with a #15 scalpel blade.  The primary defect was closed partially with a complex linear closure.  Given the location of the remaining defect, shape of the defect and the proximity to free margins a Z plasty was deemed most appropriate for complete closure of the defect.  Using a sterile surgical marker, an appropriate advancement flap was drawn incorporating the defect and placing the expected incisions within the relaxed skin tension lines where possible.    The area thus outlined was incised deep to adipose tissue with a #15 scalpel blade.  The skin margins were undermined to an appropriate distance in all directions utilizing iris scissors.
Complex Repair And Dorsal Nasal Flap Text: The defect edges were debeveled with a #15 scalpel blade.  The primary defect was closed partially with a complex linear closure.  Given the location of the remaining defect, shape of the defect and the proximity to free margins a dorsal nasal flap was deemed most appropriate for complete closure of the defect.  Using a sterile surgical marker, an appropriate flap was drawn incorporating the defect and placing the expected incisions within the relaxed skin tension lines where possible.    The area thus outlined was incised deep to adipose tissue with a #15 scalpel blade.  The skin margins were undermined to an appropriate distance in all directions utilizing iris scissors.
Complex Repair And Ftsg Text: The defect edges were debeveled with a #15 scalpel blade.  The primary defect was closed partially with a complex linear closure.  Given the location of the defect, shape of the defect and the proximity to free margins a full thickness skin graft was deemed most appropriate to repair the remaining defect.  The graft was trimmed to fit the size of the remaining defect.  The graft was then placed in the primary defect, oriented appropriately, and sutured into place.
Complex Repair And Burow's Graft Text: The defect edges were debeveled with a #15 scalpel blade.  The primary defect was closed partially with a complex linear closure.  Given the location of the defect, shape of the defect, the proximity to free margins and the presence of a standing cone deformity a Burow's graft was deemed most appropriate to repair the remaining defect.  The graft was trimmed to fit the size of the remaining defect.  The graft was then placed in the primary defect, oriented appropriately, and sutured into place.
Complex Repair And Split-Thickness Skin Graft Text: The defect edges were debeveled with a #15 scalpel blade.  The primary defect was closed partially with a complex linear closure.  Given the location of the defect, shape of the defect and the proximity to free margins a split thickness skin graft was deemed most appropriate to repair the remaining defect.  The graft was trimmed to fit the size of the remaining defect.  The graft was then placed in the primary defect, oriented appropriately, and sutured into place.
Complex Repair And Epidermal Autograft Text: The defect edges were debeveled with a #15 scalpel blade.  The primary defect was closed partially with a complex linear closure.  Given the location of the defect, shape of the defect and the proximity to free margins an epidermal autograft was deemed most appropriate to repair the remaining defect.  The graft was trimmed to fit the size of the remaining defect.  The graft was then placed in the primary defect, oriented appropriately, and sutured into place.
Complex Repair And Dermal Autograft Text: The defect edges were debeveled with a #15 scalpel blade.  The primary defect was closed partially with a complex linear closure.  Given the location of the defect, shape of the defect and the proximity to free margins an dermal autograft was deemed most appropriate to repair the remaining defect.  The graft was trimmed to fit the size of the remaining defect.  The graft was then placed in the primary defect, oriented appropriately, and sutured into place.
Complex Repair And Tissue Cultured Epidermal Autograft Text: The defect edges were debeveled with a #15 scalpel blade.  The primary defect was closed partially with a complex linear closure.  Given the location of the defect, shape of the defect and the proximity to free margins an tissue cultured epidermal autograft was deemed most appropriate to repair the remaining defect.  The graft was trimmed to fit the size of the remaining defect.  The graft was then placed in the primary defect, oriented appropriately, and sutured into place.
Complex Repair And Xenograft Text: The defect edges were debeveled with a #15 scalpel blade.  The primary defect was closed partially with a complex linear closure.  Given the location of the defect, shape of the defect and the proximity to free margins a xenograft was deemed most appropriate to repair the remaining defect.  The graft was trimmed to fit the size of the remaining defect.  The graft was then placed in the primary defect, oriented appropriately, and sutured into place.
Complex Repair And Skin Substitute Graft Text: The defect edges were debeveled with a #15 scalpel blade.  The primary defect was closed partially with a complex linear closure.  Given the location of the remaining defect, shape of the defect and the proximity to free margins a skin substitute graft was deemed most appropriate to repair the remaining defect.  The graft was trimmed to fit the size of the remaining defect.  The graft was then placed in the primary defect, oriented appropriately, and sutured into place.
Consent was obtained from the patient. The risks and benefits to therapy were discussed in detail. Specifically, the risks of infection, scarring, bleeding, prolonged wound healing, incomplete removal, allergy to anesthesia, nerve injury and recurrence were addressed. Prior to the procedure, the treatment site was clearly identified and confirmed by the patient. All components of Universal Protocol/PAUSE Rule completed.
Render Post-Care Instructions In Note?: yes
Post-Care Instructions: I reviewed with the patient in detail post-care instructions. Patient is not to engage in any heavy lifting, exercise, or swimming for the next 14 days. Should the patient develop any fevers, chills, bleeding, severe pain patient will contact the office immediately.
Home Suture Removal Text: Patient was provided a home suture removal kit and will remove their sutures at home.  If they have any questions or difficulties they will call the office.
Where Do You Want The Question To Include Opioid Counseling Located?: Case Summary Tab
Billing Type: Third-Party Bill
Information: Selecting Yes will display possible errors in your note based on the variables you have selected. This validation is only offered as a suggestion for you. PLEASE NOTE THAT THE VALIDATION TEXT WILL BE REMOVED WHEN YOU FINALIZE YOUR NOTE. IF YOU WANT TO FAX A PRELIMINARY NOTE YOU WILL NEED TO TOGGLE THIS TO 'NO' IF YOU DO NOT WANT IT IN YOUR FAXED NOTE.

## 2022-08-26 ENCOUNTER — APPOINTMENT (RX ONLY)
Dept: URBAN - METROPOLITAN AREA CLINIC 127 | Facility: CLINIC | Age: 47
Setting detail: DERMATOLOGY
End: 2022-08-26

## 2022-08-26 DIAGNOSIS — Z48.817 ENCOUNTER FOR SURGICAL AFTERCARE FOLLOWING SURGERY ON THE SKIN AND SUBCUTANEOUS TISSUE: ICD-10-CM

## 2022-08-26 PROCEDURE — ? POST-OP WOUND CHECK

## 2022-08-26 PROCEDURE — 99024 POSTOP FOLLOW-UP VISIT: CPT

## 2022-08-26 ASSESSMENT — LOCATION SIMPLE DESCRIPTION DERM: LOCATION SIMPLE: UPPER BACK

## 2022-08-26 ASSESSMENT — LOCATION ZONE DERM: LOCATION ZONE: TRUNK

## 2022-08-26 ASSESSMENT — LOCATION DETAILED DESCRIPTION DERM: LOCATION DETAILED: INFERIOR THORACIC SPINE

## 2022-08-26 NOTE — PROCEDURE: POST-OP WOUND CHECK
Detail Level: Detailed
Add 58269 Cpt? (Important Note: In 2017 The Use Of 41759 Is Being Tracked By Cms To Determine Future Global Period Reimbursement For Global Periods): yes

## 2022-08-31 ENCOUNTER — APPOINTMENT (RX ONLY)
Dept: URBAN - METROPOLITAN AREA CLINIC 375 | Facility: CLINIC | Age: 47
Setting detail: DERMATOLOGY
End: 2022-08-31

## 2022-08-31 DIAGNOSIS — L72.8 OTHER FOLLICULAR CYSTS OF THE SKIN AND SUBCUTANEOUS TISSUE: ICD-10-CM

## 2022-08-31 PROCEDURE — 11403 EXC TR-EXT B9+MARG 2.1-3CM: CPT

## 2022-08-31 PROCEDURE — 13101 CMPLX RPR TRUNK 2.6-7.5 CM: CPT

## 2022-08-31 PROCEDURE — ? EXCISION

## 2022-08-31 PROCEDURE — ? PRESCRIPTION

## 2022-08-31 RX ORDER — DOXYCYCLINE 100 MG/1
TABLET, FILM COATED ORAL
Qty: 6 | Refills: 0 | Status: ERX | COMMUNITY
Start: 2022-08-31

## 2022-08-31 RX ADMIN — DOXYCYCLINE: 100 TABLET, FILM COATED ORAL at 00:00

## 2022-08-31 ASSESSMENT — LOCATION SIMPLE DESCRIPTION DERM: LOCATION SIMPLE: UPPER BACK

## 2022-08-31 ASSESSMENT — LOCATION ZONE DERM: LOCATION ZONE: TRUNK

## 2022-08-31 ASSESSMENT — LOCATION DETAILED DESCRIPTION DERM: LOCATION DETAILED: INFERIOR THORACIC SPINE

## 2022-08-31 NOTE — PROCEDURE: EXCISION
Medical Necessity Information: It is in your best interest to select a reason for this procedure from the list below. All of these items fulfill various CMS LCD requirements except lesion extends to a margin.
Include Z78.9 (Other Specified Conditions Influencing Health Status) As An Associated Diagnosis?: No
Medical Necessity Clause: This procedure was medically necessary because the lesion that was treated was:
Lab: 6
Lab Facility: 3
X Size Of Lesion In Cm (Optional): 2
Size Of Margin In Cm: 0
Excision Method: Slit
Saucerization Depth: dermis and superficial adipose tissue
Repair Type: Complex
Suturegard Retention Suture: 2-0 Nylon
Retention Suture Bite Size: 3 mm
Length To Time In Minutes Device Was In Place: 10
Number Of Hemigard Strips Per Side: 1
Intermediate / Complex Repair - Final Wound Length In Cm: 5.3
Complex Requirements: Extensive Undermining Performed?: Yes
Undermining Type: Entire Wound
Debridement Text: The wound edges were debrided prior to proceeding with the closure to facilitate wound healing.
Helical Rim Text: The closure involved the helical rim.
Vermilion Border Text: The closure involved the vermilion border.
Nostril Rim Text: The closure involved the nostril rim.
Retention Suture Text: Retention sutures were placed to support the closure and prevent dehiscence.
Suture Removal: 14 days
Epidermal Closure Graft Donor Site (Optional): simple interrupted
Graft Donor Site Bandage (Optional-Leave Blank If You Don't Want In Note): Steri-strips and a pressure bandage were applied to the donor site.
Detail Level: Detailed
Excision Depth: adipose tissue
Scalpel Size: 15 blade
Anesthesia Type: 1% lidocaine with epinephrine
Hemostasis: Electrocautery
Estimated Blood Loss (Cc): minimal
Deep Sutures: 3-0 Monocryl
Epidermal Sutures: 3-0 Prolene
Wound Care: Petrolatum
Dressing: dry sterile dressing
Suturegard Intro: Intraoperative tissue expansion was performed, utilizing the SUTUREGARD device, in order to reduce wound tension.
Suturegard Body: The suture ends were repeatedly re-tightened and re-clamped to achieve the desired tissue expansion.
Hemigard Intro: Due to skin fragility and wound tension, it was decided to use HEMIGARD adhesive retention suture devices to permit a linear closure. The skin was cleaned and dried for a 6cm distance away from the wound. Excessive hair, if present, was removed to allow for adhesion.
Hemigard Postcare Instructions: The HEMIGARD strips are to remain completely dry for at least 5-7 days.
Complex Repair Preamble Text (Leave Blank If You Do Not Want): Extensive wide undermining was performed.
Intermediate Repair Preamble Text (Leave Blank If You Do Not Want): Undermining was performed with blunt dissection.
Fusiform Excision Additional Text (Leave Blank If You Do Not Want): The margin was drawn around the clinically apparent lesion.  A fusiform shape was then drawn on the skin incorporating the lesion and margins.  Incisions were then made along these lines to the appropriate tissue plane and the lesion was extirpated.
Eliptical Excision Additional Text (Leave Blank If You Do Not Want): The margin was drawn around the clinically apparent lesion.  An elliptical shape was then drawn on the skin incorporating the lesion and margins.  Incisions were then made along these lines to the appropriate tissue plane and the lesion was extirpated.
Saucerization Excision Additional Text (Leave Blank If You Do Not Want): The margin was drawn around the clinically apparent lesion.  Incisions were then made along these lines, in a tangential fashion, to the appropriate tissue plane and the lesion was extirpated.
Slit Excision Additional Text (Leave Blank If You Do Not Want): A linear line was drawn on the skin overlying the lesion. An incision was made slowly until the lesion was visualized.  Once visualized, the lesion was removed with blunt dissection.
Excisional Biopsy Additional Text (Leave Blank If You Do Not Want): The margin was drawn around the clinically apparent lesion. An elliptical shape was then drawn on the skin incorporating the lesion and margins.  Incisions were then made along these lines to the appropriate tissue plane and the lesion was extirpated.
Perilesional Excision Additional Text (Leave Blank If You Do Not Want): The margin was drawn around the clinically apparent lesion. Incisions were then made along these lines to the appropriate tissue plane and the lesion was extirpated.
Repair Performed By Another Provider Text (Leave Blank If You Do Not Want): After the tissue was excised the defect was repaired by another provider.
No Repair - Repaired With Adjacent Surgical Defect Text (Leave Blank If You Do Not Want): After the excision the defect was repaired concurrently with another surgical defect which was in close approximation.
Adjacent Tissue Transfer Text: The defect edges were debeveled with a #15 scalpel blade.  Given the location of the defect and the proximity to free margins an adjacent tissue transfer was deemed most appropriate.  Using a sterile surgical marker, an appropriate flap was drawn incorporating the defect and placing the expected incisions within the relaxed skin tension lines where possible.    The area thus outlined was incised deep to adipose tissue with a #15 scalpel blade.  The skin margins were undermined to an appropriate distance in all directions utilizing iris scissors.
Advancement Flap (Single) Text: The defect edges were debeveled with a #15 scalpel blade.  Given the location of the defect and the proximity to free margins a single advancement flap was deemed most appropriate.  Using a sterile surgical marker, an appropriate advancement flap was drawn incorporating the defect and placing the expected incisions within the relaxed skin tension lines where possible.    The area thus outlined was incised deep to adipose tissue with a #15 scalpel blade.  The skin margins were undermined to an appropriate distance in all directions utilizing iris scissors.
Advancement Flap (Double) Text: The defect edges were debeveled with a #15 scalpel blade.  Given the location of the defect and the proximity to free margins a double advancement flap was deemed most appropriate.  Using a sterile surgical marker, the appropriate advancement flaps were drawn incorporating the defect and placing the expected incisions within the relaxed skin tension lines where possible.    The area thus outlined was incised deep to adipose tissue with a #15 scalpel blade.  The skin margins were undermined to an appropriate distance in all directions utilizing iris scissors.
Burow's Advancement Flap Text: The defect edges were debeveled with a #15 scalpel blade.  Given the location of the defect and the proximity to free margins a Burow's advancement flap was deemed most appropriate.  Using a sterile surgical marker, the appropriate advancement flap was drawn incorporating the defect and placing the expected incisions within the relaxed skin tension lines where possible.    The area thus outlined was incised deep to adipose tissue with a #15 scalpel blade.  The skin margins were undermined to an appropriate distance in all directions utilizing iris scissors.
Chonodrocutaneous Helical Advancement Flap Text: The defect edges were debeveled with a #15 scalpel blade.  Given the location of the defect and the proximity to free margins a chondrocutaneous helical advancement flap was deemed most appropriate.  Using a sterile surgical marker, the appropriate advancement flap was drawn incorporating the defect and placing the expected incisions within the relaxed skin tension lines where possible.    The area thus outlined was incised deep to adipose tissue with a #15 scalpel blade.  The skin margins were undermined to an appropriate distance in all directions utilizing iris scissors.
Crescentic Advancement Flap Text: The defect edges were debeveled with a #15 scalpel blade.  Given the location of the defect and the proximity to free margins a crescentic advancement flap was deemed most appropriate.  Using a sterile surgical marker, the appropriate advancement flap was drawn incorporating the defect and placing the expected incisions within the relaxed skin tension lines where possible.    The area thus outlined was incised deep to adipose tissue with a #15 scalpel blade.  The skin margins were undermined to an appropriate distance in all directions utilizing iris scissors.
A-T Advancement Flap Text: The defect edges were debeveled with a #15 scalpel blade.  Given the location of the defect, shape of the defect and the proximity to free margins an A-T advancement flap was deemed most appropriate.  Using a sterile surgical marker, an appropriate advancement flap was drawn incorporating the defect and placing the expected incisions within the relaxed skin tension lines where possible.    The area thus outlined was incised deep to adipose tissue with a #15 scalpel blade.  The skin margins were undermined to an appropriate distance in all directions utilizing iris scissors.
O-T Advancement Flap Text: The defect edges were debeveled with a #15 scalpel blade.  Given the location of the defect, shape of the defect and the proximity to free margins an O-T advancement flap was deemed most appropriate.  Using a sterile surgical marker, an appropriate advancement flap was drawn incorporating the defect and placing the expected incisions within the relaxed skin tension lines where possible.    The area thus outlined was incised deep to adipose tissue with a #15 scalpel blade.  The skin margins were undermined to an appropriate distance in all directions utilizing iris scissors.
O-L Flap Text: The defect edges were debeveled with a #15 scalpel blade.  Given the location of the defect, shape of the defect and the proximity to free margins an O-L flap was deemed most appropriate.  Using a sterile surgical marker, an appropriate advancement flap was drawn incorporating the defect and placing the expected incisions within the relaxed skin tension lines where possible.    The area thus outlined was incised deep to adipose tissue with a #15 scalpel blade.  The skin margins were undermined to an appropriate distance in all directions utilizing iris scissors.
O-Z Flap Text: The defect edges were debeveled with a #15 scalpel blade.  Given the location of the defect, shape of the defect and the proximity to free margins an O-Z flap was deemed most appropriate.  Using a sterile surgical marker, an appropriate transposition flap was drawn incorporating the defect and placing the expected incisions within the relaxed skin tension lines where possible. The area thus outlined was incised deep to adipose tissue with a #15 scalpel blade.  The skin margins were undermined to an appropriate distance in all directions utilizing iris scissors.
Double O-Z Flap Text: The defect edges were debeveled with a #15 scalpel blade.  Given the location of the defect, shape of the defect and the proximity to free margins a Double O-Z flap was deemed most appropriate.  Using a sterile surgical marker, an appropriate transposition flap was drawn incorporating the defect and placing the expected incisions within the relaxed skin tension lines where possible. The area thus outlined was incised deep to adipose tissue with a #15 scalpel blade.  The skin margins were undermined to an appropriate distance in all directions utilizing iris scissors.
V-Y Flap Text: The defect edges were debeveled with a #15 scalpel blade.  Given the location of the defect, shape of the defect and the proximity to free margins a V-Y flap was deemed most appropriate.  Using a sterile surgical marker, an appropriate advancement flap was drawn incorporating the defect and placing the expected incisions within the relaxed skin tension lines where possible.    The area thus outlined was incised deep to adipose tissue with a #15 scalpel blade.  The skin margins were undermined to an appropriate distance in all directions utilizing iris scissors.
Mercedes Flap Text: The defect edges were debeveled with a #15 scalpel blade.  Given the location of the defect, shape of the defect and the proximity to free margins a Mercedes flap was deemed most appropriate.  Using a sterile surgical marker, an appropriate advancement flap was drawn incorporating the defect and placing the expected incisions within the relaxed skin tension lines where possible. The area thus outlined was incised deep to adipose tissue with a #15 scalpel blade.  The skin margins were undermined to an appropriate distance in all directions utilizing iris scissors.
Modified Advancement Flap Text: The defect edges were debeveled with a #15 scalpel blade.  Given the location of the defect, shape of the defect and the proximity to free margins a modified advancement flap was deemed most appropriate.  Using a sterile surgical marker, an appropriate advancement flap was drawn incorporating the defect and placing the expected incisions within the relaxed skin tension lines where possible.    The area thus outlined was incised deep to adipose tissue with a #15 scalpel blade.  The skin margins were undermined to an appropriate distance in all directions utilizing iris scissors.
Mucosal Advancement Flap Text: Given the location of the defect, shape of the defect and the proximity to free margins a mucosal advancement flap was deemed most appropriate. Incisions were made with a 15 blade scalpel in the appropriate fashion along the cutaneous vermillion border and the mucosal lip. The remaining actinically damaged mucosal tissue was excised.  The mucosal advancement flap was then elevated to the gingival sulcus with care taken to preserve the neurovascular structures and advanced into the primary defect. Care was taken to ensure that precise realignment of the vermilion border was achieved.
Hatchet Flap Text: The defect edges were debeveled with a #15 scalpel blade.  Given the location of the defect, shape of the defect and the proximity to free margins a hatchet flap was deemed most appropriate.  Using a sterile surgical marker, an appropriate hatchet flap was drawn incorporating the defect and placing the expected incisions within the relaxed skin tension lines where possible.    The area thus outlined was incised deep to adipose tissue with a #15 scalpel blade.  The skin margins were undermined to an appropriate distance in all directions utilizing iris scissors.
Rotation Flap Text: The defect edges were debeveled with a #15 scalpel blade.  Given the location of the defect, shape of the defect and the proximity to free margins a rotation flap was deemed most appropriate.  Using a sterile surgical marker, an appropriate rotation flap was drawn incorporating the defect and placing the expected incisions within the relaxed skin tension lines where possible.    The area thus outlined was incised deep to adipose tissue with a #15 scalpel blade.  The skin margins were undermined to an appropriate distance in all directions utilizing iris scissors.
Spiral Flap Text: The defect edges were debeveled with a #15 scalpel blade.  Given the location of the defect, shape of the defect and the proximity to free margins a spiral flap was deemed most appropriate.  Using a sterile surgical marker, an appropriate rotation flap was drawn incorporating the defect and placing the expected incisions within the relaxed skin tension lines where possible. The area thus outlined was incised deep to adipose tissue with a #15 scalpel blade.  The skin margins were undermined to an appropriate distance in all directions utilizing iris scissors.
Staged Advancement Flap Text: The defect edges were debeveled with a #15 scalpel blade.  Given the location of the defect, shape of the defect and the proximity to free margins a staged advancement flap was deemed most appropriate.  Using a sterile surgical marker, an appropriate advancement flap was drawn incorporating the defect and placing the expected incisions within the relaxed skin tension lines where possible. The area thus outlined was incised deep to adipose tissue with a #15 scalpel blade.  The skin margins were undermined to an appropriate distance in all directions utilizing iris scissors.
Star Wedge Flap Text: The defect edges were debeveled with a #15 scalpel blade.  Given the location of the defect, shape of the defect and the proximity to free margins a star wedge flap was deemed most appropriate.  Using a sterile surgical marker, an appropriate rotation flap was drawn incorporating the defect and placing the expected incisions within the relaxed skin tension lines where possible. The area thus outlined was incised deep to adipose tissue with a #15 scalpel blade.  The skin margins were undermined to an appropriate distance in all directions utilizing iris scissors.
Transposition Flap Text: The defect edges were debeveled with a #15 scalpel blade.  Given the location of the defect and the proximity to free margins a transposition flap was deemed most appropriate.  Using a sterile surgical marker, an appropriate transposition flap was drawn incorporating the defect.    The area thus outlined was incised deep to adipose tissue with a #15 scalpel blade.  The skin margins were undermined to an appropriate distance in all directions utilizing iris scissors.
Muscle Hinge Flap Text: The defect edges were debeveled with a #15 scalpel blade.  Given the size, depth and location of the defect and the proximity to free margins a muscle hinge flap was deemed most appropriate.  Using a sterile surgical marker, an appropriate hinge flap was drawn incorporating the defect. The area thus outlined was incised with a #15 scalpel blade.  The skin margins were undermined to an appropriate distance in all directions utilizing iris scissors.
Mustarde Flap Text: The defect edges were debeveled with a #15 scalpel blade.  Given the size, depth and location of the defect and the proximity to free margins a Mustarde flap was deemed most appropriate.  Using a sterile surgical marker, an appropriate flap was drawn incorporating the defect. The area thus outlined was incised with a #15 scalpel blade.  The skin margins were undermined to an appropriate distance in all directions utilizing iris scissors.
Nasal Turnover Hinge Flap Text: The defect edges were debeveled with a #15 scalpel blade.  Given the size, depth, location of the defect and the defect being full thickness a nasal turnover hinge flap was deemed most appropriate.  Using a sterile surgical marker, an appropriate hinge flap was drawn incorporating the defect. The area thus outlined was incised with a #15 scalpel blade. The flap was designed to recreate the nasal mucosal lining and the alar rim. The skin margins were undermined to an appropriate distance in all directions utilizing iris scissors.
Nasalis-Muscle-Based Myocutaneous Island Pedicle Flap Text: Using a #15 blade, an incision was made around the donor flap to the level of the nasalis muscle. Wide lateral undermining was then performed in both the subcutaneous plane above the nasalis muscle, and in a submuscular plane just above periosteum. This allowed the formation of a free nasalis muscle axial pedicle (based on the angular artery) which was still attached to the actual cutaneous flap, increasing its mobility and vascular viability. Hemostasis was obtained with pinpoint electrocoagulation. The flap was mobilized into position and the pivotal anchor points positioned and stabilized with buried interrupted sutures. Subcutaneous and dermal tissues were closed in a multilayered fashion with sutures. Tissue redundancies were excised, and the epidermal edges were apposed without significant tension and sutured with sutures.
Orbicularis Oris Muscle Flap Text: The defect edges were debeveled with a #15 scalpel blade.  Given that the defect affected the competency of the oral sphincter an orbicularis oris muscle flap was deemed most appropriate to restore this competency and normal muscle function.  Using a sterile surgical marker, an appropriate flap was drawn incorporating the defect. The area thus outlined was incised with a #15 scalpel blade.
Melolabial Transposition Flap Text: The defect edges were debeveled with a #15 scalpel blade.  Given the location of the defect and the proximity to free margins a melolabial flap was deemed most appropriate.  Using a sterile surgical marker, an appropriate melolabial transposition flap was drawn incorporating the defect.    The area thus outlined was incised deep to adipose tissue with a #15 scalpel blade.  The skin margins were undermined to an appropriate distance in all directions utilizing iris scissors.
Rhombic Flap Text: The defect edges were debeveled with a #15 scalpel blade.  Given the location of the defect and the proximity to free margins a rhombic flap was deemed most appropriate.  Using a sterile surgical marker, an appropriate rhombic flap was drawn incorporating the defect.    The area thus outlined was incised deep to adipose tissue with a #15 scalpel blade.  The skin margins were undermined to an appropriate distance in all directions utilizing iris scissors.
Rhomboid Transposition Flap Text: The defect edges were debeveled with a #15 scalpel blade.  Given the location of the defect and the proximity to free margins a rhomboid transposition flap was deemed most appropriate.  Using a sterile surgical marker, an appropriate rhomboid flap was drawn incorporating the defect.    The area thus outlined was incised deep to adipose tissue with a #15 scalpel blade.  The skin margins were undermined to an appropriate distance in all directions utilizing iris scissors.
Bi-Rhombic Flap Text: The defect edges were debeveled with a #15 scalpel blade.  Given the location of the defect and the proximity to free margins a bi-rhombic flap was deemed most appropriate.  Using a sterile surgical marker, an appropriate rhombic flap was drawn incorporating the defect. The area thus outlined was incised deep to adipose tissue with a #15 scalpel blade.  The skin margins were undermined to an appropriate distance in all directions utilizing iris scissors.
Helical Rim Advancement Flap Text: The defect edges were debeveled with a #15 blade scalpel.  Given the location of the defect and the proximity to free margins (helical rim) a double helical rim advancement flap was deemed most appropriate.  Using a sterile surgical marker, the appropriate advancement flaps were drawn incorporating the defect and placing the expected incisions between the helical rim and antihelix where possible.  The area thus outlined was incised through and through with a #15 scalpel blade.  With a skin hook and iris scissors, the flaps were gently and sharply undermined and freed up.
Bilateral Helical Rim Advancement Flap Text: The defect edges were debeveled with a #15 blade scalpel.  Given the location of the defect and the proximity to free margins (helical rim) a bilateral helical rim advancement flap was deemed most appropriate.  Using a sterile surgical marker, the appropriate advancement flaps were drawn incorporating the defect and placing the expected incisions between the helical rim and antihelix where possible.  The area thus outlined was incised through and through with a #15 scalpel blade.  With a skin hook and iris scissors, the flaps were gently and sharply undermined and freed up.
Ear Star Wedge Flap Text: The defect edges were debeveled with a #15 blade scalpel.  Given the location of the defect and the proximity to free margins (helical rim) an ear star wedge flap was deemed most appropriate.  Using a sterile surgical marker, the appropriate flap was drawn incorporating the defect and placing the expected incisions between the helical rim and antihelix where possible.  The area thus outlined was incised through and through with a #15 scalpel blade.
Banner Transposition Flap Text: The defect edges were debeveled with a #15 scalpel blade.  Given the location of the defect and the proximity to free margins a Banner transposition flap was deemed most appropriate.  Using a sterile surgical marker, an appropriate flap drawn around the defect. The area thus outlined was incised deep to adipose tissue with a #15 scalpel blade.  The skin margins were undermined to an appropriate distance in all directions utilizing iris scissors.
Bilobed Flap Text: The defect edges were debeveled with a #15 scalpel blade.  Given the location of the defect and the proximity to free margins a bilobe flap was deemed most appropriate.  Using a sterile surgical marker, an appropriate bilobe flap drawn around the defect.    The area thus outlined was incised deep to adipose tissue with a #15 scalpel blade.  The skin margins were undermined to an appropriate distance in all directions utilizing iris scissors.
Bilobed Transposition Flap Text: The defect edges were debeveled with a #15 scalpel blade.  Given the location of the defect and the proximity to free margins a bilobed transposition flap was deemed most appropriate.  Using a sterile surgical marker, an appropriate bilobe flap drawn around the defect.    The area thus outlined was incised deep to adipose tissue with a #15 scalpel blade.  The skin margins were undermined to an appropriate distance in all directions utilizing iris scissors.
Trilobed Flap Text: The defect edges were debeveled with a #15 scalpel blade.  Given the location of the defect and the proximity to free margins a trilobed flap was deemed most appropriate.  Using a sterile surgical marker, an appropriate trilobed flap drawn around the defect.    The area thus outlined was incised deep to adipose tissue with a #15 scalpel blade.  The skin margins were undermined to an appropriate distance in all directions utilizing iris scissors.
Dorsal Nasal Flap Text: The defect edges were debeveled with a #15 scalpel blade.  Given the location of the defect and the proximity to free margins a dorsal nasal flap was deemed most appropriate.  Using a sterile surgical marker, an appropriate dorsal nasal flap was drawn around the defect.    The area thus outlined was incised deep to adipose tissue with a #15 scalpel blade.  The skin margins were undermined to an appropriate distance in all directions utilizing iris scissors.
Island Pedicle Flap Text: The defect edges were debeveled with a #15 scalpel blade.  Given the location of the defect, shape of the defect and the proximity to free margins an island pedicle advancement flap was deemed most appropriate.  Using a sterile surgical marker, an appropriate advancement flap was drawn incorporating the defect, outlining the appropriate donor tissue and placing the expected incisions within the relaxed skin tension lines where possible.    The area thus outlined was incised deep to adipose tissue with a #15 scalpel blade.  The skin margins were undermined to an appropriate distance in all directions around the primary defect and laterally outward around the island pedicle utilizing iris scissors.  There was minimal undermining beneath the pedicle flap.
Island Pedicle Flap With Canthal Suspension Text: The defect edges were debeveled with a #15 scalpel blade.  Given the location of the defect, shape of the defect and the proximity to free margins an island pedicle advancement flap was deemed most appropriate.  Using a sterile surgical marker, an appropriate advancement flap was drawn incorporating the defect, outlining the appropriate donor tissue and placing the expected incisions within the relaxed skin tension lines where possible. The area thus outlined was incised deep to adipose tissue with a #15 scalpel blade.  The skin margins were undermined to an appropriate distance in all directions around the primary defect and laterally outward around the island pedicle utilizing iris scissors.  There was minimal undermining beneath the pedicle flap. A suspension suture was placed in the canthal tendon to prevent tension and prevent ectropion.
Alar Island Pedicle Flap Text: The defect edges were debeveled with a #15 scalpel blade.  Given the location of the defect, shape of the defect and the proximity to the alar rim an island pedicle advancement flap was deemed most appropriate.  Using a sterile surgical marker, an appropriate advancement flap was drawn incorporating the defect, outlining the appropriate donor tissue and placing the expected incisions within the nasal ala running parallel to the alar rim. The area thus outlined was incised with a #15 scalpel blade.  The skin margins were undermined minimally to an appropriate distance in all directions around the primary defect and laterally outward around the island pedicle utilizing iris scissors.  There was minimal undermining beneath the pedicle flap.
Double Island Pedicle Flap Text: The defect edges were debeveled with a #15 scalpel blade.  Given the location of the defect, shape of the defect and the proximity to free margins a double island pedicle advancement flap was deemed most appropriate.  Using a sterile surgical marker, an appropriate advancement flap was drawn incorporating the defect, outlining the appropriate donor tissue and placing the expected incisions within the relaxed skin tension lines where possible.    The area thus outlined was incised deep to adipose tissue with a #15 scalpel blade.  The skin margins were undermined to an appropriate distance in all directions around the primary defect and laterally outward around the island pedicle utilizing iris scissors.  There was minimal undermining beneath the pedicle flap.
Island Pedicle Flap-Requiring Vessel Identification Text: The defect edges were debeveled with a #15 scalpel blade.  Given the location of the defect, shape of the defect and the proximity to free margins an island pedicle advancement flap was deemed most appropriate.  Using a sterile surgical marker, an appropriate advancement flap was drawn, based on the axial vessel mentioned above, incorporating the defect, outlining the appropriate donor tissue and placing the expected incisions within the relaxed skin tension lines where possible.    The area thus outlined was incised deep to adipose tissue with a #15 scalpel blade.  The skin margins were undermined to an appropriate distance in all directions around the primary defect and laterally outward around the island pedicle utilizing iris scissors.  There was minimal undermining beneath the pedicle flap.
Keystone Flap Text: The defect edges were debeveled with a #15 scalpel blade.  Given the location of the defect, shape of the defect a keystone flap was deemed most appropriate.  Using a sterile surgical marker, an appropriate keystone flap was drawn incorporating the defect, outlining the appropriate donor tissue and placing the expected incisions within the relaxed skin tension lines where possible. The area thus outlined was incised deep to adipose tissue with a #15 scalpel blade.  The skin margins were undermined to an appropriate distance in all directions around the primary defect and laterally outward around the flap utilizing iris scissors.
O-T Plasty Text: The defect edges were debeveled with a #15 scalpel blade.  Given the location of the defect, shape of the defect and the proximity to free margins an O-T plasty was deemed most appropriate.  Using a sterile surgical marker, an appropriate O-T plasty was drawn incorporating the defect and placing the expected incisions within the relaxed skin tension lines where possible.    The area thus outlined was incised deep to adipose tissue with a #15 scalpel blade.  The skin margins were undermined to an appropriate distance in all directions utilizing iris scissors.
O-Z Plasty Text: The defect edges were debeveled with a #15 scalpel blade.  Given the location of the defect, shape of the defect and the proximity to free margins an O-Z plasty (double transposition flap) was deemed most appropriate.  Using a sterile surgical marker, the appropriate transposition flaps were drawn incorporating the defect and placing the expected incisions within the relaxed skin tension lines where possible.    The area thus outlined was incised deep to adipose tissue with a #15 scalpel blade.  The skin margins were undermined to an appropriate distance in all directions utilizing iris scissors.  Hemostasis was achieved with electrocautery.  The flaps were then transposed into place, one clockwise and the other counterclockwise, and anchored with interrupted buried subcutaneous sutures.
Double O-Z Plasty Text: The defect edges were debeveled with a #15 scalpel blade.  Given the location of the defect, shape of the defect and the proximity to free margins a Double O-Z plasty (double transposition flap) was deemed most appropriate.  Using a sterile surgical marker, the appropriate transposition flaps were drawn incorporating the defect and placing the expected incisions within the relaxed skin tension lines where possible. The area thus outlined was incised deep to adipose tissue with a #15 scalpel blade.  The skin margins were undermined to an appropriate distance in all directions utilizing iris scissors.  Hemostasis was achieved with electrocautery.  The flaps were then transposed into place, one clockwise and the other counterclockwise, and anchored with interrupted buried subcutaneous sutures.
V-Y Plasty Text: The defect edges were debeveled with a #15 scalpel blade.  Given the location of the defect, shape of the defect and the proximity to free margins an V-Y advancement flap was deemed most appropriate.  Using a sterile surgical marker, an appropriate advancement flap was drawn incorporating the defect and placing the expected incisions within the relaxed skin tension lines where possible.    The area thus outlined was incised deep to adipose tissue with a #15 scalpel blade.  The skin margins were undermined to an appropriate distance in all directions utilizing iris scissors.
H Plasty Text: Given the location of the defect, shape of the defect and the proximity to free margins a H-plasty was deemed most appropriate for repair.  Using a sterile surgical marker, the appropriate advancement arms of the H-plasty were drawn incorporating the defect and placing the expected incisions within the relaxed skin tension lines where possible. The area thus outlined was incised deep to adipose tissue with a #15 scalpel blade. The skin margins were undermined to an appropriate distance in all directions utilizing iris scissors.  The opposing advancement arms were then advanced into place in opposite direction and anchored with interrupted buried subcutaneous sutures.
W Plasty Text: The lesion was extirpated to the level of the fat with a #15 scalpel blade.  Given the location of the defect, shape of the defect and the proximity to free margins a W-plasty was deemed most appropriate for repair.  Using a sterile surgical marker, the appropriate transposition arms of the W-plasty were drawn incorporating the defect and placing the expected incisions within the relaxed skin tension lines where possible.    The area thus outlined was incised deep to adipose tissue with a #15 scalpel blade.  The skin margins were undermined to an appropriate distance in all directions utilizing iris scissors.  The opposing transposition arms were then transposed into place in opposite direction and anchored with interrupted buried subcutaneous sutures.
Z Plasty Text: The lesion was extirpated to the level of the fat with a #15 scalpel blade.  Given the location of the defect, shape of the defect and the proximity to free margins a Z-plasty was deemed most appropriate for repair.  Using a sterile surgical marker, the appropriate transposition arms of the Z-plasty were drawn incorporating the defect and placing the expected incisions within the relaxed skin tension lines where possible.    The area thus outlined was incised deep to adipose tissue with a #15 scalpel blade.  The skin margins were undermined to an appropriate distance in all directions utilizing iris scissors.  The opposing transposition arms were then transposed into place in opposite direction and anchored with interrupted buried subcutaneous sutures.
Zygomaticofacial Flap Text: Given the location of the defect, shape of the defect and the proximity to free margins a zygomaticofacial flap was deemed most appropriate for repair.  Using a sterile surgical marker, the appropriate flap was drawn incorporating the defect and placing the expected incisions within the relaxed skin tension lines where possible. The area thus outlined was incised deep to adipose tissue with a #15 scalpel blade with preservation of a vascular pedicle.  The skin margins were undermined to an appropriate distance in all directions utilizing iris scissors.  The flap was then placed into the defect and anchored with interrupted buried subcutaneous sutures.
Cheek Interpolation Flap Text: A decision was made to reconstruct the defect utilizing an interpolation axial flap and a staged reconstruction.  A telfa template was made of the defect.  This telfa template was then used to outline the Cheek Interpolation flap.  The donor area for the pedicle flap was then injected with anesthesia.  The flap was excised through the skin and subcutaneous tissue down to the layer of the underlying musculature.  The interpolation flap was carefully excised within this deep plane to maintain its blood supply.  The edges of the donor site were undermined.   The donor site was closed in a primary fashion.  The pedicle was then rotated into position and sutured.  Once the tube was sutured into place, adequate blood supply was confirmed with blanching and refill.  The pedicle was then wrapped with xeroform gauze and dressed appropriately with a telfa and gauze bandage to ensure continued blood supply and protect the attached pedicle.
Cheek-To-Nose Interpolation Flap Text: A decision was made to reconstruct the defect utilizing an interpolation axial flap and a staged reconstruction.  A telfa template was made of the defect.  This telfa template was then used to outline the Cheek-To-Nose Interpolation flap.  The donor area for the pedicle flap was then injected with anesthesia.  The flap was excised through the skin and subcutaneous tissue down to the layer of the underlying musculature.  The interpolation flap was carefully excised within this deep plane to maintain its blood supply.  The edges of the donor site were undermined.   The donor site was closed in a primary fashion.  The pedicle was then rotated into position and sutured.  Once the tube was sutured into place, adequate blood supply was confirmed with blanching and refill.  The pedicle was then wrapped with xeroform gauze and dressed appropriately with a telfa and gauze bandage to ensure continued blood supply and protect the attached pedicle.
Interpolation Flap Text: A decision was made to reconstruct the defect utilizing an interpolation axial flap and a staged reconstruction.  A telfa template was made of the defect.  This telfa template was then used to outline the interpolation flap.  The donor area for the pedicle flap was then injected with anesthesia.  The flap was excised through the skin and subcutaneous tissue down to the layer of the underlying musculature.  The interpolation flap was carefully excised within this deep plane to maintain its blood supply.  The edges of the donor site were undermined.   The donor site was closed in a primary fashion.  The pedicle was then rotated into position and sutured.  Once the tube was sutured into place, adequate blood supply was confirmed with blanching and refill.  The pedicle was then wrapped with xeroform gauze and dressed appropriately with a telfa and gauze bandage to ensure continued blood supply and protect the attached pedicle.
Melolabial Interpolation Flap Text: A decision was made to reconstruct the defect utilizing an interpolation axial flap and a staged reconstruction.  A telfa template was made of the defect.  This telfa template was then used to outline the melolabial interpolation flap.  The donor area for the pedicle flap was then injected with anesthesia.  The flap was excised through the skin and subcutaneous tissue down to the layer of the underlying musculature.  The pedicle flap was carefully excised within this deep plane to maintain its blood supply.  The edges of the donor site were undermined.   The donor site was closed in a primary fashion.  The pedicle was then rotated into position and sutured.  Once the tube was sutured into place, adequate blood supply was confirmed with blanching and refill.  The pedicle was then wrapped with xeroform gauze and dressed appropriately with a telfa and gauze bandage to ensure continued blood supply and protect the attached pedicle.
Mastoid Interpolation Flap Text: A decision was made to reconstruct the defect utilizing an interpolation axial flap and a staged reconstruction.  A telfa template was made of the defect.  This telfa template was then used to outline the mastoid interpolation flap.  The donor area for the pedicle flap was then injected with anesthesia.  The flap was excised through the skin and subcutaneous tissue down to the layer of the underlying musculature.  The pedicle flap was carefully excised within this deep plane to maintain its blood supply.  The edges of the donor site were undermined.   The donor site was closed in a primary fashion.  The pedicle was then rotated into position and sutured.  Once the tube was sutured into place, adequate blood supply was confirmed with blanching and refill.  The pedicle was then wrapped with xeroform gauze and dressed appropriately with a telfa and gauze bandage to ensure continued blood supply and protect the attached pedicle.
Posterior Auricular Interpolation Flap Text: A decision was made to reconstruct the defect utilizing an interpolation axial flap and a staged reconstruction.  A telfa template was made of the defect.  This telfa template was then used to outline the posterior auricular interpolation flap.  The donor area for the pedicle flap was then injected with anesthesia.  The flap was excised through the skin and subcutaneous tissue down to the layer of the underlying musculature.  The pedicle flap was carefully excised within this deep plane to maintain its blood supply.  The edges of the donor site were undermined.   The donor site was closed in a primary fashion.  The pedicle was then rotated into position and sutured.  Once the tube was sutured into place, adequate blood supply was confirmed with blanching and refill.  The pedicle was then wrapped with xeroform gauze and dressed appropriately with a telfa and gauze bandage to ensure continued blood supply and protect the attached pedicle.
Paramedian Forehead Flap Text: A decision was made to reconstruct the defect utilizing an interpolation axial flap and a staged reconstruction.  A telfa template was made of the defect.  This telfa template was then used to outline the paramedian forehead pedicle flap.  The donor area for the pedicle flap was then injected with anesthesia.  The flap was excised through the skin and subcutaneous tissue down to the layer of the underlying musculature.  The pedicle flap was carefully excised within this deep plane to maintain its blood supply.  The edges of the donor site were undermined.   The donor site was closed in a primary fashion.  The pedicle was then rotated into position and sutured.  Once the tube was sutured into place, adequate blood supply was confirmed with blanching and refill.  The pedicle was then wrapped with xeroform gauze and dressed appropriately with a telfa and gauze bandage to ensure continued blood supply and protect the attached pedicle.
Lip Wedge Excision Repair Text: Given the location of the defect and the proximity to free margins a full thickness wedge repair was deemed most appropriate.  Using a sterile surgical marker, the appropriate repair was drawn incorporating the defect and placing the expected incisions perpendicular to the vermilion border.  The vermilion border was also meticulously outlined to ensure appropriate reapproximation during the repair.  The area thus outlined was incised through and through with a #15 scalpel blade.  The muscularis and dermis were reaproximated with deep sutures following hemostasis. Care was taken to realign the vermilion border before proceeding with the superficial closure.  Once the vermilion was realigned the superfical and mucosal closure was finished.
Ftsg Text: The defect edges were debeveled with a #15 scalpel blade.  Given the location of the defect, shape of the defect and the proximity to free margins a full thickness skin graft was deemed most appropriate.  Using a sterile surgical marker, the primary defect shape was transferred to the donor site. The area thus outlined was incised deep to adipose tissue with a #15 scalpel blade.  The harvested graft was then trimmed of adipose tissue until only dermis and epidermis was left.  The skin margins of the secondary defect were undermined to an appropriate distance in all directions utilizing iris scissors.  The secondary defect was closed with interrupted buried subcutaneous sutures.  The skin edges were then re-apposed with running  sutures.  The skin graft was then placed in the primary defect and oriented appropriately.
Split-Thickness Skin Graft Text: The defect edges were debeveled with a #15 scalpel blade.  Given the location of the defect, shape of the defect and the proximity to free margins a split thickness skin graft was deemed most appropriate.  Using a sterile surgical marker, the primary defect shape was transferred to the donor site. The split thickness graft was then harvested.  The skin graft was then placed in the primary defect and oriented appropriately.
Burow's Graft Text: The defect edges were debeveled with a #15 scalpel blade.  Given the location of the defect, shape of the defect, the proximity to free margins and the presence of a standing cone deformity a Burow's skin graft was deemed most appropriate. The standing cone was removed and this tissue was then trimmed to the shape of the primary defect. The adipose tissue was also removed until only dermis and epidermis were left.  The skin margins of the secondary defect were undermined to an appropriate distance in all directions utilizing iris scissors.  The secondary defect was closed with interrupted buried subcutaneous sutures.  The skin edges were then re-apposed with running  sutures.  The skin graft was then placed in the primary defect and oriented appropriately.
Cartilage Graft Text: The defect edges were debeveled with a #15 scalpel blade.  Given the location of the defect, shape of the defect, the fact the defect involved a full thickness cartilage defect a cartilage graft was deemed most appropriate.  An appropriate donor site was identified, cleansed, and anesthetized. The cartilage graft was then harvested and transferred to the recipient site, oriented appropriately and then sutured into place.  The secondary defect was then repaired using a primary closure.
Composite Graft Text: The defect edges were debeveled with a #15 scalpel blade.  Given the location of the defect, shape of the defect, the proximity to free margins and the fact the defect was full thickness a composite graft was deemed most appropriate.  The defect was outline and then transferred to the donor site.  A full thickness graft was then excised from the donor site. The graft was then placed in the primary defect, oriented appropriately and then sutured into place.  The secondary defect was then repaired using a primary closure.
Epidermal Autograft Text: The defect edges were debeveled with a #15 scalpel blade.  Given the location of the defect, shape of the defect and the proximity to free margins an epidermal autograft was deemed most appropriate.  Using a sterile surgical marker, the primary defect shape was transferred to the donor site. The epidermal graft was then harvested.  The skin graft was then placed in the primary defect and oriented appropriately.
Dermal Autograft Text: The defect edges were debeveled with a #15 scalpel blade.  Given the location of the defect, shape of the defect and the proximity to free margins a dermal autograft was deemed most appropriate.  Using a sterile surgical marker, the primary defect shape was transferred to the donor site. The area thus outlined was incised deep to adipose tissue with a #15 scalpel blade.  The harvested graft was then trimmed of adipose and epidermal tissue until only dermis was left.  The skin graft was then placed in the primary defect and oriented appropriately.
Skin Substitute Text: The defect edges were debeveled with a #15 scalpel blade.  Given the location of the defect, shape of the defect and the proximity to free margins a skin substitute graft was deemed most appropriate.  The graft material was trimmed to fit the size of the defect. The graft was then placed in the primary defect and oriented appropriately.
Tissue Cultured Epidermal Autograft Text: The defect edges were debeveled with a #15 scalpel blade.  Given the location of the defect, shape of the defect and the proximity to free margins a tissue cultured epidermal autograft was deemed most appropriate.  The graft was then trimmed to fit the size of the defect.  The graft was then placed in the primary defect and oriented appropriately.
Xenograft Text: The defect edges were debeveled with a #15 scalpel blade.  Given the location of the defect, shape of the defect and the proximity to free margins a xenograft was deemed most appropriate.  The graft was then trimmed to fit the size of the defect.  The graft was then placed in the primary defect and oriented appropriately.
Purse String (Intermediate) Text: Given the location of the defect and the characteristics of the surrounding skin a purse string intermediate closure was deemed most appropriate.  Undermining was performed circumferentially around the surgical defect.  A purse string suture was then placed and tightened.
Purse String (Simple) Text: Given the location of the defect and the characteristics of the surrounding skin a purse string simple closure was deemed most appropriate.  Undermining was performed circumferentially around the surgical defect.  A purse string suture was then placed and tightened.
Complex Repair And Single Advancement Flap Text: The defect edges were debeveled with a #15 scalpel blade.  The primary defect was closed partially with a complex linear closure.  Given the location of the remaining defect, shape of the defect and the proximity to free margins a single advancement flap was deemed most appropriate for complete closure of the defect.  Using a sterile surgical marker, an appropriate advancement flap was drawn incorporating the defect and placing the expected incisions within the relaxed skin tension lines where possible.    The area thus outlined was incised deep to adipose tissue with a #15 scalpel blade.  The skin margins were undermined to an appropriate distance in all directions utilizing iris scissors.
Complex Repair And Double Advancement Flap Text: The defect edges were debeveled with a #15 scalpel blade.  The primary defect was closed partially with a complex linear closure.  Given the location of the remaining defect, shape of the defect and the proximity to free margins a double advancement flap was deemed most appropriate for complete closure of the defect.  Using a sterile surgical marker, an appropriate advancement flap was drawn incorporating the defect and placing the expected incisions within the relaxed skin tension lines where possible.    The area thus outlined was incised deep to adipose tissue with a #15 scalpel blade.  The skin margins were undermined to an appropriate distance in all directions utilizing iris scissors.
Complex Repair And Modified Advancement Flap Text: The defect edges were debeveled with a #15 scalpel blade.  The primary defect was closed partially with a complex linear closure.  Given the location of the remaining defect, shape of the defect and the proximity to free margins a modified advancement flap was deemed most appropriate for complete closure of the defect.  Using a sterile surgical marker, an appropriate advancement flap was drawn incorporating the defect and placing the expected incisions within the relaxed skin tension lines where possible.    The area thus outlined was incised deep to adipose tissue with a #15 scalpel blade.  The skin margins were undermined to an appropriate distance in all directions utilizing iris scissors.
Complex Repair And A-T Advancement Flap Text: The defect edges were debeveled with a #15 scalpel blade.  The primary defect was closed partially with a complex linear closure.  Given the location of the remaining defect, shape of the defect and the proximity to free margins an A-T advancement flap was deemed most appropriate for complete closure of the defect.  Using a sterile surgical marker, an appropriate advancement flap was drawn incorporating the defect and placing the expected incisions within the relaxed skin tension lines where possible.    The area thus outlined was incised deep to adipose tissue with a #15 scalpel blade.  The skin margins were undermined to an appropriate distance in all directions utilizing iris scissors.
Complex Repair And O-T Advancement Flap Text: The defect edges were debeveled with a #15 scalpel blade.  The primary defect was closed partially with a complex linear closure.  Given the location of the remaining defect, shape of the defect and the proximity to free margins an O-T advancement flap was deemed most appropriate for complete closure of the defect.  Using a sterile surgical marker, an appropriate advancement flap was drawn incorporating the defect and placing the expected incisions within the relaxed skin tension lines where possible.    The area thus outlined was incised deep to adipose tissue with a #15 scalpel blade.  The skin margins were undermined to an appropriate distance in all directions utilizing iris scissors.
Complex Repair And O-L Flap Text: The defect edges were debeveled with a #15 scalpel blade.  The primary defect was closed partially with a complex linear closure.  Given the location of the remaining defect, shape of the defect and the proximity to free margins an O-L flap was deemed most appropriate for complete closure of the defect.  Using a sterile surgical marker, an appropriate flap was drawn incorporating the defect and placing the expected incisions within the relaxed skin tension lines where possible.    The area thus outlined was incised deep to adipose tissue with a #15 scalpel blade.  The skin margins were undermined to an appropriate distance in all directions utilizing iris scissors.
Complex Repair And Bilobe Flap Text: The defect edges were debeveled with a #15 scalpel blade.  The primary defect was closed partially with a complex linear closure.  Given the location of the remaining defect, shape of the defect and the proximity to free margins a bilobe flap was deemed most appropriate for complete closure of the defect.  Using a sterile surgical marker, an appropriate advancement flap was drawn incorporating the defect and placing the expected incisions within the relaxed skin tension lines where possible.    The area thus outlined was incised deep to adipose tissue with a #15 scalpel blade.  The skin margins were undermined to an appropriate distance in all directions utilizing iris scissors.
Complex Repair And Melolabial Flap Text: The defect edges were debeveled with a #15 scalpel blade.  The primary defect was closed partially with a complex linear closure.  Given the location of the remaining defect, shape of the defect and the proximity to free margins a melolabial flap was deemed most appropriate for complete closure of the defect.  Using a sterile surgical marker, an appropriate advancement flap was drawn incorporating the defect and placing the expected incisions within the relaxed skin tension lines where possible.    The area thus outlined was incised deep to adipose tissue with a #15 scalpel blade.  The skin margins were undermined to an appropriate distance in all directions utilizing iris scissors.
Complex Repair And Rotation Flap Text: The defect edges were debeveled with a #15 scalpel blade.  The primary defect was closed partially with a complex linear closure.  Given the location of the remaining defect, shape of the defect and the proximity to free margins a rotation flap was deemed most appropriate for complete closure of the defect.  Using a sterile surgical marker, an appropriate advancement flap was drawn incorporating the defect and placing the expected incisions within the relaxed skin tension lines where possible.    The area thus outlined was incised deep to adipose tissue with a #15 scalpel blade.  The skin margins were undermined to an appropriate distance in all directions utilizing iris scissors.
Complex Repair And Rhombic Flap Text: The defect edges were debeveled with a #15 scalpel blade.  The primary defect was closed partially with a complex linear closure.  Given the location of the remaining defect, shape of the defect and the proximity to free margins a rhombic flap was deemed most appropriate for complete closure of the defect.  Using a sterile surgical marker, an appropriate advancement flap was drawn incorporating the defect and placing the expected incisions within the relaxed skin tension lines where possible.    The area thus outlined was incised deep to adipose tissue with a #15 scalpel blade.  The skin margins were undermined to an appropriate distance in all directions utilizing iris scissors.
Complex Repair And Transposition Flap Text: The defect edges were debeveled with a #15 scalpel blade.  The primary defect was closed partially with a complex linear closure.  Given the location of the remaining defect, shape of the defect and the proximity to free margins a transposition flap was deemed most appropriate for complete closure of the defect.  Using a sterile surgical marker, an appropriate advancement flap was drawn incorporating the defect and placing the expected incisions within the relaxed skin tension lines where possible.    The area thus outlined was incised deep to adipose tissue with a #15 scalpel blade.  The skin margins were undermined to an appropriate distance in all directions utilizing iris scissors.
Complex Repair And V-Y Plasty Text: The defect edges were debeveled with a #15 scalpel blade.  The primary defect was closed partially with a complex linear closure.  Given the location of the remaining defect, shape of the defect and the proximity to free margins a V-Y plasty was deemed most appropriate for complete closure of the defect.  Using a sterile surgical marker, an appropriate advancement flap was drawn incorporating the defect and placing the expected incisions within the relaxed skin tension lines where possible.    The area thus outlined was incised deep to adipose tissue with a #15 scalpel blade.  The skin margins were undermined to an appropriate distance in all directions utilizing iris scissors.
Complex Repair And M Plasty Text: The defect edges were debeveled with a #15 scalpel blade.  The primary defect was closed partially with a complex linear closure.  Given the location of the remaining defect, shape of the defect and the proximity to free margins an M plasty was deemed most appropriate for complete closure of the defect.  Using a sterile surgical marker, an appropriate advancement flap was drawn incorporating the defect and placing the expected incisions within the relaxed skin tension lines where possible.    The area thus outlined was incised deep to adipose tissue with a #15 scalpel blade.  The skin margins were undermined to an appropriate distance in all directions utilizing iris scissors.
Complex Repair And Double M Plasty Text: The defect edges were debeveled with a #15 scalpel blade.  The primary defect was closed partially with a complex linear closure.  Given the location of the remaining defect, shape of the defect and the proximity to free margins a double M plasty was deemed most appropriate for complete closure of the defect.  Using a sterile surgical marker, an appropriate advancement flap was drawn incorporating the defect and placing the expected incisions within the relaxed skin tension lines where possible.    The area thus outlined was incised deep to adipose tissue with a #15 scalpel blade.  The skin margins were undermined to an appropriate distance in all directions utilizing iris scissors.
Complex Repair And W Plasty Text: The defect edges were debeveled with a #15 scalpel blade.  The primary defect was closed partially with a complex linear closure.  Given the location of the remaining defect, shape of the defect and the proximity to free margins a W plasty was deemed most appropriate for complete closure of the defect.  Using a sterile surgical marker, an appropriate advancement flap was drawn incorporating the defect and placing the expected incisions within the relaxed skin tension lines where possible.    The area thus outlined was incised deep to adipose tissue with a #15 scalpel blade.  The skin margins were undermined to an appropriate distance in all directions utilizing iris scissors.
Complex Repair And Z Plasty Text: The defect edges were debeveled with a #15 scalpel blade.  The primary defect was closed partially with a complex linear closure.  Given the location of the remaining defect, shape of the defect and the proximity to free margins a Z plasty was deemed most appropriate for complete closure of the defect.  Using a sterile surgical marker, an appropriate advancement flap was drawn incorporating the defect and placing the expected incisions within the relaxed skin tension lines where possible.    The area thus outlined was incised deep to adipose tissue with a #15 scalpel blade.  The skin margins were undermined to an appropriate distance in all directions utilizing iris scissors.
Complex Repair And Dorsal Nasal Flap Text: The defect edges were debeveled with a #15 scalpel blade.  The primary defect was closed partially with a complex linear closure.  Given the location of the remaining defect, shape of the defect and the proximity to free margins a dorsal nasal flap was deemed most appropriate for complete closure of the defect.  Using a sterile surgical marker, an appropriate flap was drawn incorporating the defect and placing the expected incisions within the relaxed skin tension lines where possible.    The area thus outlined was incised deep to adipose tissue with a #15 scalpel blade.  The skin margins were undermined to an appropriate distance in all directions utilizing iris scissors.
Complex Repair And Ftsg Text: The defect edges were debeveled with a #15 scalpel blade.  The primary defect was closed partially with a complex linear closure.  Given the location of the defect, shape of the defect and the proximity to free margins a full thickness skin graft was deemed most appropriate to repair the remaining defect.  The graft was trimmed to fit the size of the remaining defect.  The graft was then placed in the primary defect, oriented appropriately, and sutured into place.
Complex Repair And Burow's Graft Text: The defect edges were debeveled with a #15 scalpel blade.  The primary defect was closed partially with a complex linear closure.  Given the location of the defect, shape of the defect, the proximity to free margins and the presence of a standing cone deformity a Burow's graft was deemed most appropriate to repair the remaining defect.  The graft was trimmed to fit the size of the remaining defect.  The graft was then placed in the primary defect, oriented appropriately, and sutured into place.
Complex Repair And Split-Thickness Skin Graft Text: The defect edges were debeveled with a #15 scalpel blade.  The primary defect was closed partially with a complex linear closure.  Given the location of the defect, shape of the defect and the proximity to free margins a split thickness skin graft was deemed most appropriate to repair the remaining defect.  The graft was trimmed to fit the size of the remaining defect.  The graft was then placed in the primary defect, oriented appropriately, and sutured into place.
Complex Repair And Epidermal Autograft Text: The defect edges were debeveled with a #15 scalpel blade.  The primary defect was closed partially with a complex linear closure.  Given the location of the defect, shape of the defect and the proximity to free margins an epidermal autograft was deemed most appropriate to repair the remaining defect.  The graft was trimmed to fit the size of the remaining defect.  The graft was then placed in the primary defect, oriented appropriately, and sutured into place.
Complex Repair And Dermal Autograft Text: The defect edges were debeveled with a #15 scalpel blade.  The primary defect was closed partially with a complex linear closure.  Given the location of the defect, shape of the defect and the proximity to free margins an dermal autograft was deemed most appropriate to repair the remaining defect.  The graft was trimmed to fit the size of the remaining defect.  The graft was then placed in the primary defect, oriented appropriately, and sutured into place.
Complex Repair And Tissue Cultured Epidermal Autograft Text: The defect edges were debeveled with a #15 scalpel blade.  The primary defect was closed partially with a complex linear closure.  Given the location of the defect, shape of the defect and the proximity to free margins an tissue cultured epidermal autograft was deemed most appropriate to repair the remaining defect.  The graft was trimmed to fit the size of the remaining defect.  The graft was then placed in the primary defect, oriented appropriately, and sutured into place.
Complex Repair And Xenograft Text: The defect edges were debeveled with a #15 scalpel blade.  The primary defect was closed partially with a complex linear closure.  Given the location of the defect, shape of the defect and the proximity to free margins a xenograft was deemed most appropriate to repair the remaining defect.  The graft was trimmed to fit the size of the remaining defect.  The graft was then placed in the primary defect, oriented appropriately, and sutured into place.
Complex Repair And Skin Substitute Graft Text: The defect edges were debeveled with a #15 scalpel blade.  The primary defect was closed partially with a complex linear closure.  Given the location of the remaining defect, shape of the defect and the proximity to free margins a skin substitute graft was deemed most appropriate to repair the remaining defect.  The graft was trimmed to fit the size of the remaining defect.  The graft was then placed in the primary defect, oriented appropriately, and sutured into place.
Consent was obtained from the patient. The risks and benefits to therapy were discussed in detail. Specifically, the risks of infection, scarring, bleeding, prolonged wound healing, incomplete removal, allergy to anesthesia, nerve injury and recurrence were addressed. Prior to the procedure, the treatment site was clearly identified and confirmed by the patient. All components of Universal Protocol/PAUSE Rule completed.
Post-Care Instructions: I reviewed with the patient in detail post-care instructions. Patient is not to engage in any heavy lifting, exercise, or swimming for the next 14 days. Should the patient develop any fevers, chills, bleeding, severe pain patient will contact the office immediately.
Home Suture Removal Text: Patient was provided a home suture removal kit and will remove their sutures at home.  If they have any questions or difficulties they will call the office.
Where Do You Want The Question To Include Opioid Counseling Located?: Case Summary Tab
Billing Type: Third-Party Bill
Information: Selecting Yes will display possible errors in your note based on the variables you have selected. This validation is only offered as a suggestion for you. PLEASE NOTE THAT THE VALIDATION TEXT WILL BE REMOVED WHEN YOU FINALIZE YOUR NOTE. IF YOU WANT TO FAX A PRELIMINARY NOTE YOU WILL NEED TO TOGGLE THIS TO 'NO' IF YOU DO NOT WANT IT IN YOUR FAXED NOTE.

## 2022-09-12 ENCOUNTER — APPOINTMENT (RX ONLY)
Dept: URBAN - METROPOLITAN AREA CLINIC 127 | Facility: CLINIC | Age: 47
Setting detail: DERMATOLOGY
End: 2022-09-12

## 2022-09-12 DIAGNOSIS — Z48.817 ENCOUNTER FOR SURGICAL AFTERCARE FOLLOWING SURGERY ON THE SKIN AND SUBCUTANEOUS TISSUE: ICD-10-CM

## 2022-09-12 PROCEDURE — 99024 POSTOP FOLLOW-UP VISIT: CPT

## 2022-09-12 PROCEDURE — ? POST-OP WOUND CHECK

## 2022-09-12 ASSESSMENT — LOCATION SIMPLE DESCRIPTION DERM: LOCATION SIMPLE: UPPER BACK

## 2022-09-12 ASSESSMENT — LOCATION ZONE DERM: LOCATION ZONE: TRUNK

## 2022-09-12 ASSESSMENT — LOCATION DETAILED DESCRIPTION DERM: LOCATION DETAILED: INFERIOR THORACIC SPINE

## 2022-09-12 NOTE — PROCEDURE: POST-OP WOUND CHECK
Detail Level: Detailed
Add 11330 Cpt? (Important Note: In 2017 The Use Of 52986 Is Being Tracked By Cms To Determine Future Global Period Reimbursement For Global Periods): yes
Wound Evaluated By: [facility]

## 2022-11-25 ENCOUNTER — DOCUMENTATION (OUTPATIENT)
Dept: GASTROENTEROLOGY | Facility: CLINIC | Age: 47
End: 2022-11-25

## 2022-11-25 RX ORDER — AZITHROMYCIN 250 MG/1
TABLET, FILM COATED ORAL
Qty: 6 TABLET | Refills: 0 | Status: SHIPPED | OUTPATIENT
Start: 2022-11-25

## 2023-10-11 ENCOUNTER — DOCUMENTATION (OUTPATIENT)
Dept: GASTROENTEROLOGY | Facility: CLINIC | Age: 48
End: 2023-10-11
Payer: COMMERCIAL

## 2024-05-10 RX ORDER — OMEPRAZOLE 40 MG/1
40 CAPSULE, DELAYED RELEASE ORAL EVERY 12 HOURS SCHEDULED
Qty: 60 CAPSULE | Refills: 0 | OUTPATIENT
Start: 2024-05-10

## 2024-06-14 RX ORDER — OMEPRAZOLE 40 MG/1
40 CAPSULE, DELAYED RELEASE ORAL EVERY 12 HOURS SCHEDULED
Qty: 60 CAPSULE | Refills: 0 | OUTPATIENT
Start: 2024-06-14

## 2024-06-18 ENCOUNTER — TRANSCRIBE ORDERS (OUTPATIENT)
Dept: ADMINISTRATIVE | Facility: HOSPITAL | Age: 49
End: 2024-06-18
Payer: COMMERCIAL

## 2024-06-18 DIAGNOSIS — I50.22 CHRONIC SYSTOLIC HEART FAILURE: Primary | ICD-10-CM

## 2024-06-29 ENCOUNTER — DOCUMENTATION (OUTPATIENT)
Dept: GASTROENTEROLOGY | Facility: CLINIC | Age: 49
End: 2024-06-29
Payer: COMMERCIAL

## 2024-06-29 RX ORDER — OMEPRAZOLE 40 MG/1
40 CAPSULE, DELAYED RELEASE ORAL DAILY
Qty: 30 CAPSULE | Refills: 11 | Status: SHIPPED | OUTPATIENT
Start: 2024-06-29

## 2024-07-30 ENCOUNTER — HOSPITAL ENCOUNTER (OUTPATIENT)
Dept: CARDIOLOGY | Facility: HOSPITAL | Age: 49
Discharge: HOME | End: 2024-07-30
Admitting: PHYSICIAN ASSISTANT
Payer: COMMERCIAL

## 2024-07-30 DIAGNOSIS — I50.22 CHRONIC SYSTOLIC HEART FAILURE: ICD-10-CM

## 2024-07-30 PROCEDURE — 93306 TTE W/DOPPLER COMPLETE: CPT

## 2024-07-30 PROCEDURE — 25510000001 PERFLUTREN PROTEIN A MICROSPH SUSPENSION: Performed by: INTERNAL MEDICINE

## 2024-07-30 RX ADMIN — HUMAN ALBUMIN MICROSPHERES AND PERFLUTREN 0.66 MG: 10; .22 INJECTION, SOLUTION INTRAVENOUS at 09:30

## 2024-08-02 LAB
BH CV ECHO MEAS - AO ROOT DIAM: 2.8 CM
BH CV ECHO MEAS - EDV(CUBED): 110.6 ML
BH CV ECHO MEAS - EDV(MOD-SP2): 117 ML
BH CV ECHO MEAS - EDV(MOD-SP4): 121 ML
BH CV ECHO MEAS - EF(MOD-BP): 56.9 %
BH CV ECHO MEAS - EF(MOD-SP2): 53.4 %
BH CV ECHO MEAS - EF(MOD-SP4): 59 %
BH CV ECHO MEAS - ESV(CUBED): 50.7 ML
BH CV ECHO MEAS - ESV(MOD-SP2): 54.5 ML
BH CV ECHO MEAS - ESV(MOD-SP4): 49.6 ML
BH CV ECHO MEAS - FS: 22.9 %
BH CV ECHO MEAS - IVS/LVPW: 1.11 CM
BH CV ECHO MEAS - IVSD: 1 CM
BH CV ECHO MEAS - LA DIMENSION: 3.7 CM
BH CV ECHO MEAS - LAT PEAK E' VEL: 10.4 CM/SEC
BH CV ECHO MEAS - LV DIASTOLIC VOL/BSA (35-75): 58 CM2
BH CV ECHO MEAS - LV MASS(C)D: 158.8 GRAMS
BH CV ECHO MEAS - LV MAX PG: 1.87 MMHG
BH CV ECHO MEAS - LV MEAN PG: 1 MMHG
BH CV ECHO MEAS - LV SYSTOLIC VOL/BSA (12-30): 23.8 CM2
BH CV ECHO MEAS - LV V1 MAX: 68.4 CM/SEC
BH CV ECHO MEAS - LV V1 VTI: 14.2 CM
BH CV ECHO MEAS - LVIDD: 4.8 CM
BH CV ECHO MEAS - LVIDS: 3.7 CM
BH CV ECHO MEAS - LVOT AREA: 3.8 CM2
BH CV ECHO MEAS - LVOT DIAM: 2.2 CM
BH CV ECHO MEAS - LVPWD: 0.9 CM
BH CV ECHO MEAS - MED PEAK E' VEL: 7 CM/SEC
BH CV ECHO MEAS - MV A MAX VEL: 71.8 CM/SEC
BH CV ECHO MEAS - MV DEC TIME: 0.24 SEC
BH CV ECHO MEAS - MV E MAX VEL: 77.6 CM/SEC
BH CV ECHO MEAS - MV E/A: 1.08
BH CV ECHO MEAS - SV(LVOT): 54 ML
BH CV ECHO MEAS - SV(MOD-SP2): 62.5 ML
BH CV ECHO MEAS - SV(MOD-SP4): 71.4 ML
BH CV ECHO MEAS - SVI(LVOT): 25.9 ML/M2
BH CV ECHO MEAS - SVI(MOD-SP2): 29.9 ML/M2
BH CV ECHO MEAS - SVI(MOD-SP4): 34.2 ML/M2
BH CV ECHO MEAS - TAPSE (>1.6): 1.88 CM
BH CV ECHO MEASUREMENTS AVERAGE E/E' RATIO: 8.92
BH CV XLRA - TDI S': 10.4 CM/SEC
LEFT ATRIUM VOLUME INDEX: 24.3 ML/M2
LEFT ATRIUM VOLUME: 50.7 ML

## 2024-08-13 ENCOUNTER — TELEPHONE (OUTPATIENT)
Dept: GASTROENTEROLOGY | Age: 49
End: 2024-08-13

## 2024-08-13 NOTE — TELEPHONE ENCOUNTER
Called patient to remind them of their procedure with Dr. Evans Rubi  at [facility]  on 8/6/  to arrive at 815     CONFIRMED  PATIENT HAS INSTR & PRESCRIPTION

## 2024-08-16 ENCOUNTER — ANESTHESIA (OUTPATIENT)
Dept: ENDOSCOPY | Age: 49
End: 2024-08-16
Payer: COMMERCIAL

## 2024-08-16 ENCOUNTER — ANESTHESIA EVENT (OUTPATIENT)
Dept: ENDOSCOPY | Age: 49
End: 2024-08-16
Payer: COMMERCIAL

## 2024-08-16 ENCOUNTER — HOSPITAL ENCOUNTER (OUTPATIENT)
Age: 49
Setting detail: OUTPATIENT SURGERY
Discharge: HOME | End: 2024-08-16
Attending: INTERNAL MEDICINE | Admitting: INTERNAL MEDICINE
Payer: COMMERCIAL

## 2024-08-16 VITALS
BODY MASS INDEX: 31.4 KG/M2 | DIASTOLIC BLOOD PRESSURE: 81 MMHG | HEIGHT: 69 IN | TEMPERATURE: 98.2 F | RESPIRATION RATE: 18 BRPM | OXYGEN SATURATION: 97 % | HEART RATE: 75 BPM | SYSTOLIC BLOOD PRESSURE: 117 MMHG | WEIGHT: 212 LBS

## 2024-08-16 LAB
GLUCOSE BLD-MCNC: 156 MG/DL (ref 70–99)
PERFORMED ON: ABNORMAL

## 2024-08-16 PROCEDURE — 2709999900 HC NON-CHARGEABLE SUPPLY: Performed by: INTERNAL MEDICINE

## 2024-08-16 PROCEDURE — 2580000003 HC RX 258: Performed by: INTERNAL MEDICINE

## 2024-08-16 PROCEDURE — 6360000002 HC RX W HCPCS: Performed by: NURSE ANESTHETIST, CERTIFIED REGISTERED

## 2024-08-16 PROCEDURE — 2500000003 HC RX 250 WO HCPCS: Performed by: NURSE ANESTHETIST, CERTIFIED REGISTERED

## 2024-08-16 PROCEDURE — 45378 DIAGNOSTIC COLONOSCOPY: CPT | Performed by: INTERNAL MEDICINE

## 2024-08-16 PROCEDURE — 3700000000 HC ANESTHESIA ATTENDED CARE: Performed by: INTERNAL MEDICINE

## 2024-08-16 PROCEDURE — 3700000001 HC ADD 15 MINUTES (ANESTHESIA): Performed by: INTERNAL MEDICINE

## 2024-08-16 PROCEDURE — 7100000010 HC PHASE II RECOVERY - FIRST 15 MIN: Performed by: INTERNAL MEDICINE

## 2024-08-16 PROCEDURE — 3609027000 HC COLONOSCOPY: Performed by: INTERNAL MEDICINE

## 2024-08-16 PROCEDURE — 82962 GLUCOSE BLOOD TEST: CPT

## 2024-08-16 PROCEDURE — 7100000011 HC PHASE II RECOVERY - ADDTL 15 MIN: Performed by: INTERNAL MEDICINE

## 2024-08-16 RX ORDER — ATORVASTATIN CALCIUM 20 MG/1
20 TABLET, FILM COATED ORAL DAILY
COMMUNITY

## 2024-08-16 RX ORDER — LIDOCAINE HYDROCHLORIDE 10 MG/ML
INJECTION, SOLUTION EPIDURAL; INFILTRATION; INTRACAUDAL; PERINEURAL PRN
Status: DISCONTINUED | OUTPATIENT
Start: 2024-08-16 | End: 2024-08-16 | Stop reason: SDUPTHER

## 2024-08-16 RX ORDER — OMEPRAZOLE 20 MG/1
20 CAPSULE, DELAYED RELEASE ORAL DAILY
COMMUNITY

## 2024-08-16 RX ORDER — METOPROLOL SUCCINATE 25 MG/1
25 TABLET, EXTENDED RELEASE ORAL DAILY
COMMUNITY

## 2024-08-16 RX ORDER — SPIRONOLACTONE 25 MG/1
25 TABLET ORAL DAILY
COMMUNITY

## 2024-08-16 RX ORDER — VALSARTAN 80 MG/1
80 TABLET ORAL DAILY
COMMUNITY

## 2024-08-16 RX ORDER — PROPOFOL 10 MG/ML
INJECTION, EMULSION INTRAVENOUS PRN
Status: DISCONTINUED | OUTPATIENT
Start: 2024-08-16 | End: 2024-08-16 | Stop reason: SDUPTHER

## 2024-08-16 RX ORDER — SODIUM CHLORIDE, SODIUM LACTATE, POTASSIUM CHLORIDE, CALCIUM CHLORIDE 600; 310; 30; 20 MG/100ML; MG/100ML; MG/100ML; MG/100ML
INJECTION, SOLUTION INTRAVENOUS CONTINUOUS
Status: DISCONTINUED | OUTPATIENT
Start: 2024-08-16 | End: 2024-08-16 | Stop reason: HOSPADM

## 2024-08-16 RX ADMIN — PROPOFOL 100 MG: 10 INJECTION, EMULSION INTRAVENOUS at 09:36

## 2024-08-16 RX ADMIN — LIDOCAINE HYDROCHLORIDE 50 MG: 10 INJECTION, SOLUTION EPIDURAL; INFILTRATION; INTRACAUDAL; PERINEURAL at 09:36

## 2024-08-16 RX ADMIN — SODIUM CHLORIDE, POTASSIUM CHLORIDE, SODIUM LACTATE AND CALCIUM CHLORIDE: 600; 310; 30; 20 INJECTION, SOLUTION INTRAVENOUS at 08:37

## 2024-08-16 RX ADMIN — PROPOFOL 220 MG: 10 INJECTION, EMULSION INTRAVENOUS at 09:38

## 2024-08-16 ASSESSMENT — PAIN - FUNCTIONAL ASSESSMENT
PAIN_FUNCTIONAL_ASSESSMENT: NONE - DENIES PAIN

## 2024-08-16 NOTE — DISCHARGE INSTRUCTIONS
Recommendations:  1. Repeat colonoscopy - 10yrs for screening    Colonoscopy: What to Expect at Home  Your Recovery    After the test, you may be bloated or have gas pains. You may need to pass gas. If a biopsy was done or a polyp was removed, you may have streaks of blood in your stool (feces) for a few days. Problems such as heavy rectal bleeding may not occur until several weeks after the test. This isn't common. But it can happen after polyps are removed.  This care sheet gives you a general idea about how long it will take for you to recover. But each person recovers at a different pace. Follow the steps below to get better as quickly as possible.    How can you care for yourself at home?  Activity    Rest when you feel tired.     You can do your normal activities when it feels okay to do so.   Diet    You may resume your regular diet.     Drink plenty of fluids. This helps to replace the fluids that were lost during the colon prep.     Do not drink alcohol.   Medicines    Your doctor will tell you if and when you can restart your medicines. You will also be given instructions about taking any new medicines.     If you stopped taking aspirin or some other blood thinner, your doctor will tell you when to start taking it again.     If polyps were removed or a biopsy was done during the test, your doctor may tell you not to take aspirin or other anti-inflammatory medicines for a few days. These include ibuprofen (Advil, Motrin) and naproxen (Aleve).   Other instructions    For your safety, do not drive or operate machinery for 24 hours.     Do not sign legal documents or make major decisions until the medicine wears off and you can think clearly. The anesthesia can make it hard for you to fully understand what you are agreeing to, and cause impaired judgement.     When should you call for help?   Call 911 anytime you think you may need emergency care. For example, call if:    You passed out (lost

## 2024-08-16 NOTE — ANESTHESIA PRE PROCEDURE
Department of Anesthesiology  Preprocedure Note       Name:  Jack Lei   Age:  49 y.o.  :  1975                                          MRN:  [id number]         Date:  2024      Surgeon: Surgeon(s):  Evans Rubi MD    Procedure: Procedure(s):  COLORECTAL CANCER SCREENING, NOT HIGH RISK    Medications prior to admission:   Prior to Admission medications    Medication Sig Start Date End Date Taking? Authorizing Provider   metFORMIN (GLUCOPHAGE) 500 MG tablet Take 1 tablet by mouth at bedtime   Yes Mayra Comer MD   atorvastatin (LIPITOR) 20 MG tablet Take 1 tablet by mouth daily   Yes ProviderMayra MD   insulin lispro protamine & lispro (HUMALOG MIX) (75-25) 100 UNIT per ML SUSP injection vial Inject 11 Units into the skin 3 times daily (with meals) SSI   Yes Mayra Comer MD   omeprazole (PRILOSEC) 20 MG delayed release capsule Take 1 capsule by mouth daily   Yes Mayra Comer MD   metoprolol succinate (TOPROL XL) 25 MG extended release tablet Take 1 tablet by mouth daily   Yes ProviderMayra MD   spironolactone (ALDACTONE) 25 MG tablet Take 1 tablet by mouth daily   Yes Provider, MD Mayra   valsartan (DIOVAN) 80 MG tablet Take 1 tablet by mouth daily   Yes ProviderMayra MD   insulin glargine (LANTUS) 100 UNIT/ML injection vial Inject 20 Units into the skin nightly  Patient taking differently: Inject 30 Units into the skin nightly 1/3/22 8/16/24 Yes Nimo Rodas APRN - CNP   apixaban (ELIQUIS) 2.5 MG TABS tablet Take 1 tablet by mouth 2 times daily 1/3/22   Nimo Rodas APRN - CNP       Current medications:    Current Facility-Administered Medications   Medication Dose Route Frequency Provider Last Rate Last Admin    lactated ringers IV soln infusion   IntraVENous Continuous Evans Rubi  mL/hr at 24 0837 New Bag at 24 0837       Allergies:    Allergies   Allergen Reactions    Ativan [Lorazepam] Other (See Comments)

## 2024-08-16 NOTE — ANESTHESIA POSTPROCEDURE EVALUATION
Department of Anesthesiology  Postprocedure Note    Patient: Jack Lei  MRN: [id number]  YOB: 1975  Date of evaluation: 8/16/2024    Procedure Summary       Date: 08/16/24 Room / Location: [place] [address] / [facility]    Anesthesia Start: 0930 Anesthesia Stop: 0954    Procedure: COLORECTAL CANCER SCREENING, NOT HIGH RISK (Abdomen) Diagnosis:       Screen for colon cancer      (Screen for colon cancer [Z12.11])    Surgeons: Evans Rubi MD Responsible Provider:     Anesthesia Type: general, TIVA ASA Status: 2            Anesthesia Type: No value filed.    Bárbara Phase I: Bárbara Score: 10    Bárbara Phase II:      Anesthesia Post Evaluation    Patient location during evaluation: PACU  Patient participation: complete - patient participated  Level of consciousness: sleepy but conscious  Pain score: 0  Airway patency: patent  Nausea & Vomiting: no nausea and no vomiting  Cardiovascular status: blood pressure returned to baseline  Respiratory status: acceptable  Pain management: adequate        No notable events documented.

## 2024-08-16 NOTE — H&P
Patient Name: Jack Lei  : 1975  MRN: [id number]  DATE: 24    Allergies:   Allergies   Allergen Reactions    Ativan [Lorazepam] Other (See Comments)     Increased agitation/restlessness/confusion        ENDOSCOPY  History and Physical    Procedure:    [] Diagnostic Colonoscopy       [x] Screening Colonoscopy  [] EGD      [] ERCP      [] EUS       [] Other    [x] Previous office notes/History and Physical reviewed from the patients chart. Please see EMR for further details of HPI. I have examined the patient's status immediately prior to the procedure and:      Indications/HPI:       [x] Screening              [] History of Polyps      []Fhx of colon CA/polyps []+Cologard/DNA/Stool Testing      Anesthesia:   [x] MAC [] Moderate Sedation   [] General   [] None     ROS: 12 pt review of systems was negative unless stated above    Medications:   Prior to Admission medications    Medication Sig Start Date End Date Taking? Authorizing Provider   metFORMIN (GLUCOPHAGE) 500 MG tablet Take 1 tablet by mouth at bedtime   Yes ProviderMayra MD   atorvastatin (LIPITOR) 20 MG tablet Take 1 tablet by mouth daily   Yes Provider, MD Mayra   insulin lispro protamine & lispro (HUMALOG MIX) (75-25) 100 UNIT per ML SUSP injection vial Inject 11 Units into the skin 3 times daily (with meals) SSI   Yes Mayra Comer MD   omeprazole (PRILOSEC) 20 MG delayed release capsule Take 1 capsule by mouth daily   Yes Mayra Comer MD   metoprolol succinate (TOPROL XL) 25 MG extended release tablet Take 1 tablet by mouth daily   Yes Mayra Comer MD   spironolactone (ALDACTONE) 25 MG tablet Take 1 tablet by mouth daily   Yes ProviderMayra MD   valsartan (DIOVAN) 80 MG tablet Take 1 tablet by mouth daily   Yes ProviderMayra MD   insulin glargine (LANTUS) 100 UNIT/ML injection vial Inject 20 Units into the skin nightly  Patient taking differently: Inject 30 Units into the skin

## 2024-08-16 NOTE — OP NOTE
Patient: Jack Lei : 1975  Med Rec#: [id number] Acc#: [id number]   Primary Care Provider Michelle Catalan PA-C    Date of Procedure:  2024    Endoscopist: Evans Rubi MD    Referring Provider: Michelle Catalan PA-C,     Operation Performed: Colonoscopy     Indications: screening    Anesthesia:  Sedation was administered by anesthesia who monitored the patient during the procedure.    I met with Jack Lei prior to procedure. We discussed the procedure itself, and I have discussed the risks of endoscopy (including-- but not limited to-- pain, discomfort, bleeding potentially requiring second endoscopic procedure and/or blood transfusion, organ perforation requiring operative repair, damage to organs near the colon, infection, aspiration, cardiopulmonary/allergic reaction), benefits, indications to endoscopy. Additionally, we discussed options other than colonoscopy. The patient expressed understanding. All questions answered. The patient decided to proceed with the procedure.  Signed informed consent was placed on the chart.    Blood Loss: minimal    Withdrawal time: > 6 minutes  Bowel Prep: adequate     Complications: no immediate complications    DESCRIPTION OF PROCEDURE:     A time out was performed. After written informed consent was obtained, the patient was placed in the left lateral position.     The perianal area was inspected, and a digital rectal exam was performed. A rectal exam was performed: normal tone, no palpable lesions. At this point, a forward viewing Olympus colonoscope was inserted into the anus and carefully advanced to the cecum.  The cecum was identified by the ileocecal valve and the appendiceal orifice. The colonoscope was then slowly withdrawn with careful inspection of the mucosa in a linear and circumferential fashion. The scope was retroflexed in the rectum. Suction was utilized during the procedure to remove as much air as possible from the bowel. The

## 2024-09-26 ENCOUNTER — OFFICE VISIT (OUTPATIENT)
Dept: CARDIOLOGY | Facility: CLINIC | Age: 49
End: 2024-09-26
Payer: COMMERCIAL

## 2024-09-26 VITALS
DIASTOLIC BLOOD PRESSURE: 66 MMHG | BODY MASS INDEX: 31.84 KG/M2 | OXYGEN SATURATION: 98 % | WEIGHT: 215 LBS | HEART RATE: 78 BPM | SYSTOLIC BLOOD PRESSURE: 115 MMHG | HEIGHT: 69 IN

## 2024-09-26 DIAGNOSIS — E78.00 HYPERCHOLESTEROLEMIA: ICD-10-CM

## 2024-09-26 DIAGNOSIS — I10 ESSENTIAL HYPERTENSION: ICD-10-CM

## 2024-09-26 DIAGNOSIS — I50.42 CHRONIC COMBINED SYSTOLIC AND DIASTOLIC HEART FAILURE: Primary | ICD-10-CM

## 2024-09-26 PROBLEM — E10.65 TYPE 1 DIABETES MELLITUS WITH HYPERGLYCEMIA: Status: ACTIVE | Noted: 2022-02-21

## 2024-09-26 PROBLEM — I42.9 CARDIOMYOPATHY: Status: ACTIVE | Noted: 2022-04-08

## 2024-09-26 RX ORDER — SEMAGLUTIDE 0.68 MG/ML
0.5 INJECTION, SOLUTION SUBCUTANEOUS WEEKLY
COMMUNITY
Start: 2024-06-10

## 2024-09-26 RX ORDER — ATORVASTATIN CALCIUM 20 MG/1
20 TABLET, FILM COATED ORAL DAILY
Qty: 30 TABLET | Refills: 11 | Status: SHIPPED | OUTPATIENT
Start: 2024-09-26

## 2024-10-29 ENCOUNTER — OFFICE VISIT (OUTPATIENT)
Age: 49
End: 2024-10-29
Payer: COMMERCIAL

## 2024-10-29 VITALS — WEIGHT: 213 LBS | BODY MASS INDEX: 31.55 KG/M2 | HEIGHT: 69 IN

## 2024-10-29 DIAGNOSIS — M66.241 SPONTANEOUS RUPTURE OF EXTENSOR TENDON OF RIGHT HAND: Primary | ICD-10-CM

## 2024-10-29 DIAGNOSIS — Z71.2 PERSON CONSULTING FOR EXPLANATION OF EXAMINATION OR TEST FINDING: ICD-10-CM

## 2024-10-29 PROCEDURE — 99213 OFFICE O/P EST LOW 20 MIN: CPT | Performed by: PHYSICIAN ASSISTANT

## 2024-10-29 RX ORDER — SEMAGLUTIDE 0.68 MG/ML
INJECTION, SOLUTION SUBCUTANEOUS
COMMUNITY
Start: 2024-06-10

## 2024-10-29 ASSESSMENT — ENCOUNTER SYMPTOMS: SHORTNESS OF BREATH: 0
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
## 2024-11-01 ENCOUNTER — TELEPHONE (OUTPATIENT)
Age: 49
End: 2024-11-01

## 2024-11-01 ENCOUNTER — PREP FOR PROCEDURE (OUTPATIENT)
Age: 49
End: 2024-11-01

## 2024-11-01 DIAGNOSIS — Z71.2 PERSON CONSULTING FOR EXPLANATION OF EXAMINATION OR TEST FINDING: Primary | ICD-10-CM

## 2024-11-01 DIAGNOSIS — M66.241 SPONTANEOUS RUPTURE OF EXTENSOR TENDON OF RIGHT HAND: ICD-10-CM

## 2024-11-01 PROBLEM — S62.631D: Status: ACTIVE | Noted: 2024-11-01

## 2024-11-01 NOTE — TELEPHONE ENCOUNTER
Tried to contact pt regarding his surgery with Dr. Quinn on 11/07. Due to his insurance he will need to change the facility. The patient didn't answer. I left a voice message with our direct extension so he can give us a call.

## 2024-11-04 ENCOUNTER — TELEPHONE (OUTPATIENT)
Age: 49
End: 2024-11-04

## 2024-11-04 NOTE — TELEPHONE ENCOUNTER
Saloni pre admission/[facility] PT took ozempic so PT surgery needs to be rescued     [phone]

## 2024-11-05 NOTE — TELEPHONE ENCOUNTER
Called and spoke with pt this morning regardign his surgery with Dr. Quinn on 11/6. Due to the pt taking ozempic he will need to reschedule for next week at [facility]. All questions were answered at this time.

## 2024-11-11 ENCOUNTER — PRE-ADMISSION TESTING (OUTPATIENT)
Dept: PREADMISSION TESTING | Facility: HOSPITAL | Age: 49
End: 2024-11-11
Payer: COMMERCIAL

## 2024-11-11 VITALS
WEIGHT: 215.39 LBS | HEIGHT: 70 IN | DIASTOLIC BLOOD PRESSURE: 82 MMHG | SYSTOLIC BLOOD PRESSURE: 111 MMHG | RESPIRATION RATE: 16 BRPM | BODY MASS INDEX: 30.84 KG/M2 | OXYGEN SATURATION: 98 % | HEART RATE: 70 BPM

## 2024-11-11 LAB
ANION GAP SERPL CALCULATED.3IONS-SCNC: 11 MMOL/L (ref 5–15)
BUN SERPL-MCNC: 13 MG/DL (ref 6–20)
BUN/CREAT SERPL: 18.3 (ref 7–25)
CALCIUM SPEC-SCNC: 9 MG/DL (ref 8.6–10.5)
CHLORIDE SERPL-SCNC: 101 MMOL/L (ref 98–107)
CO2 SERPL-SCNC: 24 MMOL/L (ref 22–29)
CREAT SERPL-MCNC: 0.71 MG/DL (ref 0.76–1.27)
DEPRECATED RDW RBC AUTO: 46.5 FL (ref 37–54)
EGFRCR SERPLBLD CKD-EPI 2021: 113.2 ML/MIN/1.73
ERYTHROCYTE [DISTWIDTH] IN BLOOD BY AUTOMATED COUNT: 13.2 % (ref 12.3–15.4)
GLUCOSE SERPL-MCNC: 136 MG/DL (ref 65–99)
HCT VFR BLD AUTO: 44.5 % (ref 37.5–51)
HGB BLD-MCNC: 14.6 G/DL (ref 13–17.7)
MCH RBC QN AUTO: 31.3 PG (ref 26.6–33)
MCHC RBC AUTO-ENTMCNC: 32.8 G/DL (ref 31.5–35.7)
MCV RBC AUTO: 95.3 FL (ref 79–97)
PLATELET # BLD AUTO: 196 10*3/MM3 (ref 140–450)
PMV BLD AUTO: 11.3 FL (ref 6–12)
POTASSIUM SERPL-SCNC: 4.1 MMOL/L (ref 3.5–5.2)
RBC # BLD AUTO: 4.67 10*6/MM3 (ref 4.14–5.8)
SODIUM SERPL-SCNC: 136 MMOL/L (ref 136–145)
WBC NRBC COR # BLD AUTO: 9.6 10*3/MM3 (ref 3.4–10.8)

## 2024-11-11 PROCEDURE — 85027 COMPLETE CBC AUTOMATED: CPT

## 2024-11-11 PROCEDURE — 80048 BASIC METABOLIC PNL TOTAL CA: CPT

## 2024-11-11 PROCEDURE — 36415 COLL VENOUS BLD VENIPUNCTURE: CPT

## 2024-11-11 RX ORDER — METFORMIN HYDROCHLORIDE EXTENDED-RELEASE TABLETS 500 MG/1
500 TABLET, FILM COATED, EXTENDED RELEASE ORAL NIGHTLY
COMMUNITY

## 2024-11-11 NOTE — DISCHARGE INSTRUCTIONS
Preparing for Surgery  Follow these instructions before the procedure:  Several days or weeks before your procedure  Medication(s) you need to stop   1 week prior to surgery: Ozempic, please do not resume until after surgery      Ask your health care provider about:  Changing or stopping your regular medicines. This is especially important if you are taking diabetes medicines or blood thinners.  Taking medicines such as aspirin and ibuprofen. These medicines can thin your blood. Do not take these medicines unless your health care provider tells you to take them.  Taking over-the-counter medicines, vitamins, herbs, and supplements.    Contact your surgeon if you:  Develop a fever of more than 100.4°F (38°C) or other feelings of illness during the 48 hours before your surgery.  Have symptoms that get worse.  Have questions or concerns about your surgery.  If you are going home the same day of your surgery you will need to arrange for a responsible adult, age 18 years old or older, to drive you home from the hospital and stay with you for 24 hours. Verification of the  will be made prior to any procedure requiring sedation. You may not go home in a taxi or any form of public transportation by yourself.     Day before your procedure  Medication(s) you need to stop the day before your surgery: Please hold Valsartan for 24 Hours prior to surgery    24 hours before your procedure DO NOT drink alcoholic beverages or smoke.  24 hours before your procedure STOP taking Erectile Dysfunction medication (i.e.,Cialis, Viagra)   You may be asked to shower with a germ-killing soap.  Day of your procedure   You may take the following medication(s) the morning of surgery with a sip of water:  Please take Metoprolol and Priolosec Day of Surgery with water.      8 hours before your scheduled arrival time, STOP all food, any dairy products, and full liquids. This includes hard candy, chewing gum or mints. This is extremely  important to prevent serious complications.     Up to 2 hours before your scheduled arrival time, you may have clear liquids no cream, powder, or pulp of any kind. Safe options are water, black coffee, plain tea, soda, Gatorade/Powerade, clear broth, apple juice.    2 hours before your scheduled arrival time, STOP drinking clear liquids.    You may need to take another shower with a germ-killing soap before you leave home in the morning. Do not use perfumes, colognes, or body lotions.  Wear comfortable loose-fitting clothing.  Remove all jewelry including body piercing and rings, dark colored nail polish, and make up prior to arrival at the hospital. Leave all valuables at home.   Bring your hearing aids if you rely on them.  Do not wear contact lenses. If you wear eyeglasses remember to bring a case to store them in while you are in surgery.  Do not use denture adhesives since you will be asked to remove them during your surgery.    You do not need to bring your home medications into the hospital.   Bring your sleep apnea device with you on the day of your surgery (if this applies to you).  If you have an Inspire implant for sleep apnea, please bring the remote with you on the day of surgery.  If you wear portable oxygen, bring it with you.   If you are staying overnight, you may bring a bag of items you may need such as slippers, robe and a change of clothes for your discharge. You may want to leave these items in the car until you are ready for them since your family will take your belongings when you leave the pre-operative area.  Arrive at the hospital as scheduled by the office. You will be asked to arrive 2 hours prior to your surgery time in order to prepare for your procedure.  When you arrive at the hospital  Go to the registration desk located at the main entrance of the hospital.  After registration is completed, you will be given a beeper and a sticker sheet. Take the stickers to Outpatient Surgery and  place in the tray at the end of the desk to notify the staff that you have arrived and registered.   Return to the lobby to wait. You are not always called back according to the time of arrival but rather the time your doctor will be ready.  When your beeper lights up and vibrates proceed through the double doors, under the stairs, and a member of the Outpatient Surgery staff will escort you to your preoperative room.   How to Use Chlorhexidine Before Surgery  Chlorhexidine gluconate (CHG) is a germ-killing (antiseptic) solution that is used to clean the skin. It can get rid of the bacteria that normally live on the skin and can keep them away for about 24 hours. To clean your skin with CHG, you may be given:  A CHG solution to use in the shower or as part of a sponge bath.  A prepackaged cloth that contains CHG.  Cleaning your skin with CHG may help lower the risk for infection:  While you are staying in the intensive care unit of the hospital.  If you have a vascular access, such as a central line, to provide short-term or long-term access to your veins.  If you have a catheter to drain urine from your bladder.  If you are on a ventilator. A ventilator is a machine that helps you breathe by moving air in and out of your lungs.  After surgery.  What are the risks?  Risks of using CHG include:  A skin reaction.  Hearing loss, if CHG gets in your ears and you have a perforated eardrum.  Eye injury, if CHG gets in your eyes and is not rinsed out.  The CHG product catching fire.  Make sure that you avoid smoking and flames after applying CHG to your skin.  Do not use CHG:  If you have a chlorhexidine allergy or have previously reacted to chlorhexidine.  On babies younger than 2 months of age.  How to use CHG solution  Use CHG only as told by your health care provider, and follow the instructions on the label.  Use the full amount of CHG as directed. Usually, this is one bottle.  During a shower    Follow these steps  when using CHG solution during a shower (unless your health care provider gives you different instructions):  Start the shower.  Use your normal soap and shampoo to wash your face and hair.  Turn off the shower or move out of the shower stream.  Pour the CHG onto a clean washcloth. Do not use any type of brush or rough-edged sponge.  Starting at your neck, lather your body down to your toes. Make sure you follow these instructions:  If you will be having surgery, pay special attention to the part of your body where you will be having surgery. Scrub this area for at least 1 minute.  Do not use CHG on your head or face. If the solution gets into your ears or eyes, rinse them well with water.  Avoid your genital area.  Avoid any areas of skin that have broken skin, cuts, or scrapes.  Scrub your back and under your arms. Make sure to wash skin folds.  Let the lather sit on your skin for 1-2 minutes or as long as told by your health care provider.  Thoroughly rinse your entire body in the shower. Make sure that all body creases and crevices are rinsed well.  Dry off with a clean towel. Do not put any substances on your body afterward--such as powder, lotion, or perfume--unless you are told to do so by your health care provider. Only use lotions that are recommended by the .  Put on clean clothes or pajamas.  If it is the night before your surgery, sleep in clean sheets.     During a sponge bath  Follow these steps when using CHG solution during a sponge bath (unless your health care provider gives you different instructions):  Use your normal soap and shampoo to wash your face and hair.  Pour the CHG onto a clean washcloth.  Starting at your neck, lather your body down to your toes. Make sure you follow these instructions:  If you will be having surgery, pay special attention to the part of your body where you will be having surgery. Scrub this area for at least 1 minute.  Do not use CHG on your head or face.  If the solution gets into your ears or eyes, rinse them well with water.  Avoid your genital area.  Avoid any areas of skin that have broken skin, cuts, or scrapes.  Scrub your back and under your arms. Make sure to wash skin folds.  Let the lather sit on your skin for 1-2 minutes or as long as told by your health care provider.  Using a different clean, wet washcloth, thoroughly rinse your entire body. Make sure that all body creases and crevices are rinsed well.  Dry off with a clean towel. Do not put any substances on your body afterward--such as powder, lotion, or perfume--unless you are told to do so by your health care provider. Only use lotions that are recommended by the .  Put on clean clothes or pajamas.  If it is the night before your surgery, sleep in clean sheets.  How to use CHG prepackaged cloths  Only use CHG cloths as told by your health care provider, and follow the instructions on the label.  Use the CHG cloth on clean, dry skin.  Do not use the CHG cloth on your head or face unless your health care provider tells you to.  When washing with the CHG cloth:  Avoid your genital area.  Avoid any areas of skin that have broken skin, cuts, or scrapes.  Before surgery    Follow these steps when using a CHG cloth to clean before surgery (unless your health care provider gives you different instructions):  Using the CHG cloth, vigorously scrub the part of your body where you will be having surgery. Scrub using a back-and-forth motion for 3 minutes. The area on your body should be completely wet with CHG when you are done scrubbing.  Do not rinse. Discard the cloth and let the area air-dry. Do not put any substances on the area afterward, such as powder, lotion, or perfume.  Put on clean clothes or pajamas.  If it is the night before your surgery, sleep in clean sheets.     For general bathing  Follow these steps when using CHG cloths for general bathing (unless your health care provider gives  you different instructions).  Use a separate CHG cloth for each area of your body. Make sure you wash between any folds of skin and between your fingers and toes. Wash your body in the following order, switching to a new cloth after each step:  The front of your neck, shoulders, and chest.  Both of your arms, under your arms, and your hands.  Your stomach and groin area, avoiding the genitals.  Your right leg and foot.  Your left leg and foot.  The back of your neck, your back, and your buttocks.  Do not rinse. Discard the cloth and let the area air-dry. Do not put any substances on your body afterward--such as powder, lotion, or perfume--unless you are told to do so by your health care provider. Only use lotions that are recommended by the .  Put on clean clothes or pajamas.  Contact a health care provider if:  Your skin gets irritated after scrubbing.  You have questions about using your solution or cloth.  You swallow any chlorhexidine. Call your local poison control center (1-583.794.2002 in the [place]  Get help right away if:  Your eyes itch badly, or they become very red or swollen.  Your skin itches badly and is red or swollen.  Your hearing changes.  You have trouble seeing.  You have swelling or tingling in your mouth or throat.  You have trouble breathing.  These symptoms may represent a serious problem that is an emergency. Do not wait to see if the symptoms will go away. Get medical help right away. Call your local emergency services (326 in the [place] Do not drive yourself to the hospital.  Summary  Chlorhexidine gluconate (CHG) is a germ-killing (antiseptic) solution that is used to clean the skin. Cleaning your skin with CHG may help to lower your risk for infection.  You may be given CHG to use for bathing. It may be in a bottle or in a prepackaged cloth to use on your skin. Carefully follow your health care provider's instructions and the instructions on the product label.  Do not use  CHG if you have a chlorhexidine allergy.  Contact your health care provider if your skin gets irritated after scrubbing.  This information is not intended to replace advice given to you by your health care provider. Make sure you discuss any questions you have with your health care provider.  Document Revised: 04/17/2023 Document Reviewed: 02/28/2022  Elsevier Patient Education © 2023 [employer]

## 2024-11-13 ENCOUNTER — ANESTHESIA EVENT (OUTPATIENT)
Dept: PERIOP | Facility: HOSPITAL | Age: 49
End: 2024-11-13
Payer: COMMERCIAL

## 2024-11-13 ENCOUNTER — HOSPITAL ENCOUNTER (OUTPATIENT)
Facility: HOSPITAL | Age: 49
Setting detail: HOSPITAL OUTPATIENT SURGERY
Discharge: HOME | End: 2024-11-13
Attending: ORTHOPAEDIC SURGERY | Admitting: ORTHOPAEDIC SURGERY
Payer: COMMERCIAL

## 2024-11-13 ENCOUNTER — ANESTHESIA (OUTPATIENT)
Dept: PERIOP | Facility: HOSPITAL | Age: 49
End: 2024-11-13
Payer: COMMERCIAL

## 2024-11-13 VITALS
SYSTOLIC BLOOD PRESSURE: 117 MMHG | DIASTOLIC BLOOD PRESSURE: 71 MMHG | HEART RATE: 83 BPM | OXYGEN SATURATION: 97 % | TEMPERATURE: 98 F | RESPIRATION RATE: 16 BRPM

## 2024-11-13 PROBLEM — M66.231 NONTRAUMATIC RUPTURE OF EXTENSOR TENDONS OF RIGHT HAND AND WRIST: Status: ACTIVE | Noted: 2024-11-13

## 2024-11-13 PROBLEM — M66.241 NONTRAUMATIC RUPTURE OF EXTENSOR TENDONS OF RIGHT HAND AND WRIST: Status: ACTIVE | Noted: 2024-11-13

## 2024-11-13 LAB
GLUCOSE BLDC GLUCOMTR-MCNC: 119 MG/DL (ref 70–130)
GLUCOSE BLDC GLUCOMTR-MCNC: 145 MG/DL (ref 70–130)

## 2024-11-13 PROCEDURE — 82948 REAGENT STRIP/BLOOD GLUCOSE: CPT

## 2024-11-13 PROCEDURE — 25010000002 FENTANYL CITRATE (PF) 50 MCG/ML SOLUTION: Performed by: NURSE ANESTHETIST, CERTIFIED REGISTERED

## 2024-11-13 PROCEDURE — 25010000002 MIDAZOLAM PER 1MG: Performed by: ANESTHESIOLOGY

## 2024-11-13 PROCEDURE — 25010000002 PROPOFOL 10 MG/ML EMULSION: Performed by: NURSE ANESTHETIST, CERTIFIED REGISTERED

## 2024-11-13 PROCEDURE — 25010000002 LIDOCAINE PF 2% 2 % SOLUTION: Performed by: NURSE ANESTHETIST, CERTIFIED REGISTERED

## 2024-11-13 PROCEDURE — 25010000002 DEXAMETHASONE PER 1 MG: Performed by: NURSE ANESTHETIST, CERTIFIED REGISTERED

## 2024-11-13 PROCEDURE — 25810000003 LACTATED RINGERS PER 1000 ML: Performed by: ORTHOPAEDIC SURGERY

## 2024-11-13 PROCEDURE — 25010000002 ONDANSETRON PER 1 MG: Performed by: NURSE ANESTHETIST, CERTIFIED REGISTERED

## 2024-11-13 PROCEDURE — 25010000002 CEFAZOLIN 3 G RECONSTITUTED SOLUTION 1 EACH VIAL: Performed by: ORTHOPAEDIC SURGERY

## 2024-11-13 PROCEDURE — 25010000002 LIDOCAINE 1% - EPINEPHRINE 1:100000 1 %-1:100000 SOLUTION: Performed by: ORTHOPAEDIC SURGERY

## 2024-11-13 DEVICE — SUT FW LP 3/0 TPR NDL 18IN AR722701: Type: IMPLANTABLE DEVICE | Site: HAND | Status: FUNCTIONAL

## 2024-11-13 RX ORDER — SODIUM CHLORIDE 9 MG/ML
40 INJECTION, SOLUTION INTRAVENOUS AS NEEDED
Status: DISCONTINUED | OUTPATIENT
Start: 2024-11-13 | End: 2024-11-13 | Stop reason: HOSPADM

## 2024-11-13 RX ORDER — ONDANSETRON 2 MG/ML
INJECTION INTRAMUSCULAR; INTRAVENOUS AS NEEDED
Status: DISCONTINUED | OUTPATIENT
Start: 2024-11-13 | End: 2024-11-13 | Stop reason: SURG

## 2024-11-13 RX ORDER — PROPOFOL 10 MG/ML
VIAL (ML) INTRAVENOUS AS NEEDED
Status: DISCONTINUED | OUTPATIENT
Start: 2024-11-13 | End: 2024-11-13 | Stop reason: SURG

## 2024-11-13 RX ORDER — SODIUM CHLORIDE 0.9 % (FLUSH) 0.9 %
3 SYRINGE (ML) INJECTION EVERY 12 HOURS SCHEDULED
Status: DISCONTINUED | OUTPATIENT
Start: 2024-11-13 | End: 2024-11-13 | Stop reason: HOSPADM

## 2024-11-13 RX ORDER — MAGNESIUM HYDROXIDE 1200 MG/15ML
LIQUID ORAL AS NEEDED
Status: DISCONTINUED | OUTPATIENT
Start: 2024-11-13 | End: 2024-11-13 | Stop reason: HOSPADM

## 2024-11-13 RX ORDER — SODIUM CHLORIDE, SODIUM LACTATE, POTASSIUM CHLORIDE, CALCIUM CHLORIDE 600; 310; 30; 20 MG/100ML; MG/100ML; MG/100ML; MG/100ML
100 INJECTION, SOLUTION INTRAVENOUS CONTINUOUS
Status: DISCONTINUED | OUTPATIENT
Start: 2024-11-13 | End: 2024-11-13 | Stop reason: HOSPADM

## 2024-11-13 RX ORDER — MIDAZOLAM HYDROCHLORIDE 2 MG/2ML
2 INJECTION, SOLUTION INTRAMUSCULAR; INTRAVENOUS
Status: DISCONTINUED | OUTPATIENT
Start: 2024-11-13 | End: 2024-11-13 | Stop reason: HOSPADM

## 2024-11-13 RX ORDER — LIDOCAINE HYDROCHLORIDE 10 MG/ML
0.5 INJECTION, SOLUTION EPIDURAL; INFILTRATION; INTRACAUDAL; PERINEURAL ONCE AS NEEDED
Status: DISCONTINUED | OUTPATIENT
Start: 2024-11-13 | End: 2024-11-13 | Stop reason: HOSPADM

## 2024-11-13 RX ORDER — FLUMAZENIL 0.1 MG/ML
0.2 INJECTION INTRAVENOUS AS NEEDED
Status: DISCONTINUED | OUTPATIENT
Start: 2024-11-13 | End: 2024-11-13 | Stop reason: HOSPADM

## 2024-11-13 RX ORDER — HYDROCODONE BITARTRATE AND ACETAMINOPHEN 10; 325 MG/1; MG/1
1 TABLET ORAL EVERY 4 HOURS PRN
Status: DISCONTINUED | OUTPATIENT
Start: 2024-11-13 | End: 2024-11-13 | Stop reason: HOSPADM

## 2024-11-13 RX ORDER — FENTANYL CITRATE 50 UG/ML
INJECTION, SOLUTION INTRAMUSCULAR; INTRAVENOUS AS NEEDED
Status: DISCONTINUED | OUTPATIENT
Start: 2024-11-13 | End: 2024-11-13 | Stop reason: SURG

## 2024-11-13 RX ORDER — LIDOCAINE HYDROCHLORIDE AND EPINEPHRINE 10; 10 MG/ML; UG/ML
INJECTION, SOLUTION INFILTRATION; PERINEURAL AS NEEDED
Status: DISCONTINUED | OUTPATIENT
Start: 2024-11-13 | End: 2024-11-13 | Stop reason: HOSPADM

## 2024-11-13 RX ORDER — SODIUM CHLORIDE 0.9 % (FLUSH) 0.9 %
3-10 SYRINGE (ML) INJECTION AS NEEDED
Status: DISCONTINUED | OUTPATIENT
Start: 2024-11-13 | End: 2024-11-13 | Stop reason: HOSPADM

## 2024-11-13 RX ORDER — DROPERIDOL 2.5 MG/ML
0.62 INJECTION, SOLUTION INTRAMUSCULAR; INTRAVENOUS ONCE AS NEEDED
Status: DISCONTINUED | OUTPATIENT
Start: 2024-11-13 | End: 2024-11-13 | Stop reason: HOSPADM

## 2024-11-13 RX ORDER — DEXAMETHASONE SODIUM PHOSPHATE 4 MG/ML
INJECTION, SOLUTION INTRA-ARTICULAR; INTRALESIONAL; INTRAMUSCULAR; INTRAVENOUS; SOFT TISSUE AS NEEDED
Status: DISCONTINUED | OUTPATIENT
Start: 2024-11-13 | End: 2024-11-13 | Stop reason: SURG

## 2024-11-13 RX ORDER — FENTANYL CITRATE 50 UG/ML
50 INJECTION, SOLUTION INTRAMUSCULAR; INTRAVENOUS
Status: DISCONTINUED | OUTPATIENT
Start: 2024-11-13 | End: 2024-11-13 | Stop reason: HOSPADM

## 2024-11-13 RX ORDER — LABETALOL HYDROCHLORIDE 5 MG/ML
5 INJECTION, SOLUTION INTRAVENOUS
Status: DISCONTINUED | OUTPATIENT
Start: 2024-11-13 | End: 2024-11-13 | Stop reason: HOSPADM

## 2024-11-13 RX ORDER — LIDOCAINE HYDROCHLORIDE 20 MG/ML
INJECTION, SOLUTION EPIDURAL; INFILTRATION; INTRACAUDAL; PERINEURAL AS NEEDED
Status: DISCONTINUED | OUTPATIENT
Start: 2024-11-13 | End: 2024-11-13 | Stop reason: SURG

## 2024-11-13 RX ORDER — HYDROCODONE BITARTRATE AND ACETAMINOPHEN 5; 325 MG/1; MG/1
1 TABLET ORAL EVERY 4 HOURS PRN
Status: DISCONTINUED | OUTPATIENT
Start: 2024-11-13 | End: 2024-11-13 | Stop reason: HOSPADM

## 2024-11-13 RX ORDER — ACETAMINOPHEN 500 MG
1000 TABLET ORAL ONCE
Status: COMPLETED | OUTPATIENT
Start: 2024-11-13 | End: 2024-11-13

## 2024-11-13 RX ORDER — SODIUM CHLORIDE 0.9 % (FLUSH) 0.9 %
3 SYRINGE (ML) INJECTION AS NEEDED
Status: DISCONTINUED | OUTPATIENT
Start: 2024-11-13 | End: 2024-11-13 | Stop reason: HOSPADM

## 2024-11-13 RX ORDER — ONDANSETRON 2 MG/ML
4 INJECTION INTRAMUSCULAR; INTRAVENOUS ONCE AS NEEDED
Status: DISCONTINUED | OUTPATIENT
Start: 2024-11-13 | End: 2024-11-13 | Stop reason: HOSPADM

## 2024-11-13 RX ORDER — NALOXONE HCL 0.4 MG/ML
0.4 VIAL (ML) INJECTION AS NEEDED
Status: DISCONTINUED | OUTPATIENT
Start: 2024-11-13 | End: 2024-11-13 | Stop reason: HOSPADM

## 2024-11-13 RX ORDER — SODIUM CHLORIDE, SODIUM LACTATE, POTASSIUM CHLORIDE, CALCIUM CHLORIDE 600; 310; 30; 20 MG/100ML; MG/100ML; MG/100ML; MG/100ML
1000 INJECTION, SOLUTION INTRAVENOUS CONTINUOUS
Status: DISCONTINUED | OUTPATIENT
Start: 2024-11-13 | End: 2024-11-13 | Stop reason: HOSPADM

## 2024-11-13 RX ADMIN — ONDANSETRON 4 MG: 2 INJECTION INTRAMUSCULAR; INTRAVENOUS at 13:50

## 2024-11-13 RX ADMIN — PROPOFOL 150 MG: 10 INJECTION, EMULSION INTRAVENOUS at 13:30

## 2024-11-13 RX ADMIN — ACETAMINOPHEN 1000 MG: 500 TABLET, FILM COATED ORAL at 12:55

## 2024-11-13 RX ADMIN — MIDAZOLAM HYDROCHLORIDE 2 MG: 1 INJECTION, SOLUTION INTRAMUSCULAR; INTRAVENOUS at 12:56

## 2024-11-13 RX ADMIN — DEXAMETHASONE SODIUM PHOSPHATE 4 MG: 4 INJECTION, SOLUTION INTRA-ARTICULAR; INTRALESIONAL; INTRAMUSCULAR; INTRAVENOUS; SOFT TISSUE at 13:50

## 2024-11-13 RX ADMIN — FENTANYL CITRATE 100 MCG: 50 INJECTION, SOLUTION INTRAMUSCULAR; INTRAVENOUS at 13:30

## 2024-11-13 RX ADMIN — SODIUM CHLORIDE 3000 MG: 900 INJECTION INTRAVENOUS at 13:39

## 2024-11-13 RX ADMIN — LIDOCAINE HYDROCHLORIDE 100 MG: 20 INJECTION, SOLUTION EPIDURAL; INFILTRATION; INTRACAUDAL; PERINEURAL at 13:30

## 2024-11-13 RX ADMIN — SODIUM CHLORIDE, POTASSIUM CHLORIDE, SODIUM LACTATE AND CALCIUM CHLORIDE 1000 ML: 600; 310; 30; 20 INJECTION, SOLUTION INTRAVENOUS at 12:39

## 2024-11-13 NOTE — ANESTHESIA POSTPROCEDURE EVALUATION
Patient: Santos Oviedo    Procedure Summary       Date: 11/13/24 Room / Location:  [facility] OR  /  [facility]    Anesthesia Start: 1329 Anesthesia Stop: 1413    Procedure: RIGHT MIDDLE FINGER EXTENSOR TENDON TRANSFER TO RIGHT MIDDLE EXTENSOR TENDON (Right: Fingers) Diagnosis: (S62.635U)    Surgeons: Benigno Garcia MD Provider: Sebastian Pretty CRNA    Anesthesia Type: general ASA Status: 3            Anesthesia Type: general    Vitals  Vitals Value Taken Time   /71 11/13/24 1437   Temp 98 °F (36.7 °C) 11/13/24 1425   Pulse 89 11/13/24 1437   Resp 16 11/13/24 1437   SpO2 98 % 11/13/24 1437           Post Anesthesia Care and Evaluation    Patient location during evaluation: PACU  Patient participation: complete - patient participated  Level of consciousness: awake and alert  Pain management: adequate    Airway patency: patent  Anesthetic complications: No anesthetic complications    Cardiovascular status: acceptable  Respiratory status: acceptable  Hydration status: acceptable    Comments: Blood pressure 116/69, pulse 84, temperature 98 °F (36.7 °C), resp. rate 16, SpO2 97%.    Pt discharged from PACU based on kentrell score >8

## 2024-11-13 NOTE — OP NOTE
Patient Name: Neptali  : 1975  MRN: [id number]    DATE of SURGERY: 2024    SURGEON: Benigno Garcia MD    ASSISTANT: NONE    PREOPERATIVE DIAGNOSES:   1.  Right hand middle finger zone 6 EDC tendon spontaneous rupture    POSTOPERATIVE DIAGNOSES:   1.  Right hand middle finger zone 6 EDC tendon spontaneous rupture     PROCEDURE PERFORMED:   1.  Right hand zone 6 middle finger EDC tendon side-to-side transfer to ring finger EDC    IMPLANTS  None.      ANESTHESIA    General anesthesia with local anesthetic      OPERATIVE INDICATIONS    This patient is 49 y.o. male who presented to the clinical setting after spontaneous rupture of right middle finger EDC tendon in August- confirmed on MRI.  Treatment options were discussed.  Risks including, but not limited to, bleeding, infection, wound healing problems, failure to alleviate any or all of preoperative symptoms, symptomatic recurrence, need for additional procedures, ongoing thumb dysfunction, stiffness, weakness and adhesions were discussed. All questions were answered preoperatively. Written and informed consent were obtained.      ESTIMATED BLOOD LOSS    Minimal      SPECIMENS  None.      DRAINS  None.      COMPLICATIONS    None.      PROCEDURE IN DETAIL  Patient was met in the preoperative holding area where the correct patient, procedure and side were confirmed.  The operative site was marked by myself with the patient's agreement.  The consent was signed by myself.  Patient was then transported to the operating room and placed supine on the operating room table.  Patient was secured to the table and all bony prominences were padded.  A formal timeout was held in which myself, the operating team and patient, again, identified the correct patient, procedure and side.  General anesthesia was induced.  A nonsterile tourniquet was placed about the operative brachium.  The operative extremity was then prepped and draped in a normal sterile fashion.   Preoperative antibiotics were administered within 1 hour of incision.    Skin marker was used to delineate a dorsal longitudinal incision centered overlying the right wrist in line with the third metacarpal.  Right upper extremity was exsanguinated and Esmarch and the Bernick is inflated to 250 mmHg for less than 30 minutes.  15 blade scalpel was used to incise only through the skin.  Full-thickness skin flaps were elevated down to the level of the extensor retinaculum.  Longitudinal veins were preserved and crossing veins were cauterized.  Cutaneous nerves were identified and protected.  Extensor retinaculum was split in line with the incision and the fourth extensor compartment was entered.  A chronic appearing rupture of the middle finger EDC tendon was encountered in zone 6.  Dissection was carried proximally and the distal aspect of the extensor retinaculum was released.  I was able to identify the proximal portion of the EDC tendon but this had very little excursion.  I did not feel that a primary repair was capable.  Therefore, an end to side transfer was performed to the ring finger EDC tendon.  The distal stump of the middle finger EDC tendon was split and transferred to the ring finger EDC tendon.  It was secured with FiberWire suture.  During transfer, the ring finger MCP joint was held in neutral extension and middle finger MCP joint held in slight hyperextension.  Improved tenodesis exam was confirmed.  Wound was irrigated with normal saline.  Skin edges reapproximated with nylon suture.  For postoperative pain control, 10 cc of 1% lidocaine with epinephrine were injected.  A well-padded volar resting splint extending from the volar distal forearm to PIP joints of digits 2-5 was applied.  General anesthesia was reversed, tourniquet was deflated and he was taken to PACU in stable condition.  All counts at the end of the procedure were correct.  He tolerated procedure well and was without intraoperative  complication.       POSTOPERATIVE PLAN  1.  Discharge home, return to clinic in 1 week for wound check   2.  Keep splint in place until follow-up  3.  Nonweightbearing to operative extremity  4.  Call clinic number in the interim with questions or concerns

## 2024-11-13 NOTE — ANESTHESIA PREPROCEDURE EVALUATION
Anesthesia Evaluation     Patient summary reviewed   no history of anesthetic complications:   NPO Solid Status: > 8 hours  NPO Liquid Status: > 4 hours           Airway   Mallampati: II  TM distance: >3 FB  No difficulty expected  Dental - normal exam     Pulmonary    (-) asthma, sleep apnea, not a smoker  Cardiovascular   Exercise tolerance: good (4-7 METS)    Patient on routine beta blocker    (+) hypertension, CHF , hyperlipidemia  (-) pacemaker, cardiac stents, CABG    ROS comment: Cardiomyopathy    Echo:  ·  Left ventricular systolic function is low normal. Left ventricular ejection fraction appears to be 51 - 55%.  There is very subtle global hypokinesis.  ·  Estimated right ventricular systolic pressure from tricuspid regurgitation is normal (<35 mmHg).  ·  Normal size and function the right ventricle.  ·  No significant valvular pathology.  ·  No previous studies performed here, therefore no direct comparisons can be made.         Neuro/Psych  (-) seizures, TIA, CVA  GI/Hepatic/Renal/Endo    (+) diabetes mellitus (right upper leg) using insulin  (-) liver disease, no renal disease    Musculoskeletal     Abdominal    Substance History      OB/GYN          Other   arthritis,                   Anesthesia Plan    ASA 3     general     intravenous induction     Anesthetic plan, risks, benefits, and alternatives have been provided, discussed and informed consent has been obtained with: patient.      CODE STATUS:

## 2024-11-13 NOTE — ANESTHESIA PROCEDURE NOTES
Airway  Urgency: elective    Date/Time: 11/13/2024 1:33 PM  Airway not difficult    General Information and Staff    Patient location during procedure: OR    Indications and Patient Condition  Indications for airway management: airway protection    Preoxygenated: yes  MILS maintained throughout  Mask difficulty assessment: 1 - vent by mask    Final Airway Details  Final airway type: supraglottic airway      Successful airway: classic  Size 4     Number of attempts at approach: 1  Assessment: lips, teeth, and gum same as pre-op and atraumatic intubation

## 2024-11-21 ENCOUNTER — OFFICE VISIT (OUTPATIENT)
Age: 49
End: 2024-11-21

## 2024-11-21 VITALS — WEIGHT: 213 LBS | BODY MASS INDEX: 31.55 KG/M2 | HEIGHT: 69 IN

## 2024-11-21 DIAGNOSIS — Z47.89 SURGICAL AFTERCARE, MUSCULOSKELETAL SYSTEM: Primary | ICD-10-CM

## 2024-11-21 DIAGNOSIS — M66.241 SPONTANEOUS RUPTURE OF EXTENSOR TENDON OF RIGHT HAND: ICD-10-CM

## 2024-11-21 PROCEDURE — 99024 POSTOP FOLLOW-UP VISIT: CPT | Performed by: PHYSICIAN ASSISTANT

## 2024-11-21 ASSESSMENT — ENCOUNTER SYMPTOMS: SHORTNESS OF BREATH: 0

## 2024-11-21 NOTE — PROGRESS NOTES
surgical incision on the dorsum of the hand.  Sutures still intact.  No signs of dehiscence or infection.  Patient neurovascular intact distally.   Skin:     Findings: No bruising or erythema.   Neurological:      Mental Status: He is alert and oriented to person, place, and time.   Psychiatric:         Mood and Affect: Mood normal.         Thought Content: Thought content normal.       Suture removal:  Patient presents for suture removal. The wound is well healed without signs of infection.  The sutures are removed. Wound care and activity instructions given.       Radiology:  No images completed at this visit.    Ht 1.753 m (5' 9\")   Wt 96.6 kg (213 lb)   BMI 31.45 kg/m²   Body mass index is 31.45 kg/m².         Assessment:  1. Surgical aftercare, musculoskeletal system    2. Spontaneous rupture of extensor tendon of right hand          Plan:     Patient doing well.  He is to follow-up with our occupational therapist for custom splint today.  Patient to return in 2 weeks for follow-up with Dr. Quinn's clinic.  Patient voices understanding agrees to care plan.    No orders of the defined types were placed in this encounter.    No orders of the defined types were placed in this encounter.    Return in about 2 weeks (around 12/5/2024) for post op check right wrist Quinn patient.    Plan of care reviewed with patient and questions answered.  Patient states understanding.           An electronic signature was used to authenticate this note.    --LOURDES Clay at 11/21/24 at 10:07 AM

## 2024-12-13 ENCOUNTER — OFFICE VISIT (OUTPATIENT)
Age: 49
End: 2024-12-13

## 2024-12-13 DIAGNOSIS — M66.241 SPONTANEOUS RUPTURE OF EXTENSOR TENDON OF RIGHT HAND: Primary | ICD-10-CM

## 2024-12-13 PROCEDURE — 99024 POSTOP FOLLOW-UP VISIT: CPT | Performed by: NURSE PRACTITIONER

## 2024-12-13 NOTE — PROGRESS NOTES
Healthcare System    Abuse Screen     Feels Unsafe at Home or Work/School: no     Feels Threatened by Someone: no     Does Anyone Try to Keep You From Having Contact with Others or Doing Things Outside Your Home?: no     Physical Signs of Abuse Present: no   Housing Stability: Unknown (11/11/2024)    Received from [facility]    Housing Stability     Current Living Arrangements: home      Social History     Occupational History    Not on file   Tobacco Use    Smoking status: Never    Smokeless tobacco: Never   Vaping Use    Vaping status: Never Used   Substance and Sexual Activity    Alcohol use: Yes    Drug use: Not Currently    Sexual activity: Not on file        Tobacco Use      Smoking status: Never      Smokeless tobacco: Never     No family history on file.     Medications  Current Outpatient Medications   Medication Sig Dispense Refill    OZEMPIC, 0.25 OR 0.5 MG/DOSE, 2 MG/3ML SOPN INJECT 1/2 (ONE-HALF) MG SUBCUTANEOUSLY ONCE A WEEK      metFORMIN (GLUCOPHAGE) 500 MG tablet Take 1 tablet by mouth at bedtime      atorvastatin (LIPITOR) 20 MG tablet Take 1 tablet by mouth daily      insulin lispro protamine & lispro (HUMALOG MIX) (75-25) 100 UNIT per ML SUSP injection vial Inject 11 Units into the skin 3 times daily (with meals) SSI      omeprazole (PRILOSEC) 20 MG delayed release capsule Take 1 capsule by mouth daily      metoprolol succinate (TOPROL XL) 25 MG extended release tablet Take 1 tablet by mouth daily      spironolactone (ALDACTONE) 25 MG tablet Take 1 tablet by mouth daily      valsartan (DIOVAN) 80 MG tablet Take 1 tablet by mouth daily      insulin glargine (LANTUS) 100 UNIT/ML injection vial Inject 20 Units into the skin nightly (Patient taking differently: Inject 30 Units into the skin nightly) 1 each 0    apixaban (ELIQUIS) 2.5 MG TABS tablet Take 1 tablet by mouth 2 times daily (Patient not taking: Reported on 10/29/2024) 60 tablet 0     No current facility-administered

## 2025-01-14 ENCOUNTER — OFFICE VISIT (OUTPATIENT)
Age: 50
End: 2025-01-14

## 2025-01-14 VITALS — WEIGHT: 213 LBS | HEIGHT: 69 IN | BODY MASS INDEX: 31.55 KG/M2

## 2025-01-14 DIAGNOSIS — M66.241 SPONTANEOUS RUPTURE OF EXTENSOR TENDON OF RIGHT HAND: Primary | ICD-10-CM

## 2025-01-14 PROCEDURE — 99024 POSTOP FOLLOW-UP VISIT: CPT | Performed by: ORTHOPAEDIC SURGERY

## 2025-01-14 NOTE — PROGRESS NOTES
generated by voice recognition computer software. Although all attempts are made to edit the dictation for accuracy, there may be errors in the transcription that are not intended.    Electronically signed by Justino Quinn MD on 1/13/2025 at 10:17 PM

## 2025-01-20 DIAGNOSIS — M66.241 SPONTANEOUS RUPTURE OF EXTENSOR TENDON OF RIGHT HAND: Primary | ICD-10-CM

## 2025-01-20 RX ORDER — HYDROCODONE BITARTRATE AND ACETAMINOPHEN 5; 325 MG/1; MG/1
1 TABLET ORAL EVERY 8 HOURS PRN
Qty: 21 TABLET | Refills: 0 | Status: SHIPPED | OUTPATIENT
Start: 2025-01-20 | End: 2025-01-27
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
## 2025-02-25 ENCOUNTER — OFFICE VISIT (OUTPATIENT)
Age: 50
End: 2025-02-25
Payer: COMMERCIAL

## 2025-02-25 VITALS — HEIGHT: 69 IN | BODY MASS INDEX: 31.55 KG/M2 | WEIGHT: 213 LBS

## 2025-02-25 DIAGNOSIS — M66.241 SPONTANEOUS RUPTURE OF EXTENSOR TENDON OF RIGHT HAND: Primary | ICD-10-CM

## 2025-02-25 PROCEDURE — 99213 OFFICE O/P EST LOW 20 MIN: CPT | Performed by: ORTHOPAEDIC SURGERY

## 2025-05-02 ENCOUNTER — OFFICE VISIT (OUTPATIENT)
Dept: CARDIOLOGY | Facility: CLINIC | Age: 50
End: 2025-05-02
Payer: COMMERCIAL

## 2025-05-02 VITALS
HEIGHT: 70 IN | SYSTOLIC BLOOD PRESSURE: 112 MMHG | WEIGHT: 209.2 LBS | DIASTOLIC BLOOD PRESSURE: 64 MMHG | OXYGEN SATURATION: 98 % | BODY MASS INDEX: 29.95 KG/M2 | HEART RATE: 84 BPM

## 2025-05-02 DIAGNOSIS — I50.42 CHRONIC COMBINED SYSTOLIC AND DIASTOLIC HEART FAILURE: Primary | Chronic | ICD-10-CM

## 2025-05-02 DIAGNOSIS — I10 ESSENTIAL HYPERTENSION: ICD-10-CM

## 2025-05-02 DIAGNOSIS — E78.00 HYPERCHOLESTEROLEMIA: ICD-10-CM

## 2025-05-02 DIAGNOSIS — I42.0 CARDIOMYOPATHY, DILATED, NONISCHEMIC: ICD-10-CM

## 2025-05-02 RX ORDER — TADALAFIL 5 MG/1
1 TABLET ORAL DAILY
COMMUNITY

## 2025-05-02 NOTE — PROGRESS NOTES
[facility] - CARDIOLOGY    Primary Care Physician: Stacey Sullivan PA    Subjective     Chief Complaint: follow up cardiomyopathy     History of Present Illness    The patient presents to follow-up regarding his dilated nonischemic cardiomyopathy.  He established care with Dr. Felton in September 2024.  He previously followed in [place].  When he met Dr. Felton he was doing well and was without complaint, NYHA class I.  He has previously been intolerant to Entresto and Jardiance.  He had a cardiac MRI at [facility] in 2022.  See below.  He was diagnosed with diabetes in 2021, when he came in with an episode of DKA.  A few months later he had severe shortness of breath and was admitted at [facility] and was found to have cardiomyopathy.  He takes an ARB, Aldactone and Toprol-XL as well as as needed Lasix.  Since the initial incident he has not had any further exacerbations.  More recent echocardiogram in July 2024 revealed an LVEF of 51 to 55%.    At his visit with Dr. Felton given his risk factors and hyperlipidemia atorvastatin 20 mg was initiated.  Dr. Felton also noted depending on lab work in the future we may be able to increase his Aldactone to 50 mg daily, with hopes that he would require less of his as needed Lasix which he was taking approximately 5 days/week.    Today the patient presents for follow-up and states he continues to feel well.  He denies any changes since his last visit.  He is able to exercise at the gym without any shortness of breath, but occasionally he will get a little bit winded carrying 30 pound boxes.  He denies chest pain, orthopnea, PND, edema, palpitations, syncope or presyncope.  He does still take his Lasix 5 days/week, often preventatively but sometimes he takes it if his weight goes up a couple of pounds.  It appears he has lost 5 to 6 pounds since his last visit.    .      Review of Systems   Constitutional: Negative for malaise/fatigue.   Cardiovascular:  Negative for  chest pain, claudication, dyspnea on exertion, leg swelling, near-syncope, orthopnea, palpitations, paroxysmal nocturnal dyspnea and syncope.   Respiratory:  Negative for cough and shortness of breath.    Hematologic/Lymphatic: Does not bruise/bleed easily.   Musculoskeletal:  Negative for falls.   Gastrointestinal:  Negative for bloating.   Neurological:  Negative for dizziness, light-headedness and weakness.        Otherwise complete ROS reviewed and negative except as mentioned in the HPI.      Past Medical History:   Past Medical History:   Diagnosis Date    Abnormal ECG     Arrhythmia     Arthritis     Cardiomyopathy, dilated, nonischemic     [place] class 1    Diabetes mellitus     History of transfusion     Hypertension     Myopathy        Past Surgical History:  Past Surgical History:   Procedure Laterality Date    CYST REMOVAL      NECK    FINGER TENDON REPAIR Right 11/13/2024    Procedure: RIGHT MIDDLE FINGER EXTENSOR TENDON TRANSFER TO RIGHT MIDDLE EXTENSOR TENDON;  Surgeon: Benigno Garcia MD;  Location:  [facility] OR;  Service: Orthopedics;  Laterality: Right;    URETHRAL DILATION      WRIST ARTHROSCOPY Right 07/06/2022    Procedure: RIGHT WRIST ARTHROSCOPY limited debridement;  Surgeon: Benigno Garcia MD;  Location:  [facility] OR;  Service: Orthopedics;  Laterality: Right;       Family History: family history includes Heart disease in his father.    Social History:  reports that he has never smoked. He has never used smokeless tobacco. He reports that he does not drink alcohol and does not use drugs.    Medications:  Prior to Admission medications    Medication Sig Start Date End Date Taking? Authorizing Provider   aspirin 81 MG EC tablet Take 81 mg by mouth Daily.    Provider, MD Syed   azithromycin (Zithromax Z-Arsenio) 250 MG tablet Take 2 tablets by mouth on day 1, then 1 tablet daily on days 2-5 11/25/22   Nick Salguero APRN   Continuous Blood Gluc Transmit (Dexcom G6 Transmitter) misc     Provider,  "MD Syed   furosemide (LASIX) 40 MG tablet Take 40 mg by mouth Daily. 5/24/22   Syed Bergeron MD   Insulin Glargine (LANTUS SOLOSTAR) 100 UNIT/ML injection pen Inject 24 Units under the skin into the appropriate area as directed Daily. 5/24/22 5/25/23  Syed Bergeron MD   Insulin Lispro, 1 Unit Dial, (HUMALOG) 100 UNIT/ML solution pen-injector Inject 11-13 Units under the skin into the appropriate area as directed 3 (Three) Times a Day. 5/24/22   Syed Bergeron MD   methylPREDNISolone (Medrol) 2 MG tablet Take 1 tablet by mouth Daily. 10/11/23   Nick Salguero APRN   metoprolol succinate XL (TOPROL-XL) 25 MG 24 hr tablet Take 25 mg by mouth Daily. 7/5/22   Syed Bergeron MD   omeprazole (priLOSEC) 40 MG capsule Take 1 capsule by mouth Daily. 6/29/24   Nick Salguero APRN   spironolactone (ALDACTONE) 25 MG tablet Take 25 mg by mouth Daily. 3/1/22   Syed Bergeron MD   valsartan (DIOVAN) 80 MG tablet Take 80 mg by mouth Daily. 6/29/22 6/30/23  Syed Bergeron MD     Allergies:  Allergies   Allergen Reactions    Lorazepam Irritability    Entresto [Sacubitril-Valsartan] Cough    Jardiance [Empagliflozin] Cough       Objective     Vital Signs: /64   Pulse 84   Ht 177 cm (69.69\")   Wt 94.9 kg (209 lb 3.2 oz)   SpO2 98%   BMI 30.28 kg/m²     Vitals and nursing note reviewed.   Constitutional:       General: Not in acute distress.     Appearance: Well-developed and not in distress. Not diaphoretic.   Neck:      Vascular: No JVD or JVR. JVD normal.   Pulmonary:      Effort: Pulmonary effort is normal. No respiratory distress.      Breath sounds: Normal breath sounds.   Cardiovascular:      Normal rate. Regular rhythm.      Murmurs: There is no murmur.   Edema:     Peripheral edema absent.   Abdominal:      Tenderness: There is no abdominal tenderness.   Skin:     General: Skin is warm and dry.   Neurological:      Mental Status: Alert, oriented to " person, place, and time and oriented to person, place and time.       Physical Exam      Results Reviewed:  Results      Imaging  Cardiac MRI in 2022 showed heart function less than 35%. Ultrasound in February 2022 showed heart function 30-40%. Ultrasound in May 2022 showed heart function 40-50%.      ECG 12 Lead    Date/Time: 5/2/2025 10:23 AM  Performed by: Dianne Rodriguez APRN    Authorized by: Dianne Rodriguez APRN  Comparison: compared with previous ECG from 9/26/2024  Similar to previous ECG  Rhythm: sinus rhythm  BPM: 84  Conduction: 1st degree AV block    Clinical impression: abnormal EKG            Lab Results   Component Value Date    TRIG 442 (H) 05/31/2023    HDL 42 05/31/2023     Lab Results   Component Value Date    HGBA1C 7.8 02/20/2022       Results for orders placed during the hospital encounter of 07/30/24    Adult Transthoracic Echo Complete W/ Cont if Necessary Per Protocol    Interpretation Summary    Left ventricular systolic function is low normal. Left ventricular ejection fraction appears to be 51 - 55%.  There is very subtle global hypokinesis.    Estimated right ventricular systolic pressure from tricuspid regurgitation is normal (<35 mmHg).    Normal size and function the right ventricle.    No significant valvular pathology.    No previous studies performed here, therefore no direct comparisons can be made.    Relevant prior cardiac studies reviewed:        Assessment / Plan        Problem List Items Addressed This Visit          Cardiac and Vasculature    Chronic combined systolic and diastolic heart failure - Primary (Chronic)    Overview   Cardiac MRI at [place] 2022 showed proved nonischemic dilated cardiomyopathy, with low burden of late gadolinium enhancement (2-3%).  LVEF less than 35% at that time.         Relevant Medications    tadalafil (CIALIS) 5 MG tablet    Essential hypertension    Overview   Last Assessment & Plan:    Formatting of this note might be different from the  original.   --Management per cardiology   --Advised pt that healthy, sustainable lifestyle choices will help with all chronic conditions.   --Advise continued regular follow-up with cardiology   --Control all risk factors including BG, HTN, HLD prn         Hypercholesterolemia    Cardiomyopathy, dilated, nonischemic    Overview   [place] class 1         Relevant Medications    tadalafil (CIALIS) 5 MG tablet       Assessment & Plan  1. Nonischemic dilated cardiomyopathy (chronic HFrEF): The patient is doing well from a cardiovascular standpoint.  He is euvolemic on exam.  He is NYHA class I.    -Continue current doses of ARB, Aldactone, Toprol-XL  - Lasix 40 mg daily on an as-needed basis only-we discussed indications to take this including shortness of breath/orthopnea/PND or weight gain of greater than 2 pounds overnight or greater than 4 pounds in 2 days.  - Heart healthy low-sodium diet  - Daily weights  - Previously intolerant to Entresto and SGLT2 inhibitors    -He will continue his aspirin and moderate intensity statin as well.  Most recent labs in September reflect well-controlled LDL at 65.  Most recent A1c is 6.4.  His blood pressure remains very well-controlled.    Follow-up  Return in 6 months or sooner if new or worsening symptoms occur.    I spent 34 minutes caring for Santos on this date of service. This time includes time spent by me in the following activities: preparing for the visit, reviewing tests, performing a medically appropriate examination and/or evaluation, counseling and educating the patient/family/caregiver, and documenting information in the medical record

## 2025-07-01 ENCOUNTER — TELEPHONE (OUTPATIENT)
Dept: CARDIOLOGY | Facility: CLINIC | Age: 50
End: 2025-07-01
Payer: COMMERCIAL

## 2025-07-01 NOTE — TELEPHONE ENCOUNTER
The patient is being scheduled for a penile plaque repair under general anesthesia with Dr. Stan Washington.  They are requesting a cardiac risk assessment as well as holding recommendations for ASA 81 mg.  Thank you.

## (undated) DEVICE — COVER,MAYO STAND,STERILE: Brand: MEDLINE

## (undated) DEVICE — KT DRP MINIVIEW STRL

## (undated) DEVICE — TUBING, SUCTION, 1/4" X 12', STRAIGHT: Brand: MEDLINE

## (undated) DEVICE — BANDAGE,GAUZE,CONFORMING,1"X75",STRL,LF: Brand: MEDLINE

## (undated) DEVICE — PK EXTRM 30

## (undated) DEVICE — GLV SURG DERMASSURE GRN LF PF 8.0

## (undated) DEVICE — CVR BRD ARM 13X30

## (undated) DEVICE — BAPTIST TURNOVER KIT: Brand: MEDLINE INDUSTRIES, INC.

## (undated) DEVICE — PATIENT RETURN ELECTRODE, SINGLE-USE, CONTACT QUALITY MONITORING, ADULT, WITH 9FT CORD, FOR PATIENTS WEIGING OVER 33LBS. (15KG): Brand: MEGADYNE

## (undated) DEVICE — BNDG ELAS ECON W/CLIP 4IN 5YD LF STRL

## (undated) DEVICE — SUT ETHLN 4/0 FS2 18IN 662H

## (undated) DEVICE — MASK VENTILATOR MED AD SUPERNOVA ET

## (undated) DEVICE — DISPOSABLE TOURNIQUET CUFF SINGLE BLADDER, SINGLE PORT AND QUICK CONNECT CONNECTOR: Brand: COLOR CUFF

## (undated) DEVICE — DRESSING,GAUZE,XEROFORM,CURAD,5"X9",ST: Brand: CURAD

## (undated) DEVICE — GLV SURG SENSICARE W/ALOE PF LF 7.5 STRL

## (undated) DEVICE — NDL HYPO PRECISIONGLIDE REG 25G 1 1/2

## (undated) DEVICE — PK KN ARTHSCP 30

## (undated) DEVICE — 4-PORT MANIFOLD: Brand: NEPTUNE 2

## (undated) DEVICE — SURGICAL SUCTION CONNECTING TUBE WITH MALE CONNECTOR AND SUCTION CLAMP, 2 FT. LONG (.6 M), 5 MM I.D.: Brand: CONMED

## (undated) DEVICE — TBG ARTHRO FLOWSTEADY/ST DISP

## (undated) DEVICE — ENDO KIT,LOURDES HOSPITAL: Brand: MEDLINE INDUSTRIES, INC.

## (undated) DEVICE — SUCTION MAT (LOW PROFILE), 50X34: Brand: NEPTUNE

## (undated) DEVICE — SYR LL TP 10ML STRL

## (undated) DEVICE — SUT ETHLN 3/0 FS1 30IN 669H

## (undated) DEVICE — INTENDED FOR TISSUE SEPARATION, AND OTHER PROCEDURES THAT REQUIRE A SHARP SURGICAL BLADE TO PUNCTURE OR CUT.: Brand: BARD-PARKER ® STAINLESS STEEL BLADES

## (undated) DEVICE — BANDAGE,GAUZE,BULKEE II,4.5"X4.1YD,STRL: Brand: MEDLINE

## (undated) DEVICE — DRSNG GZ CURAD XEROFORM NONADHS 5X9IN STRL

## (undated) DEVICE — SKIN PREP TRAY W/CHG: Brand: MEDLINE INDUSTRIES, INC.

## (undated) DEVICE — BNDG ELAS ECON W/CLIP 3IN 5YD LF STRL